# Patient Record
Sex: MALE | Race: WHITE | Employment: OTHER | URBAN - METROPOLITAN AREA
[De-identification: names, ages, dates, MRNs, and addresses within clinical notes are randomized per-mention and may not be internally consistent; named-entity substitution may affect disease eponyms.]

---

## 2024-04-28 ENCOUNTER — HOSPITAL ENCOUNTER (INPATIENT)
Facility: HOSPITAL | Age: 79
LOS: 3 days | Discharge: HOME | DRG: 682 | End: 2024-05-01
Attending: EMERGENCY MEDICINE | Admitting: INTERNAL MEDICINE
Payer: MEDICARE

## 2024-04-28 ENCOUNTER — APPOINTMENT (OUTPATIENT)
Facility: HOSPITAL | Age: 79
DRG: 682 | End: 2024-04-28
Payer: MEDICARE

## 2024-04-28 DIAGNOSIS — N17.9 ACUTE KIDNEY INJURY (HCC): Primary | ICD-10-CM

## 2024-04-28 DIAGNOSIS — G93.40 ENCEPHALOPATHY: ICD-10-CM

## 2024-04-28 DIAGNOSIS — E86.0 DEHYDRATION: ICD-10-CM

## 2024-04-28 LAB
ALBUMIN SERPL-MCNC: 3.3 G/DL (ref 3.5–5)
ALBUMIN/GLOB SERPL: 0.9 (ref 1.1–2.2)
ALP SERPL-CCNC: 69 U/L (ref 45–117)
ALT SERPL-CCNC: 16 U/L (ref 12–78)
ANION GAP SERPL CALC-SCNC: 7 MMOL/L (ref 5–15)
AST SERPL-CCNC: 11 U/L (ref 15–37)
BASOPHILS # BLD: 0.1 K/UL (ref 0–0.1)
BASOPHILS NFR BLD: 0 % (ref 0–1)
BILIRUB SERPL-MCNC: 0.9 MG/DL (ref 0.2–1)
BUN SERPL-MCNC: 41 MG/DL (ref 6–20)
BUN/CREAT SERPL: 19 (ref 12–20)
CALCIUM SERPL-MCNC: 10.2 MG/DL (ref 8.5–10.1)
CHLORIDE SERPL-SCNC: 96 MMOL/L (ref 97–108)
CO2 SERPL-SCNC: 27 MMOL/L (ref 21–32)
CREAT SERPL-MCNC: 2.15 MG/DL (ref 0.7–1.3)
DIFFERENTIAL METHOD BLD: ABNORMAL
EOSINOPHIL # BLD: 0.1 K/UL (ref 0–0.4)
EOSINOPHIL NFR BLD: 0 % (ref 0–7)
ERYTHROCYTE [DISTWIDTH] IN BLOOD BY AUTOMATED COUNT: 13 % (ref 11.5–14.5)
GLOBULIN SER CALC-MCNC: 3.6 G/DL (ref 2–4)
GLUCOSE BLD STRIP.AUTO-MCNC: 126 MG/DL (ref 65–117)
GLUCOSE SERPL-MCNC: 134 MG/DL (ref 65–100)
HCT VFR BLD AUTO: 43.2 % (ref 36.6–50.3)
HGB BLD-MCNC: 14.9 G/DL (ref 12.1–17)
IMM GRANULOCYTES # BLD AUTO: 0.2 K/UL (ref 0–0.04)
IMM GRANULOCYTES NFR BLD AUTO: 1 % (ref 0–0.5)
LACTATE BLD-SCNC: 1.28 MMOL/L (ref 0.4–2)
LIPASE SERPL-CCNC: 40 U/L (ref 13–75)
LYMPHOCYTES # BLD: 2.1 K/UL (ref 0.8–3.5)
LYMPHOCYTES NFR BLD: 10 % (ref 12–49)
MCH RBC QN AUTO: 30.7 PG (ref 26–34)
MCHC RBC AUTO-ENTMCNC: 34.5 G/DL (ref 30–36.5)
MCV RBC AUTO: 88.9 FL (ref 80–99)
MONOCYTES # BLD: 1.8 K/UL (ref 0–1)
MONOCYTES NFR BLD: 9 % (ref 5–13)
NEUTS SEG # BLD: 16.7 K/UL (ref 1.8–8)
NEUTS SEG NFR BLD: 80 % (ref 32–75)
NRBC # BLD: 0 K/UL (ref 0–0.01)
NRBC BLD-RTO: 0 PER 100 WBC
PLATELET # BLD AUTO: 303 K/UL (ref 150–400)
PMV BLD AUTO: 10.4 FL (ref 8.9–12.9)
POTASSIUM SERPL-SCNC: 3.9 MMOL/L (ref 3.5–5.1)
PROT SERPL-MCNC: 6.9 G/DL (ref 6.4–8.2)
RBC # BLD AUTO: 4.86 M/UL (ref 4.1–5.7)
SERVICE CMNT-IMP: ABNORMAL
SODIUM SERPL-SCNC: 130 MMOL/L (ref 136–145)
TROPONIN I SERPL HS-MCNC: 20 NG/L (ref 0–76)
WBC # BLD AUTO: 20.9 K/UL (ref 4.1–11.1)

## 2024-04-28 PROCEDURE — 84484 ASSAY OF TROPONIN QUANT: CPT

## 2024-04-28 PROCEDURE — 83605 ASSAY OF LACTIC ACID: CPT

## 2024-04-28 PROCEDURE — 2580000003 HC RX 258: Performed by: EMERGENCY MEDICINE

## 2024-04-28 PROCEDURE — 6370000000 HC RX 637 (ALT 250 FOR IP): Performed by: INTERNAL MEDICINE

## 2024-04-28 PROCEDURE — 82962 GLUCOSE BLOOD TEST: CPT

## 2024-04-28 PROCEDURE — 1100000000 HC RM PRIVATE

## 2024-04-28 PROCEDURE — 6360000004 HC RX CONTRAST MEDICATION: Performed by: EMERGENCY MEDICINE

## 2024-04-28 PROCEDURE — 99285 EMERGENCY DEPT VISIT HI MDM: CPT

## 2024-04-28 PROCEDURE — 70450 CT HEAD/BRAIN W/O DYE: CPT

## 2024-04-28 PROCEDURE — 36415 COLL VENOUS BLD VENIPUNCTURE: CPT

## 2024-04-28 PROCEDURE — 87040 BLOOD CULTURE FOR BACTERIA: CPT

## 2024-04-28 PROCEDURE — 2580000003 HC RX 258: Performed by: INTERNAL MEDICINE

## 2024-04-28 PROCEDURE — C9113 INJ PANTOPRAZOLE SODIUM, VIA: HCPCS | Performed by: INTERNAL MEDICINE

## 2024-04-28 PROCEDURE — 6360000002 HC RX W HCPCS: Performed by: EMERGENCY MEDICINE

## 2024-04-28 PROCEDURE — 74177 CT ABD & PELVIS W/CONTRAST: CPT

## 2024-04-28 PROCEDURE — 85025 COMPLETE CBC W/AUTO DIFF WBC: CPT

## 2024-04-28 PROCEDURE — 6360000002 HC RX W HCPCS: Performed by: INTERNAL MEDICINE

## 2024-04-28 PROCEDURE — A4216 STERILE WATER/SALINE, 10 ML: HCPCS | Performed by: INTERNAL MEDICINE

## 2024-04-28 PROCEDURE — 6370000000 HC RX 637 (ALT 250 FOR IP): Performed by: NURSE PRACTITIONER

## 2024-04-28 PROCEDURE — 96374 THER/PROPH/DIAG INJ IV PUSH: CPT

## 2024-04-28 PROCEDURE — 80053 COMPREHEN METABOLIC PANEL: CPT

## 2024-04-28 PROCEDURE — 93005 ELECTROCARDIOGRAM TRACING: CPT | Performed by: EMERGENCY MEDICINE

## 2024-04-28 PROCEDURE — 71045 X-RAY EXAM CHEST 1 VIEW: CPT

## 2024-04-28 PROCEDURE — 83690 ASSAY OF LIPASE: CPT

## 2024-04-28 RX ORDER — SODIUM CHLORIDE, SODIUM LACTATE, POTASSIUM CHLORIDE, AND CALCIUM CHLORIDE .6; .31; .03; .02 G/100ML; G/100ML; G/100ML; G/100ML
1000 INJECTION, SOLUTION INTRAVENOUS
Status: COMPLETED | OUTPATIENT
Start: 2024-04-28 | End: 2024-04-28

## 2024-04-28 RX ORDER — SODIUM CHLORIDE 9 MG/ML
INJECTION, SOLUTION INTRAVENOUS PRN
Status: DISCONTINUED | OUTPATIENT
Start: 2024-04-28 | End: 2024-05-01 | Stop reason: HOSPADM

## 2024-04-28 RX ORDER — AMLODIPINE BESYLATE 5 MG/1
5 TABLET ORAL DAILY
Status: DISCONTINUED | OUTPATIENT
Start: 2024-04-29 | End: 2024-05-01 | Stop reason: HOSPADM

## 2024-04-28 RX ORDER — SODIUM CHLORIDE 0.9 % (FLUSH) 0.9 %
5-40 SYRINGE (ML) INJECTION EVERY 12 HOURS SCHEDULED
Status: DISCONTINUED | OUTPATIENT
Start: 2024-04-28 | End: 2024-05-01 | Stop reason: HOSPADM

## 2024-04-28 RX ORDER — SODIUM CHLORIDE 9 MG/ML
INJECTION, SOLUTION INTRAVENOUS CONTINUOUS
Status: ACTIVE | OUTPATIENT
Start: 2024-04-28 | End: 2024-04-30

## 2024-04-28 RX ORDER — TRAZODONE HYDROCHLORIDE 50 MG/1
50 TABLET ORAL NIGHTLY PRN
Status: DISCONTINUED | OUTPATIENT
Start: 2024-04-28 | End: 2024-04-28

## 2024-04-28 RX ORDER — ACETAMINOPHEN 650 MG/1
650 SUPPOSITORY RECTAL EVERY 6 HOURS PRN
Status: DISCONTINUED | OUTPATIENT
Start: 2024-04-28 | End: 2024-05-01 | Stop reason: HOSPADM

## 2024-04-28 RX ORDER — ACETAMINOPHEN 325 MG/1
650 TABLET ORAL EVERY 6 HOURS PRN
Status: DISCONTINUED | OUTPATIENT
Start: 2024-04-28 | End: 2024-05-01 | Stop reason: HOSPADM

## 2024-04-28 RX ORDER — TRAZODONE HYDROCHLORIDE 50 MG/1
50 TABLET ORAL ONCE
Status: COMPLETED | OUTPATIENT
Start: 2024-04-28 | End: 2024-04-28

## 2024-04-28 RX ORDER — SODIUM CHLORIDE 0.9 % (FLUSH) 0.9 %
5-40 SYRINGE (ML) INJECTION PRN
Status: DISCONTINUED | OUTPATIENT
Start: 2024-04-28 | End: 2024-05-01 | Stop reason: HOSPADM

## 2024-04-28 RX ORDER — ONDANSETRON 4 MG/1
4 TABLET, ORALLY DISINTEGRATING ORAL EVERY 8 HOURS PRN
Status: DISCONTINUED | OUTPATIENT
Start: 2024-04-28 | End: 2024-05-01 | Stop reason: HOSPADM

## 2024-04-28 RX ORDER — ONDANSETRON 2 MG/ML
4 INJECTION INTRAMUSCULAR; INTRAVENOUS EVERY 6 HOURS PRN
Status: DISCONTINUED | OUTPATIENT
Start: 2024-04-28 | End: 2024-05-01 | Stop reason: HOSPADM

## 2024-04-28 RX ORDER — TAMSULOSIN HYDROCHLORIDE 0.4 MG/1
0.4 CAPSULE ORAL DAILY
Status: DISCONTINUED | OUTPATIENT
Start: 2024-04-28 | End: 2024-05-01 | Stop reason: HOSPADM

## 2024-04-28 RX ORDER — POLYETHYLENE GLYCOL 3350 17 G/17G
17 POWDER, FOR SOLUTION ORAL DAILY PRN
Status: DISCONTINUED | OUTPATIENT
Start: 2024-04-28 | End: 2024-05-01 | Stop reason: HOSPADM

## 2024-04-28 RX ADMIN — TAMSULOSIN HYDROCHLORIDE 0.4 MG: 0.4 CAPSULE ORAL at 18:45

## 2024-04-28 RX ADMIN — SODIUM CHLORIDE, PRESERVATIVE FREE 5 ML: 5 INJECTION INTRAVENOUS at 21:17

## 2024-04-28 RX ADMIN — PANTOPRAZOLE SODIUM 40 MG: 40 INJECTION, POWDER, LYOPHILIZED, FOR SOLUTION INTRAVENOUS at 18:45

## 2024-04-28 RX ADMIN — IOPAMIDOL 100 ML: 755 INJECTION, SOLUTION INTRAVENOUS at 17:24

## 2024-04-28 RX ADMIN — SODIUM CHLORIDE, POTASSIUM CHLORIDE, SODIUM LACTATE AND CALCIUM CHLORIDE 1000 ML: 600; 310; 30; 20 INJECTION, SOLUTION INTRAVENOUS at 16:24

## 2024-04-28 RX ADMIN — TRAZODONE HYDROCHLORIDE 50 MG: 50 TABLET ORAL at 23:31

## 2024-04-28 RX ADMIN — SODIUM CHLORIDE: 9 INJECTION, SOLUTION INTRAVENOUS at 18:53

## 2024-04-28 RX ADMIN — SODIUM CHLORIDE 1000 MG: 900 INJECTION INTRAVENOUS at 16:11

## 2024-04-28 NOTE — ED PROVIDER NOTES
[facility] 1 CLINICAL OBS  EMERGENCY DEPARTMENT ENCOUNTER       Pt Name: Jonatan Rausch Jr.  MRN: [id number]  Birthdate 1945  Date of evaluation: 4/28/2024  Provider: Kenny Granda MD   PCP: Jeremias Mukherjee MD  Note Started: 4:13 PM EDT 4/28/24     CHIEF COMPLAINT       Chief Complaint   Patient presents with    Altered Mental Status     Pt reports to ed complaining of feeling like food is stuck in his chest, wife reports that he only had two bananas in the last two days, wife reports that he is intermittently confused and not sleeping well, pt is alert/oriented in triage but is slow to respond to questions        HISTORY OF PRESENT ILLNESS: 1 or more elements      History From: patient, History limited by: none     Jonatan Rausch Jr. is a 79 y.o. male with a history of hypertension, CAD and BPH presents emerged department with a chief complaint of \"altered mental status.\"    Patient reports symptoms began approximately 3 days ago with a feeling as though food was \"getting caught\" in his epigastrium.  1 episode of vomiting.  Anorexia decreased appetite.  Occasional cough.  Patient denies headache or neck pain.  Denies chest pain.  Denies shortness of breath.  Denies diarrhea.  No urinary symptoms although does have history of BPH.    Seen in urgent care and started on prednisone, recently completed this.  Patient's wife reports he is \"not himself\" and \"staring off in space.\"    Please See MDM for Additional Details of the HPI/PMH  Nursing Notes were all reviewed and agreed with or any disagreements were addressed in the HPI.     REVIEW OF SYSTEMS        Positives and Pertinent negatives as per HPI.    PAST HISTORY     Past Medical History:  No past medical history on file.    Past Surgical History:  No past surgical history on file.    Family History:  No family history on file.    Social History:       Allergies:  No Known Allergies    CURRENT MEDICATIONS      Current Discharge Medication List        CONTINUE these

## 2024-04-28 NOTE — H&P
age-appropriate. Sternotomy wires are intact. Bilateral glenohumeral osteoarthritis.     Left basilar subsegmental atelectasis.      _______________________________________________________________________    TOTAL TIME:  76 Minutes    Critical Care Provided     Minutes non procedure based    Signed: Connor Sauer MD    Procedures: see electronic medical records for all procedures/Xrays and details which were not copied into this note but were reviewed prior to creation of Plan.

## 2024-04-28 NOTE — ED NOTES
Bedside and Verbal shift change report given to Nina (oncoming nurse) by Kianna (offgoing nurse). Report included the following information ED SBAR, Adult Overview, MAR, Recent Results, and Neuro Assessment.

## 2024-04-29 LAB
AMMONIA PLAS-SCNC: 26 UMOL/L
ANION GAP SERPL CALC-SCNC: 6 MMOL/L (ref 5–15)
APPEARANCE UR: CLEAR
BACTERIA URNS QL MICRO: NEGATIVE /HPF
BASOPHILS # BLD: 0.1 K/UL (ref 0–0.1)
BASOPHILS NFR BLD: 0 % (ref 0–1)
BILIRUB UR QL: NEGATIVE
BUN SERPL-MCNC: 32 MG/DL (ref 6–20)
BUN/CREAT SERPL: 21 (ref 12–20)
CALCIUM SERPL-MCNC: 8.8 MG/DL (ref 8.5–10.1)
CHLORIDE SERPL-SCNC: 101 MMOL/L (ref 97–108)
CO2 SERPL-SCNC: 25 MMOL/L (ref 21–32)
COLOR UR: ABNORMAL
CREAT SERPL-MCNC: 1.52 MG/DL (ref 0.7–1.3)
DIFFERENTIAL METHOD BLD: ABNORMAL
EKG ATRIAL RATE: 86 BPM
EKG DIAGNOSIS: NORMAL
EKG P AXIS: 10 DEGREES
EKG P-R INTERVAL: 164 MS
EKG Q-T INTERVAL: 388 MS
EKG QRS DURATION: 104 MS
EKG QTC CALCULATION (BAZETT): 464 MS
EKG R AXIS: -25 DEGREES
EKG T AXIS: 117 DEGREES
EKG VENTRICULAR RATE: 86 BPM
EOSINOPHIL # BLD: 0.2 K/UL (ref 0–0.4)
EOSINOPHIL NFR BLD: 1 % (ref 0–7)
EPITH CASTS URNS QL MICRO: ABNORMAL /LPF
ERYTHROCYTE [DISTWIDTH] IN BLOOD BY AUTOMATED COUNT: 12.9 % (ref 11.5–14.5)
FOLATE SERPL-MCNC: 5.6 NG/ML (ref 5–21)
GLUCOSE SERPL-MCNC: 111 MG/DL (ref 65–100)
GLUCOSE UR STRIP.AUTO-MCNC: NEGATIVE MG/DL
HCT VFR BLD AUTO: 41.7 % (ref 36.6–50.3)
HGB BLD-MCNC: 14 G/DL (ref 12.1–17)
HGB UR QL STRIP: NEGATIVE
HYALINE CASTS URNS QL MICRO: ABNORMAL /LPF (ref 0–2)
IMM GRANULOCYTES # BLD AUTO: 0.1 K/UL (ref 0–0.04)
IMM GRANULOCYTES NFR BLD AUTO: 1 % (ref 0–0.5)
KETONES UR QL STRIP.AUTO: 15 MG/DL
LEUKOCYTE ESTERASE UR QL STRIP.AUTO: NEGATIVE
LYMPHOCYTES # BLD: 1.7 K/UL (ref 0.8–3.5)
LYMPHOCYTES NFR BLD: 10 % (ref 12–49)
MCH RBC QN AUTO: 30.7 PG (ref 26–34)
MCHC RBC AUTO-ENTMCNC: 33.6 G/DL (ref 30–36.5)
MCV RBC AUTO: 91.4 FL (ref 80–99)
MONOCYTES # BLD: 1.5 K/UL (ref 0–1)
MONOCYTES NFR BLD: 8 % (ref 5–13)
NEUTS SEG # BLD: 13.9 K/UL (ref 1.8–8)
NEUTS SEG NFR BLD: 80 % (ref 32–75)
NITRITE UR QL STRIP.AUTO: NEGATIVE
NRBC # BLD: 0 K/UL (ref 0–0.01)
NRBC BLD-RTO: 0 PER 100 WBC
PH UR STRIP: 6.5 (ref 5–8)
PLATELET # BLD AUTO: 216 K/UL (ref 150–400)
PMV BLD AUTO: 10.7 FL (ref 8.9–12.9)
POTASSIUM SERPL-SCNC: 3.8 MMOL/L (ref 3.5–5.1)
PROT UR STRIP-MCNC: NEGATIVE MG/DL
RBC # BLD AUTO: 4.56 M/UL (ref 4.1–5.7)
RBC #/AREA URNS HPF: ABNORMAL /HPF (ref 0–5)
SODIUM SERPL-SCNC: 132 MMOL/L (ref 136–145)
SP GR UR REFRACTOMETRY: 1.02
TSH SERPL DL<=0.05 MIU/L-ACNC: 0.65 UIU/ML (ref 0.36–3.74)
URINE CULTURE IF INDICATED: ABNORMAL
UROBILINOGEN UR QL STRIP.AUTO: 1 EU/DL (ref 0.2–1)
VIT B12 SERPL-MCNC: 656 PG/ML (ref 193–986)
WBC # BLD AUTO: 17.5 K/UL (ref 4.1–11.1)
WBC URNS QL MICRO: ABNORMAL /HPF (ref 0–4)

## 2024-04-29 PROCEDURE — 2580000003 HC RX 258: Performed by: INTERNAL MEDICINE

## 2024-04-29 PROCEDURE — 82140 ASSAY OF AMMONIA: CPT

## 2024-04-29 PROCEDURE — C9113 INJ PANTOPRAZOLE SODIUM, VIA: HCPCS | Performed by: INTERNAL MEDICINE

## 2024-04-29 PROCEDURE — 82746 ASSAY OF FOLIC ACID SERUM: CPT

## 2024-04-29 PROCEDURE — 84443 ASSAY THYROID STIM HORMONE: CPT

## 2024-04-29 PROCEDURE — 6370000000 HC RX 637 (ALT 250 FOR IP)

## 2024-04-29 PROCEDURE — 6370000000 HC RX 637 (ALT 250 FOR IP): Performed by: INTERNAL MEDICINE

## 2024-04-29 PROCEDURE — 36415 COLL VENOUS BLD VENIPUNCTURE: CPT

## 2024-04-29 PROCEDURE — A4216 STERILE WATER/SALINE, 10 ML: HCPCS | Performed by: INTERNAL MEDICINE

## 2024-04-29 PROCEDURE — 95816 EEG AWAKE AND DROWSY: CPT

## 2024-04-29 PROCEDURE — 81001 URINALYSIS AUTO W/SCOPE: CPT

## 2024-04-29 PROCEDURE — 80048 BASIC METABOLIC PNL TOTAL CA: CPT

## 2024-04-29 PROCEDURE — 82607 VITAMIN B-12: CPT

## 2024-04-29 PROCEDURE — 85025 COMPLETE CBC W/AUTO DIFF WBC: CPT

## 2024-04-29 PROCEDURE — 1100000000 HC RM PRIVATE

## 2024-04-29 PROCEDURE — 6360000002 HC RX W HCPCS: Performed by: INTERNAL MEDICINE

## 2024-04-29 RX ORDER — CARVEDILOL 25 MG/1
25 TABLET ORAL 2 TIMES DAILY
COMMUNITY

## 2024-04-29 RX ORDER — TRAZODONE HYDROCHLORIDE 100 MG/1
100 TABLET ORAL NIGHTLY
Status: ON HOLD | COMMUNITY
End: 2024-05-01 | Stop reason: HOSPADM

## 2024-04-29 RX ORDER — LISINOPRIL 20 MG/1
20 TABLET ORAL DAILY
Status: ON HOLD | COMMUNITY
End: 2024-04-29 | Stop reason: ALTCHOICE

## 2024-04-29 RX ORDER — AMLODIPINE BESYLATE 5 MG/1
5 TABLET ORAL
COMMUNITY

## 2024-04-29 RX ORDER — VALSARTAN 160 MG/1
160 TABLET ORAL DAILY
Status: ON HOLD | COMMUNITY
End: 2024-04-29 | Stop reason: ALTCHOICE

## 2024-04-29 RX ORDER — ACETAMINOPHEN/DIPHENHYDRAMINE 500MG-25MG
1-2 TABLET ORAL NIGHTLY PRN
COMMUNITY

## 2024-04-29 RX ORDER — MELOXICAM 7.5 MG/1
7.5 TABLET ORAL 2 TIMES DAILY
COMMUNITY

## 2024-04-29 RX ORDER — HYDROXYZINE HYDROCHLORIDE 10 MG/1
10 TABLET, FILM COATED ORAL 3 TIMES DAILY PRN
Status: DISCONTINUED | OUTPATIENT
Start: 2024-04-29 | End: 2024-05-01 | Stop reason: HOSPADM

## 2024-04-29 RX ORDER — HYDROCHLOROTHIAZIDE 25 MG/1
25 TABLET ORAL DAILY
Status: ON HOLD | COMMUNITY
End: 2024-04-29 | Stop reason: ALTCHOICE

## 2024-04-29 RX ORDER — OXYBUTYNIN CHLORIDE 5 MG/1
5 TABLET, EXTENDED RELEASE ORAL EVERY EVENING
COMMUNITY

## 2024-04-29 RX ORDER — FINASTERIDE 5 MG/1
5 TABLET, FILM COATED ORAL DAILY
COMMUNITY

## 2024-04-29 RX ORDER — QUETIAPINE FUMARATE 25 MG/1
25 TABLET, FILM COATED ORAL NIGHTLY
Status: DISCONTINUED | OUTPATIENT
Start: 2024-04-29 | End: 2024-05-01 | Stop reason: HOSPADM

## 2024-04-29 RX ORDER — SIMVASTATIN 20 MG
20 TABLET ORAL NIGHTLY
COMMUNITY

## 2024-04-29 RX ORDER — ASPIRIN 81 MG/1
81 TABLET ORAL
COMMUNITY

## 2024-04-29 RX ORDER — TRAZODONE HYDROCHLORIDE 50 MG/1
50-100 TABLET ORAL
Status: ON HOLD | COMMUNITY
End: 2024-04-29 | Stop reason: ALTCHOICE

## 2024-04-29 RX ORDER — CLONIDINE HYDROCHLORIDE 0.1 MG/1
0.1 TABLET ORAL 2 TIMES DAILY
Status: ON HOLD | COMMUNITY
End: 2024-04-29 | Stop reason: ALTCHOICE

## 2024-04-29 RX ADMIN — SODIUM CHLORIDE, PRESERVATIVE FREE 10 ML: 5 INJECTION INTRAVENOUS at 08:45

## 2024-04-29 RX ADMIN — SODIUM CHLORIDE: 9 INJECTION, SOLUTION INTRAVENOUS at 05:28

## 2024-04-29 RX ADMIN — HYDROXYZINE HYDROCHLORIDE 10 MG: 10 TABLET ORAL at 16:54

## 2024-04-29 RX ADMIN — TAMSULOSIN HYDROCHLORIDE 0.4 MG: 0.4 CAPSULE ORAL at 08:44

## 2024-04-29 RX ADMIN — AMLODIPINE BESYLATE 5 MG: 5 TABLET ORAL at 08:44

## 2024-04-29 RX ADMIN — PANTOPRAZOLE SODIUM 40 MG: 40 INJECTION, POWDER, LYOPHILIZED, FOR SOLUTION INTRAVENOUS at 08:45

## 2024-04-29 RX ADMIN — HYDROXYZINE HYDROCHLORIDE 10 MG: 10 TABLET ORAL at 20:43

## 2024-04-29 RX ADMIN — ACETAMINOPHEN 650 MG: 325 TABLET ORAL at 20:43

## 2024-04-29 RX ADMIN — QUETIAPINE FUMARATE 25 MG: 25 TABLET ORAL at 20:43

## 2024-04-29 NOTE — ED NOTES
TRANSFER - OUT REPORT:    Verbal report given to Martine on Jonatan Rausch Jr.  being transferred to [facility] for routine progression of patient care       Report consisted of patient's Situation, Background, Assessment and   Recommendations(SBAR).     Information from the following report(s) Nurse Handoff Report, Index, ED Encounter Summary, ED SBAR, MAR, and Cardiac Rhythm NSR  was reviewed with the receiving nurse.    [facility] Fall Assessment:    Presents to emergency department  because of falls (Syncope, seizure, or loss of consciousness): No  Age > 70: Yes  Altered Mental Status, Intoxication with alcohol or substance confusion (Disorientation, impaired judgment, poor safety awaremess, or inability to follow instructions): Yes  Impaired Mobility: Ambulates or transfers with assistive devices or assistance; Unable to ambulate or transer.: No  Nursing Judgement: Yes          Lines:   Peripheral IV 04/28/24 Right Forearm (Active)       Peripheral IV 04/28/24 Left Antecubital (Active)        Opportunity for questions and clarification was provided.      Patient transported with:  Tech

## 2024-04-29 NOTE — PROCEDURES
contains mixed frequencies in the delta, theta, alpha range. The posterior dominant rhythm reaches 8 to 9 hz and is seen symmetrically in both hemispheres.  Amplitudes are low and voltage.  However they are symmetric.  The anterior posterior gradient is well-organized.  There is variability, continuity, reactivity present.    Throughout the recording, there were no clear areas of focal slowing nor spike or spike-and-wave discharges seen.     Hyperventilation was not performed. Photic stimulation produced no response in the posterior head regions.     During drowsiness, there is attenuation of the posterior dominant rhythm, roving horizontal eye movements and slower frequencies in the theta range.     During the recording, the patient did not achieve stage II sleep.    There are no epileptiform discharges, electrographic seizures or clinical events of concern.     Single channel EKG shows regular rate and rhythm.    Clinical Interpretation:  This EEG, performed during wakefulness and drowsiness/sleep, is normal.  There was no clear focal abnormalities or epileptiform activity. The absence of epileptiform activity neither excludes nor supports the diagnosis of epilepsy. If further suspicion persists, would recommend obtaining a continuous EEG study. Clinical correlation recommended.     Brooklynn Calhoun D.O.

## 2024-04-30 ENCOUNTER — APPOINTMENT (OUTPATIENT)
Facility: HOSPITAL | Age: 79
DRG: 682 | End: 2024-04-30
Payer: MEDICARE

## 2024-04-30 LAB
ANION GAP SERPL CALC-SCNC: 6 MMOL/L (ref 5–15)
BASOPHILS # BLD: 0 K/UL (ref 0–0.1)
BASOPHILS NFR BLD: 0 % (ref 0–1)
BUN SERPL-MCNC: 21 MG/DL (ref 6–20)
BUN/CREAT SERPL: 18 (ref 12–20)
CALCIUM SERPL-MCNC: 8.9 MG/DL (ref 8.5–10.1)
CHLORIDE SERPL-SCNC: 105 MMOL/L (ref 97–108)
CO2 SERPL-SCNC: 24 MMOL/L (ref 21–32)
CREAT SERPL-MCNC: 1.14 MG/DL (ref 0.7–1.3)
DIFFERENTIAL METHOD BLD: ABNORMAL
EOSINOPHIL # BLD: 0.2 K/UL (ref 0–0.4)
EOSINOPHIL NFR BLD: 2 % (ref 0–7)
ERYTHROCYTE [DISTWIDTH] IN BLOOD BY AUTOMATED COUNT: 12.8 % (ref 11.5–14.5)
GLUCOSE SERPL-MCNC: 96 MG/DL (ref 65–100)
HCT VFR BLD AUTO: 40.9 % (ref 36.6–50.3)
HGB BLD-MCNC: 13.7 G/DL (ref 12.1–17)
IMM GRANULOCYTES # BLD AUTO: 0.1 K/UL (ref 0–0.04)
IMM GRANULOCYTES NFR BLD AUTO: 1 % (ref 0–0.5)
LYMPHOCYTES # BLD: 1 K/UL (ref 0.8–3.5)
LYMPHOCYTES NFR BLD: 10 % (ref 12–49)
MAGNESIUM SERPL-MCNC: 1.6 MG/DL (ref 1.6–2.4)
MCH RBC QN AUTO: 30.3 PG (ref 26–34)
MCHC RBC AUTO-ENTMCNC: 33.5 G/DL (ref 30–36.5)
MCV RBC AUTO: 90.5 FL (ref 80–99)
MONOCYTES # BLD: 0.9 K/UL (ref 0–1)
MONOCYTES NFR BLD: 9 % (ref 5–13)
NEUTS SEG # BLD: 8.2 K/UL (ref 1.8–8)
NEUTS SEG NFR BLD: 78 % (ref 32–75)
NRBC # BLD: 0 K/UL (ref 0–0.01)
NRBC BLD-RTO: 0 PER 100 WBC
PLATELET # BLD AUTO: 214 K/UL (ref 150–400)
PMV BLD AUTO: 10.5 FL (ref 8.9–12.9)
POTASSIUM SERPL-SCNC: 3.5 MMOL/L (ref 3.5–5.1)
PROCALCITONIN SERPL-MCNC: 0.05 NG/ML
RBC # BLD AUTO: 4.52 M/UL (ref 4.1–5.7)
SODIUM SERPL-SCNC: 135 MMOL/L (ref 136–145)
WBC # BLD AUTO: 10.5 K/UL (ref 4.1–11.1)

## 2024-04-30 PROCEDURE — 36415 COLL VENOUS BLD VENIPUNCTURE: CPT

## 2024-04-30 PROCEDURE — A4216 STERILE WATER/SALINE, 10 ML: HCPCS | Performed by: INTERNAL MEDICINE

## 2024-04-30 PROCEDURE — 6360000002 HC RX W HCPCS: Performed by: INTERNAL MEDICINE

## 2024-04-30 PROCEDURE — 6370000000 HC RX 637 (ALT 250 FOR IP): Performed by: INTERNAL MEDICINE

## 2024-04-30 PROCEDURE — 6370000000 HC RX 637 (ALT 250 FOR IP)

## 2024-04-30 PROCEDURE — 70551 MRI BRAIN STEM W/O DYE: CPT

## 2024-04-30 PROCEDURE — 97535 SELF CARE MNGMENT TRAINING: CPT

## 2024-04-30 PROCEDURE — 74220 X-RAY XM ESOPHAGUS 1CNTRST: CPT

## 2024-04-30 PROCEDURE — 85025 COMPLETE CBC W/AUTO DIFF WBC: CPT

## 2024-04-30 PROCEDURE — 84145 PROCALCITONIN (PCT): CPT

## 2024-04-30 PROCEDURE — 80048 BASIC METABOLIC PNL TOTAL CA: CPT

## 2024-04-30 PROCEDURE — C9113 INJ PANTOPRAZOLE SODIUM, VIA: HCPCS | Performed by: INTERNAL MEDICINE

## 2024-04-30 PROCEDURE — 83735 ASSAY OF MAGNESIUM: CPT

## 2024-04-30 PROCEDURE — 97165 OT EVAL LOW COMPLEX 30 MIN: CPT

## 2024-04-30 PROCEDURE — 97161 PT EVAL LOW COMPLEX 20 MIN: CPT | Performed by: PHYSICAL THERAPIST

## 2024-04-30 PROCEDURE — 2580000003 HC RX 258: Performed by: INTERNAL MEDICINE

## 2024-04-30 PROCEDURE — 1100000000 HC RM PRIVATE

## 2024-04-30 RX ADMIN — AMLODIPINE BESYLATE 5 MG: 5 TABLET ORAL at 08:35

## 2024-04-30 RX ADMIN — SODIUM CHLORIDE, PRESERVATIVE FREE 5 ML: 5 INJECTION INTRAVENOUS at 20:32

## 2024-04-30 RX ADMIN — PANTOPRAZOLE SODIUM 40 MG: 40 INJECTION, POWDER, LYOPHILIZED, FOR SOLUTION INTRAVENOUS at 08:35

## 2024-04-30 RX ADMIN — ACETAMINOPHEN 650 MG: 325 TABLET ORAL at 20:32

## 2024-04-30 RX ADMIN — QUETIAPINE FUMARATE 25 MG: 25 TABLET ORAL at 20:32

## 2024-04-30 RX ADMIN — HYDROXYZINE HYDROCHLORIDE 10 MG: 10 TABLET ORAL at 20:32

## 2024-04-30 RX ADMIN — TAMSULOSIN HYDROCHLORIDE 0.4 MG: 0.4 CAPSULE ORAL at 08:35

## 2024-04-30 RX ADMIN — SODIUM CHLORIDE, PRESERVATIVE FREE 10 ML: 5 INJECTION INTRAVENOUS at 08:34

## 2024-04-30 NOTE — CONSULTS
Date of Consultation:  April 30, 2024    Referring Physician: MD True     Reason for Consultation:  confusion, staring spells last 3-4 days     Chief Complaint   Patient presents with    Altered Mental Status     Pt reports to ed complaining of feeling like food is stuck in his chest, wife reports that he only had two bananas in the last two days, wife reports that he is intermittently confused and not sleeping well, pt is alert/oriented in triage but is slow to respond to questions       History of Present Illness:   Jonatan Rausch Jr. is a 79 y.o. male with hypertension, hyperlipidemia, history of coronary artery disease status post CABG on Plavix who presents with PABLO due to dehydration, dysphagia, leukocytosis secondary to prednisone use who presents with acute metabolic encephalopathy with confusion and episodes of staring in space.      Patient initially presented with complaint of food getting stuck in his upper abdomen.  This has been going on for about a week.  He was noted to have a ringworm infection and was placed on prednisone.  Wife reports that he is having episodes of confusion and staring episodes where he stares into space for the last 3 to 4 days.  Denies any shaking episode.  She states that she is able to call to have the neck and break his stare.  No drooling. Patient's wife also mentions short term memory issues, forgetful about the date. Asking same questions again.  This has been going on for a year.  He is also gotten lost while driving, still continues to drive.  He is taking his medications on his own, however his wife is unsure if he is taking them appropriately.  He is able to dress himself, feed himself, bathe himself.  He can do laundry.  He does have difficulty recently with finances, paying bills repetitively.    Patient denies any history of seizures or family.      No past medical history on file.     No past surgical history on file.     No family history on file.     Social 
04/29/24  5:30 AM   Result Value Ref Range    Sodium 132 (L) 136 - 145 mmol/L    Potassium 3.8 3.5 - 5.1 mmol/L    Chloride 101 97 - 108 mmol/L    CO2 25 21 - 32 mmol/L    Anion Gap 6 5 - 15 mmol/L    Glucose 111 (H) 65 - 100 mg/dL    BUN 32 (H) 6 - 20 MG/DL    Creatinine 1.52 (H) 0.70 - 1.30 MG/DL    Bun/Cre Ratio 21 (H) 12 - 20      Est, Glom Filt Rate 46 (L) >60 ml/min/1.73m2    Calcium 8.8 8.5 - 10.1 MG/DL   CBC with Auto Differential    Collection Time: 04/29/24  5:30 AM   Result Value Ref Range    WBC 17.5 (H) 4.1 - 11.1 K/uL    RBC 4.56 4.10 - 5.70 M/uL    Hemoglobin 14.0 12.1 - 17.0 g/dL    Hematocrit 41.7 36.6 - 50.3 %    MCV 91.4 80.0 - 99.0 FL    MCH 30.7 26.0 - 34.0 PG    MCHC 33.6 30.0 - 36.5 g/dL    RDW 12.9 11.5 - 14.5 %    Platelets 216 150 - 400 K/uL    MPV 10.7 8.9 - 12.9 FL    Nucleated RBCs 0.0 0  WBC    nRBC 0.00 0.00 - 0.01 K/uL    Neutrophils % 80 (H) 32 - 75 %    Lymphocytes % 10 (L) 12 - 49 %    Monocytes % 8 5 - 13 %    Eosinophils % 1 0 - 7 %    Basophils % 0 0 - 1 %    Immature Granulocytes % 1 (H) 0.0 - 0.5 %    Neutrophils Absolute 13.9 (H) 1.8 - 8.0 K/UL    Lymphocytes Absolute 1.7 0.8 - 3.5 K/UL    Monocytes Absolute 1.5 (H) 0.0 - 1.0 K/UL    Eosinophils Absolute 0.2 0.0 - 0.4 K/UL    Basophils Absolute 0.1 0.0 - 0.1 K/UL    Immature Granulocytes Absolute 0.1 (H) 0.00 - 0.04 K/UL    Differential Type AUTOMATED     Vitamin B12    Collection Time: 04/29/24  5:30 AM   Result Value Ref Range    Vitamin B-12 656 193 - 986 pg/mL   Ammonia    Collection Time: 04/29/24  5:30 AM   Result Value Ref Range    Ammonia 26 <32 UMOL/L   Folate    Collection Time: 04/29/24  5:30 AM   Result Value Ref Range    Folate 5.6 5.0 - 21.0 ng/mL   TSH    Collection Time: 04/29/24  5:30 AM   Result Value Ref Range    TSH, 3RD GENERATION 0.65 0.36 - 3.74 uIU/mL       IMAGING RESULTS:  I have personally reviewed the imaging reports      Total time spent with patient: 15 minutes

## 2024-04-30 NOTE — CARE COORDINATION
Care Management Initial Assessment       RUR: 9%  Readmission? No  1st IM letter given? Yes - 4/28/24  1st  letter given: No      CM introduce self, explain role and confirmed demographics with pt.    Pt lives with his spouse in an on story home with 5 steps to enter.    No hx of home health, SNF or inpatient rehab.    Pt uses [facility] at [place].    At the time of d/c pt's spouse will transport.    CM will follow and assist with d/c planning.       04/30/24 6469   Service Assessment   Patient Orientation Alert and Oriented   Cognition Alert   History Provided By Patient   Primary Caregiver Self   Support Systems Spouse/Significant Other   Patient's Healthcare Decision Maker is: Legal Next of Kin  (Pt's spouse Avril Rausch)   Prior Functional Level Independent in ADLs/IADLs   Current Functional Level Independent in ADLs/IADLs   Can patient return to prior living arrangement Yes   Family able to assist with home care needs: Yes   Would you like for me to discuss the discharge plan with any other family members/significant others, and if so, who? Yes  (Pt's spouse-Avril Rausch)   Financial Resources Medicare;Other (Comment)   Community Resources None   Social/Functional History   Lives With Spouse   Type of Home House   Home Equipment Cane;Grab bars  (C Pap)   Active  Yes   Discharge Planning   Type of Residence House   Current Services Prior To Admission C-pap   Patient expects to be discharged to: House     Advance Care Planning     General Advance Care Planning (ACP) Conversation    Date of Conversation: 4/30/2024  Conducted with: Patient with Decision Making Capacity  Other persons present: None    Healthcare Decision Maker: No healthcare decision makers have been documented.  Click here to complete HealthCare Decision Makers including selection of the Healthcare Decision Maker Relationship (ie \"Primary\")       Content/Action Overview:  DECLINED ACP Conversation - will revisit

## 2024-05-01 ENCOUNTER — TELEPHONE (OUTPATIENT)
Facility: HOSPITAL | Age: 79
End: 2024-05-01

## 2024-05-01 VITALS
OXYGEN SATURATION: 96 % | TEMPERATURE: 98.4 F | SYSTOLIC BLOOD PRESSURE: 117 MMHG | HEART RATE: 87 BPM | RESPIRATION RATE: 16 BRPM | DIASTOLIC BLOOD PRESSURE: 93 MMHG

## 2024-05-01 LAB
ANION GAP SERPL CALC-SCNC: 5 MMOL/L (ref 5–15)
BASOPHILS # BLD: 0.1 K/UL (ref 0–0.1)
BASOPHILS NFR BLD: 1 % (ref 0–1)
BUN SERPL-MCNC: 16 MG/DL (ref 6–20)
BUN/CREAT SERPL: 13 (ref 12–20)
CALCIUM SERPL-MCNC: 8.3 MG/DL (ref 8.5–10.1)
CHLORIDE SERPL-SCNC: 107 MMOL/L (ref 97–108)
CO2 SERPL-SCNC: 23 MMOL/L (ref 21–32)
CREAT SERPL-MCNC: 1.19 MG/DL (ref 0.7–1.3)
DIFFERENTIAL METHOD BLD: ABNORMAL
EOSINOPHIL # BLD: 0.2 K/UL (ref 0–0.4)
EOSINOPHIL NFR BLD: 2 % (ref 0–7)
ERYTHROCYTE [DISTWIDTH] IN BLOOD BY AUTOMATED COUNT: 12.6 % (ref 11.5–14.5)
GLUCOSE SERPL-MCNC: 115 MG/DL (ref 65–100)
HCT VFR BLD AUTO: 44.2 % (ref 36.6–50.3)
HGB BLD-MCNC: 14.7 G/DL (ref 12.1–17)
IMM GRANULOCYTES # BLD AUTO: 0.1 K/UL (ref 0–0.04)
IMM GRANULOCYTES NFR BLD AUTO: 1 % (ref 0–0.5)
LYMPHOCYTES # BLD: 1 K/UL (ref 0.8–3.5)
LYMPHOCYTES NFR BLD: 11 % (ref 12–49)
MCH RBC QN AUTO: 30.4 PG (ref 26–34)
MCHC RBC AUTO-ENTMCNC: 33.3 G/DL (ref 30–36.5)
MCV RBC AUTO: 91.5 FL (ref 80–99)
MONOCYTES # BLD: 1 K/UL (ref 0–1)
MONOCYTES NFR BLD: 10 % (ref 5–13)
NEUTS SEG # BLD: 7.4 K/UL (ref 1.8–8)
NEUTS SEG NFR BLD: 75 % (ref 32–75)
NRBC # BLD: 0 K/UL (ref 0–0.01)
NRBC BLD-RTO: 0 PER 100 WBC
PLATELET # BLD AUTO: 226 K/UL (ref 150–400)
PMV BLD AUTO: 10.5 FL (ref 8.9–12.9)
POTASSIUM SERPL-SCNC: 3.7 MMOL/L (ref 3.5–5.1)
RBC # BLD AUTO: 4.83 M/UL (ref 4.1–5.7)
SODIUM SERPL-SCNC: 135 MMOL/L (ref 136–145)
WBC # BLD AUTO: 9.6 K/UL (ref 4.1–11.1)

## 2024-05-01 PROCEDURE — 97535 SELF CARE MNGMENT TRAINING: CPT

## 2024-05-01 PROCEDURE — 6370000000 HC RX 637 (ALT 250 FOR IP): Performed by: INTERNAL MEDICINE

## 2024-05-01 PROCEDURE — 99233 SBSQ HOSP IP/OBS HIGH 50: CPT

## 2024-05-01 PROCEDURE — 97116 GAIT TRAINING THERAPY: CPT

## 2024-05-01 PROCEDURE — 2580000003 HC RX 258: Performed by: INTERNAL MEDICINE

## 2024-05-01 PROCEDURE — A4216 STERILE WATER/SALINE, 10 ML: HCPCS | Performed by: INTERNAL MEDICINE

## 2024-05-01 PROCEDURE — 6360000002 HC RX W HCPCS: Performed by: INTERNAL MEDICINE

## 2024-05-01 PROCEDURE — 36415 COLL VENOUS BLD VENIPUNCTURE: CPT

## 2024-05-01 PROCEDURE — 80048 BASIC METABOLIC PNL TOTAL CA: CPT

## 2024-05-01 PROCEDURE — 85025 COMPLETE CBC W/AUTO DIFF WBC: CPT

## 2024-05-01 PROCEDURE — C9113 INJ PANTOPRAZOLE SODIUM, VIA: HCPCS | Performed by: INTERNAL MEDICINE

## 2024-05-01 RX ORDER — QUETIAPINE FUMARATE 25 MG/1
25 TABLET, FILM COATED ORAL NIGHTLY
Qty: 30 TABLET | Refills: 3 | Status: SHIPPED | OUTPATIENT
Start: 2024-05-01

## 2024-05-01 RX ORDER — TAMSULOSIN HYDROCHLORIDE 0.4 MG/1
0.4 CAPSULE ORAL DAILY
Qty: 30 CAPSULE | Refills: 3 | Status: SHIPPED | OUTPATIENT
Start: 2024-05-02

## 2024-05-01 RX ORDER — DONEPEZIL HYDROCHLORIDE 5 MG/1
5 TABLET, FILM COATED ORAL NIGHTLY
Qty: 30 TABLET | Refills: 3 | Status: SHIPPED | OUTPATIENT
Start: 2024-05-01

## 2024-05-01 RX ADMIN — SODIUM CHLORIDE, PRESERVATIVE FREE 10 ML: 5 INJECTION INTRAVENOUS at 11:21

## 2024-05-01 RX ADMIN — TAMSULOSIN HYDROCHLORIDE 0.4 MG: 0.4 CAPSULE ORAL at 11:18

## 2024-05-01 RX ADMIN — SODIUM CHLORIDE, PRESERVATIVE FREE 10 ML: 5 INJECTION INTRAVENOUS at 11:20

## 2024-05-01 RX ADMIN — PANTOPRAZOLE SODIUM 40 MG: 40 INJECTION, POWDER, LYOPHILIZED, FOR SOLUTION INTRAVENOUS at 11:19

## 2024-05-01 RX ADMIN — AMLODIPINE BESYLATE 5 MG: 5 TABLET ORAL at 11:19

## 2024-05-01 ASSESSMENT — PAIN SCALES - GENERAL: PAINLEVEL_OUTOF10: 0

## 2024-05-01 NOTE — CARE COORDINATION
Pt is clear from CM standpoint for d/c.    Pt's spouse will transport.    Transition of Care Plan:    RUR: 10%  Prior Level of Functioning: Independent   Disposition: Home with spouse  no services   If SNF or IPR: Date FOC offered:   Date FOC received:   Accepting facility:   Date authorization started with reference number:   Date authorization received and expires:   Follow up appointments: PCP  DME needed: No DME reported   Transportation at discharge: Pt's spouse  IM/IMM Medicare/ letter given: 5/1/24  Is patient a [facility] and connected with VA? No   If yes, was  transfer form completed and VA notified? No  Caregiver Contact: Pt's spouse   Discharge Caregiver contacted prior to discharge? Pt was contacted   Care Conference needed? No  Barriers to discharge: None         05/01/24 1341   Services At/After Discharge   Transition of Care Consult (CM Consult) N/A   Services At/After Discharge None    Resource Information Provided? No   Mode of Transport at Discharge Self   Confirm Follow Up Transport Self   Condition of Participation: Discharge Planning   The Plan for Transition of Care is related to the following treatment goals: Goal is to return home with spouse and follow up appointments   The Patient and/or Patient Representative was provided with a Choice of Provider? Patient   The Patient and/Or Patient Representative agree with the Discharge Plan? Yes   Freedom of Choice list was provided with basic dialogue that supports the patient's individualized plan of care/goals, treatment preferences, and shares the quality data associated with the providers?  Yes     Caro Carmona

## 2024-05-01 NOTE — DISCHARGE SUMMARY
week(s)  Post-hospitalization follow up visit    Brooklynn Calhoun DO  [address]  [address]  [facility] [id number] [phone]    Follow up in 2 week(s)  Formal neurocognitive testing in office  Patient advised not to drive for personal or professional purposes until seen by neurology and cleared (or not cleared) per their judgement    Marco Leung MD  [address]  [address]  [facility] [address] [phone]    Follow up in 2 week(s)  May see for further evaluation of dysphagia (difficulty swallowing)  Can consider EGD or esophageal dilation on outpatient basis          Total time in minutes spent coordinating this discharge (includes going over instructions, follow-up, prescriptions, and preparing report for sign off to her PCP) :  35 minutes

## 2024-05-01 NOTE — PLAN OF CARE
Problem: Discharge Planning  Goal: Discharge to home or other facility with appropriate resources  Outcome: Progressing     
Problem: Physical Therapy - Adult  Goal: By Discharge: Performs mobility at highest level of function for planned discharge setting.  See evaluation for individualized goals.  Description: FUNCTIONAL STATUS PRIOR TO ADMISSION: Patient was independent and active without use of DME.    HOME SUPPORT PRIOR TO ADMISSION: The patient lived with wife but did not require assist. (Per patient)    Physical Therapy Goals  Initiated 4/30/2024  1.  Patient will move from supine to sit and sit to supine  in bed with independence within 7 day(s).    2.  Patient will transfer from bed to chair and chair to bed with independence using the least restrictive device within 7 day(s).  3.  Patient will perform sit to stand with independence within 7 day(s).  4.  Patient will ambulate with supervision/set-up for 200 feet with the least restrictive device within 7 day(s).   5.  Patient will ascend/descend 3 stairs with 1 handrail(s) with minimal assistance/contact guard assist within 7 day(s).    Outcome: Adequate for Discharge     PHYSICAL THERAPY TREATMENT/DISCHARGE    Patient: Jonatan Rausch  (79 y.o. male)  Date: 5/1/2024  Diagnosis: PABLO (acute kidney injury) ([facility]) [N17.9] PABLO (acute kidney injury) ([facility])      Precautions: Fall Risk, General Precautions, Bed Alarm (telesitter)                    ASSESSMENT:  Patient has been followed by skilled PT services and has progressed towards goals. Patient ambulating in lemus with overall supervision to independence. Gait is slow and shuffling but appears close to baseline per patient report and no overt loss of balance. Patient unaware of reason for admission and is eager to discharge home from hospital. He is cooperative and follows all commands and directions appropriately. He demonstrates no Acute PT needs and no PT needs recommended at discharge. Will sign off.      PLAN:  Maximum therapeutic benefit has been met at current level of care and patient will be discharged from physical therapy 
(telesitter; NPO)                Chart, occupational therapy assessment, plan of care, and goals were reviewed.    ASSESSMENT  Patient continues to benefit from skilled OT services and is progressing towards goals. Good participate; S-functional mobility today, S use of buttong hook with good  handle; verbally trained use of zipper pull. Wife present and aware of need for button assist.             PLAN :  Patient continues to benefit from skilled intervention to address the above impairments.  Continue treatment per established plan of care to address goals.    Recommend with staff: up to chair for all meals    Recommend next OT session: [facility]    Recommendation for discharge: (in order for the patient to meet his/her long term goals): Therapy 2x a week in the home    Other factors to consider for discharge: impaired cognition and not safe to be alone    IF patient discharges home will need the following DME: transfer bench       SUBJECTIVE:   Patient stated “I will use this; it works good.” (button hook)    OBJECTIVE DATA SUMMARY:   Cognitive/Behavioral Status:  Orientation  Overall Orientation Status: Impaired  Orientation Level: Oriented to place;Oriented to time;Oriented to person;Disoriented to situation  Cognition  Overall Cognitive Status: Exceptions  Arousal/Alertness: Appropriate responses to stimuli  Following Commands: Follows one step commands with increased time;Follows one step commands with repetition;Follows multistep commands with repitition;Follows multistep commands with increased time  Attention Span: Difficulty dividing attention  Memory: Decreased short term memory;Decreased recall of biographical Information;Decreased recall of precautions;Decreased recall of recent events  Safety Judgement: Decreased awareness of need for safety;Decreased awareness of need for assistance  Problem Solving: Decreased awareness of errors (thinks he is still working which wife reports he is not)  Insights: 
Impaired  Orientation Level: Oriented to place;Oriented to person;Disoriented to time  Cognition  Overall Cognitive Status: Exceptions  Arousal/Alertness: Appropriate responses to stimuli  Following Commands: Follows one step commands consistently  Attention Span: Appears intact  Memory: Decreased short term memory  Safety Judgement: Decreased awareness of need for assistance;Decreased awareness of need for safety  Initiation: Does not require cues  Sequencing: Does not require cues    Skin: bruising noted UE's    Edema: none noted    Hearing:   Hearing  Hearing: Within functional limits    Vision/Perceptual:          Vision  Vision: Within Functional Limits       Strength:    Strength: Generally decreased, functional    Tone & Sensation:   Tone: Normal  Sensation: Intact    Coordination:  Coordination: Within functional limits    Range Of Motion:  AROM: Within functional limits  PROM: Within functional limits    Functional Mobility:  Bed Mobility:        Transfers:     Transfer Training  Transfer Training: Yes  Sit to Stand: Stand-by assistance  Stand to Sit: Stand-by assistance  Balance:               Balance  Sitting: Intact  Standing: Impaired  Standing - Static: Constant support;Good  Standing - Dynamic: Fair  Ambulation/Gait Training:                  Distance: 100'     Gait  Gait Training: Yes  Left Side Weight Bearing: Full  Right Side Weight Bearing: Full  Assistive Device:  (HHA)  Speed/Sujey: Pace decreased (< 100 feet/min)  Gait Abnormalities: Trunk sway increased;Decreased step clearance                                                                                                                                                                                                                                                          [facility] AM-PAC®      Basic Mobility Inpatient Short Form (6-Clicks) Version 2  How much HELP from another person do you currently need... (If the patient hasn't 
Decreased awareness of deficits  Initiation: Requires cues for some  Sequencing: Requires cues for some  Cognition Comment: recommend cog screen    Skin: intact    Edema: + B ankles    Hearing:   Hearing  Hearing: Within functional limits    Vision/Perceptual:          Vision  Vision: Within Functional Limits (reading glasses)  Perception  Overall Perceptual Status:  (Draw A Clock test i,paired; see paper chart)    Range of Motion:   AROM: Generally decreased, functional         Strength:  Strength: Generally decreased, functional      Coordination:  Coordination: Generally decreased, functional     Coordination: Within functional limits      Tone & Sensation:   Tone: Normal  Sensation: Impaired (L hand fingertips numb)          Functional Mobility and Transfers for ADLs:    Bed Mobility:     Bed Mobility Training  Bed Mobility Training: Yes  Rolling: Stand-by assistance;Supervision  Supine to Sit: Stand-by assistance;Supervision;Adaptive equipment;Additional time  Sit to Supine: Stand-by assistance;Supervision;Additional time;Adaptive equipment  Scooting: Supervision;Additional time;Adaptive equipment;Stand-by assistance    Transfers:      Transfer Training  Transfer Training: Yes  Overall Level of Assistance: Supervision;Contact-guard assistance  Sit to Stand: Supervision;Contact-guard assistance  Stand to Sit: Supervision;Contact-guard assistance                     Balance:   Standing: Impaired  Balance  Sitting: Intact  Standing: Impaired  Standing - Static: Constant support;Good  Standing - Dynamic: Constant support;Fair      ADL Assessment:          Feeding: Setup;Stand by assistance  Feeding Skilled Clinical Factors: inferred; needs set up due to numbness L fingertips; \"coordination is harder\" Currently NPO; expect after set up he will do well    Grooming: Setup;Stand by assistance       UE Bathing: Supervision            LE Bathing: Supervision;Contact guard assistance       UE Dressing: Minimal

## 2024-05-01 NOTE — PROGRESS NOTES
Hospitalist Progress Note    NAME:   Jonatan Rausch Jr.   : 1945   MRN: [id number]     Date/Time: 2024 6:58 PM  Patient PCP: Jeremias Mukherjee MD    Estimated discharge date: ?  Barriers: GI, neuro, clinical stability      Assessment / Plan:  Acute kidney injury likely prerenal due to dehydration  -Baseline creatinine is normal at 1 back in   Creatinine 2.15 on admit.  Continue IV fluids with normal saline  Monitor urine output  Hold home valsartan due to acute kidney injury  Cr improving to 1.52 today s/p IV fluids     Dysphagia  -Patient is reporting a sensation of food getting stuck in the upper abdomen.  He has not eaten well in the last 3 to 4 days.  -Cont clear liquid diet, PPI  -CT abdomen no acute bowel pathology, cholelithiasis without gallbladder inflammation  -GI consulted, recommended barium swallow, however radiology notified nursing this should be an outpatient procedure  -Nurse notified GI.   Next step EGD if no barium swallow possible?  Will resume CLD and keep NPO p MN for possible EGD     Leukocytosis  -Most likely due to his prednisone use.  He is not tachycardic.  UA (-)  Blood culture NGTD.  Check procalcitonin level.  He got a dose of Rocephin empirically in the emergency department and will hold off on further antibiotics.     Acute metabolic encephalopathy  Staring in space  -Wife reported that he is getting confused lately and had episodes of staring in space  Has been receiving course of prednisone for shoulder pain x 5 days  -Will check vitamin B12, vitamin D, TSH with reflex  Ammonia normal  -EEG (-)  - Could be related to prednisone but has not improved off prednisone  - Increasing confusion, particularly at night, suspect underlying cognitive decline +/- hospital delirium, aggravated by steroids, dehydration   Neurology consult appreciated     Hypertension  BPH  Dyslipidemia  History of coronary artery s/p CABG  GERD  -Continue PTA Norvasc.  Holding 
      Hospitalist Progress Note    NAME:   Jonatan Rausch Jr.   : 1945   MRN: [id number]     Date/Time: 2024 3:08 PM  Patient PCP: Jeremias Mukherjee MD    Estimated discharge date: +  Barriers: neuro clearance, MRI, clinical stability, tolerating diet      Assessment / Plan:  Dysphagia  -Patient is reporting a sensation of food getting stuck in the upper abdomen.  He has not eaten well in the last 3 to 4 days.  -Cont clear liquid diet, PPI  -CT abdomen no acute bowel pathology, cholelithiasis without gallbladder inflammation  -GI consult appreciated  - Barium swallow (-)  - GI with no plans for further inpatient endoscopic eval; could consider esophageal dilation on outpatient basis if dysphagia persists  - GI signed off   -Trial regular diet today, if tolerates suspect pt could DC home soon    Acute metabolic encephalopathy  Staring in space  Sundowning behaviors; concern for cognitive decline and dementia  -Wife reported that he is getting confused lately and had episodes of staring in space  Has been receiving course of prednisone for ringworm x 5 days PTA  Normal vitamin B12, vitamin D, TSH  Ammonia normal  -EEG (-)  - Could be related to prednisone but has not improved off prednisone  - Increasing confusion, particularly at night, suspect underlying cognitive decline +/- hospital delirium, aggravated by steroids, dehydration   Neurology consult appreciated  Plan for MRI brain, eventual outpt formal neuropsych testing  Tele sitter at bedside for safety     Leukocytosis--resolved, WBC 10 today  -Most likely due to his prednisone use.  He is not tachycardic.  UA (-)  Blood culture NGTD.  He got a dose of Rocephin empirically in the emergency department and will hold off on further antibiotics.    Acute kidney injury likely prerenal due to dehydration--resolved  -Baseline creatinine is normal at 1 back in   Creatinine 2.15 on admit.  Continue IV fluids with normal saline  Monitor urine 
  GI PROGRESS NOTE  Mitchel Dowell PA-C  [phone] NP in-hospital cell phone M-F until 4:30  After 5pm or on weekends, please call  for physician on call    NAME:Jonatan Rausch Jr. :1945 MRN:[id number]   ATTG: MD Tiffanie   PCP: Jeremias Mukherjee MD  Date/Time:  2024 9:24 AM   Reason for following: Dysphagia    Assessment:   Dysphagia x1 week  PABLO  Acute Metabolic encephalography  VSS  Cr 1.52  Ammonia 26  LFTs nl  WBC 20.9 POA., HGB nl  CT abd/pel  IMPRESSION:  Cholelithiasis without gallbladder inflammation.  No acute bowel pathology.  Mild constipation.  BS 24 - There is no evidence of esophageal stricture or significant esophageal  dysmotility.    Scope Hx  EGD  - G1 reflux esophagitis and small Hiatal hernia. Gastritis. Nl duodenum.   Plan:   Barium swallow without any evidence of esophageal stricture or dysmotility.   Okay for regular diet today.   No further inpatient endoscopic evaluation. If symptoms persist could consider empiric dilation on outpatient basis.   PPI BID given hx of reflux and not on PPI outpatient. Can continue QD oral outpatient.   Rest of care per primary team.   Nothing further to add from a GI standpoint.  GI signing-off.  Please call with any questions.  Thank you for this consult.     Plan discussed with Dr. Leung  I reviewed the medical record including lab work, imaging and other provider notes. I confirmed the interval history as described above.. I discussed this case in detail with DORCAS Bazan. I formulated an updated  assessment of this patient and guided our treatment plan.  I agree with the above progress note. I agree with the history, exam and assessment and plan as outlined in the note.  I would like to add the followinyo M w/ reported dysphagia that did not impede his ingestions here.  Barium swallow completed this AM at our behest demonstrated no esophageal stricture or dysmotility.   Plan: 1) Advance diet; 2) No EGD indicated in this pt; 3) 
EEG completed  
MRI PENDING    Completion of MRI Screening Sheet    Fax to [phone] when completed    Please call [phone]  When this is done    Thank You  
Nursing contacted Nocturnist/cross cover provider and notified patient requesting a sleep aid, states he takes 50-100mg trazodone at home prn and also tylenol PM 1-2 tabs. No other concerns reported. No acute distress reported. VSS. Ordered trazodone 50mg once, as appears earlier on admit had some documented AMS, will not do the higher dose and will not add tylenol pm tylenol/benadryl to trazodone overnight, would defer to dayshift for further sleep med adjustment to meds per their recs. Will defer further evaluation/management to the day shift primary attending care team. Patient denies any further complaints or concerns. Nursing to notify Hospitalist for further/continued concerns. Will remain available overnight for further concerns if nursing/patient needs.     Non-billable note.       
Occupational Therapy   Chart reviewed; cleared for tx; currently leaving floor for test; will retry later for OT eval. Shanae Gamble OTR/L  
Occupational Therapy   Chart reviewed; eval completed; recommend up to chair 3x/day even though not currently eating; Full note to follow; patient recommended to have HH with 24/7 S, if family able to provide this level of assist. If S 24/7 not available then recommend SNF. Shanae Gamble OTR/L  
Pharmacy Medication Reconciliation     The patient was interviewed regarding current PTA medication list, use and drug allergies. The patient was questioned regarding use of any other inhalers, topical products, over the counter medications, herbal medications, vitamin products or ophthalmic/nasal/otic medication use.     Allergy Update: Patient has no known allergies.    Recommendations/Findings:   The following amendments were made to the patient's active medication list on file at [facility]:   1) Additions: NA    2) Deletions: clonidine, hydrochlorothiazide/lisinopril    3) Changes: trazodone 100 mg QHS, Tylenol PM QHS      Pertinent Findings: NA    Clarified PTA med list with patient, insurance fill history. PTA medication list was corrected to the following:     Prior to Admission Medications   Prescriptions Last Dose Informant   Apoaequorin (PREVAGEN PO) 4/28/2024 Self   Sig: Take 1 tablet by mouth daily   amLODIPine (NORVASC) 5 MG tablet 4/28/2024 Other   Sig: Take 1 tablet by mouth nightly   aspirin 81 MG EC tablet 4/28/2024 Self   Sig: Take 1 tablet by mouth nightly   carvedilol (COREG) 25 MG tablet 4/28/2024 Other   Sig: Take 1 tablet by mouth 2 times daily   diphenhydrAMINE-APAP, sleep, (TYLENOL PM EXTRA STRENGTH)  MG tablet 4/28/2024 Self   Sig: Take 1-2 tablets by mouth nightly as needed for Sleep   finasteride (PROSCAR) 5 MG tablet 4/28/2024 Other   Sig: Take 1 tablet by mouth daily   meloxicam (MOBIC) 7.5 MG tablet 4/28/2024 Other   Sig: Take 1 tablet by mouth in the morning and at bedtime   oxyBUTYnin (DITROPAN-XL) 5 MG extended release tablet 4/28/2024 Other   Sig: Take 1 tablet by mouth every evening   simvastatin (ZOCOR) 20 MG tablet 4/28/2024 Other   Sig: Take 1 tablet by mouth nightly   traZODone (DESYREL) 100 MG tablet Past Month Other   Sig: Take 1 tablet by mouth nightly      Facility-Administered Medications: None      Thank you,  Efren Staton, [facility]   
Physical Therapy  PT consult received; chart reviewed.  Upon arrival to room, patient ambulating to stretcher to go off floor for test.  Will attempt evaluation again later this morning.  
Pt reports after lunch which was soft diet he still felt like things were getting stuck in his esophagus (pointing to his upper chest area).  GI informed and said he can schedule something outpatient for a scope and that sometimes reflux can make you have that symptom.  
 0 - 2 /lpf Final    Procalcitonin 04/30/2024 0.05  ng/mL Final    Comment:    Suspected Sepsis:  <0.50 ng/mL     Low likelihood of sepsis.  0.50-2.00 ng/mL    Increased likelihood of sepsis. Antibiotics encouraged.  >2.00 ng/mL  High risk of sepsis/shock. Antibiotics strongly encouraged.     Suspected Lower Resp Tract Infections:  <0.24 ng/mL    Low likelihood of bacterial infection.  >0.24 ng/mL    Increased likelihood of bacterial infection. Antibiotics encouraged.     With successful antibiotic therapy, PCT levels should decrease rapidly. (Half-life of 24 to 36 hours)     Procalcitonin values from samples collected within the first 6 hours of systemic infection may still be low. Retesting may be indicated.  Values from day 1 and day 4 can be entered into the Change in Procalcitonin Calculator (www.[employer] [facility]antagepct-calculator.[employer]) to determine the patient's Mortality Risk Prognosis.     In healthy neonates, plasma Procalcitonin (PCT) concentrations increase gradually after birth, reaching peak values at about 24 hours of age then decrease to normal values below 0                           .5 ng/mL by 48-72 hours of age.      WBC 04/30/2024 10.5  4.1 - 11.1 K/uL Final    RBC 04/30/2024 4.52  4.10 - 5.70 M/uL Final    Hemoglobin 04/30/2024 13.7  12.1 - 17.0 g/dL Final    Hematocrit 04/30/2024 40.9  36.6 - 50.3 % Final    MCV 04/30/2024 90.5  80.0 - 99.0 FL Final    MCH 04/30/2024 30.3  26.0 - 34.0 PG Final    MCHC 04/30/2024 33.5  30.0 - 36.5 g/dL Final    RDW 04/30/2024 12.8  11.5 - 14.5 % Final    Platelets 04/30/2024 214  150 - 400 K/uL Final    MPV 04/30/2024 10.5  8.9 - 12.9 FL Final    Nucleated RBCs 04/30/2024 0.0  0  WBC Final    nRBC 04/30/2024 0.00  0.00 - 0.01 K/uL Final    Neutrophils % 04/30/2024 78 (H)  32 - 75 % Final    Lymphocytes % 04/30/2024 10 (L)  12 - 49 % Final    Monocytes % 04/30/2024 9  5 - 13 % Final    Eosinophils % 04/30/2024 2  0 - 7 % Final    Basophils % 04/30/2024 0  0 - 1 % Final

## 2024-05-01 NOTE — TELEPHONE ENCOUNTER
Hello,    Can this patient be scheduled for hospital follow-up in 4 to 8 weeks with any other neurology providers?    Thanks,  Yoel

## 2024-05-02 ENCOUNTER — TELEPHONE (OUTPATIENT)
Age: 79
End: 2024-05-02

## 2024-05-02 NOTE — TELEPHONE ENCOUNTER
Patient wife, Avril, stated patient saw Yoel Dipak in hospital on 5/1 and she recommended a neuropsych test for alzheimer's dementia. Please give them a call back at . Thank you.

## 2024-05-04 LAB
BACTERIA SPEC CULT: NORMAL
BACTERIA SPEC CULT: NORMAL
SERVICE CMNT-IMP: NORMAL
SERVICE CMNT-IMP: NORMAL

## 2024-06-04 ENCOUNTER — OFFICE VISIT (OUTPATIENT)
Age: 79
End: 2024-06-04
Payer: MEDICARE

## 2024-06-04 VITALS
WEIGHT: 194 LBS | OXYGEN SATURATION: 98 % | HEART RATE: 78 BPM | RESPIRATION RATE: 16 BRPM | DIASTOLIC BLOOD PRESSURE: 72 MMHG | SYSTOLIC BLOOD PRESSURE: 128 MMHG | HEIGHT: 66 IN | BODY MASS INDEX: 31.18 KG/M2

## 2024-06-04 DIAGNOSIS — Z91.89 STROKE RISK: ICD-10-CM

## 2024-06-04 DIAGNOSIS — R41.3 MEMORY LOSS: Primary | ICD-10-CM

## 2024-06-04 DIAGNOSIS — Z09 HOSPITAL DISCHARGE FOLLOW-UP: ICD-10-CM

## 2024-06-04 PROCEDURE — 3078F DIAST BP <80 MM HG: CPT

## 2024-06-04 PROCEDURE — G8427 DOCREV CUR MEDS BY ELIG CLIN: HCPCS

## 2024-06-04 PROCEDURE — 99215 OFFICE O/P EST HI 40 MIN: CPT

## 2024-06-04 PROCEDURE — G8417 CALC BMI ABV UP PARAM F/U: HCPCS

## 2024-06-04 PROCEDURE — 3074F SYST BP LT 130 MM HG: CPT

## 2024-06-04 PROCEDURE — 1036F TOBACCO NON-USER: CPT

## 2024-06-04 PROCEDURE — 1123F ACP DISCUSS/DSCN MKR DOCD: CPT

## 2024-06-04 RX ORDER — NITROGLYCERIN 0.4 MG/1
0.4 TABLET SUBLINGUAL EVERY 5 MIN PRN
COMMUNITY

## 2024-06-04 RX ORDER — DONEPEZIL HYDROCHLORIDE 5 MG/1
10 TABLET, FILM COATED ORAL NIGHTLY
Qty: 180 TABLET | Refills: 3 | Status: SHIPPED | OUTPATIENT
Start: 2024-06-04

## 2024-06-04 ASSESSMENT — PATIENT HEALTH QUESTIONNAIRE - PHQ9
SUM OF ALL RESPONSES TO PHQ QUESTIONS 1-9: 0
1. LITTLE INTEREST OR PLEASURE IN DOING THINGS: NOT AT ALL
SUM OF ALL RESPONSES TO PHQ QUESTIONS 1-9: 0
SUM OF ALL RESPONSES TO PHQ9 QUESTIONS 1 & 2: 0
2. FEELING DOWN, DEPRESSED OR HOPELESS: NOT AT ALL
SUM OF ALL RESPONSES TO PHQ QUESTIONS 1-9: 0
SUM OF ALL RESPONSES TO PHQ QUESTIONS 1-9: 0

## 2024-06-04 NOTE — PATIENT INSTRUCTIONS
As per our discussion,    Overall, you seem stable without any worsening in your symptoms.    I encourage you to continue to take your medication as prescribed and continue to follow-up with your primary care provider and your other specialists for other comorbid conditions as appropriate.    As we discussed treatment options in detail including risks/benefits and expectations for memory loss.  While medication is not likely to made a drastic difference, it may aid modestly in improving concentration and attention.  I will send you additional refills for the donepezil.  In 90 days, increase to 2 tablets at bedtime.    - Medication titration and addition of adjunctive agents may be necessary to achieve maximum benefit.      - Discussed that he would benefit from designation of a POA to assist with executive decision making.  Finances and medication administration should be monitored to ensure accuracy and prevent errors.     I encouraged you to engage in approximately 2.5 hours of physician approved physical activity on a weekly basis.    Maintain a healthy diet. they will also be informed of the Mediterranean diet, which has been associated with reduced risk for neurodegenerative conditions as well as heart disease.    Maintain a cognitively stimulated lifestyle, also incorporating social interaction.    As far as driving, will refer you to [facility] for driving simulation.  Someone will call you for scheduling.    As for the memory, will try to move up your neuropsych testing sooner than December 2024 to see whether you can drive.  Please DO NOT drive until instructed by neuropsych evaluation.    It is always a pleasure to see you and meet your wife Avril    Will see you back in 6 months or sooner if needed    Please do not hesitate to reach out for any questions or concerns

## 2024-06-04 NOTE — PROGRESS NOTES
1. Have you been to the ER, urgent care clinic since your last visit?  Hospitalized since your last visit?  Seen on 4/28/24    2. Have you seen or consulted any other health care providers outside of the [facility] since your last visit?  Include any pap smears or colon screening.   No.        Chief Complaint   Patient presents with    Memory Loss     Hospital follow up- short term memory issues- bill paying issues and when driving gets confused       
spelling errors.  Other human spelling and other errors may be present.  Corrections may be executed at a later time.  Please feel free to contact us for any clarifications as needed.         KELLY Brooks NP

## 2024-06-05 PROBLEM — Z09 HOSPITAL DISCHARGE FOLLOW-UP: Status: ACTIVE | Noted: 2024-06-05

## 2024-06-05 PROBLEM — Z91.89 STROKE RISK: Status: ACTIVE | Noted: 2024-06-05

## 2024-06-05 PROBLEM — R41.3 MEMORY LOSS: Status: ACTIVE | Noted: 2024-06-05

## 2024-06-05 ASSESSMENT — ENCOUNTER SYMPTOMS
EYES NEGATIVE: 1
ALLERGIC/IMMUNOLOGIC NEGATIVE: 1
GASTROINTESTINAL NEGATIVE: 1
RESPIRATORY NEGATIVE: 1

## 2024-06-05 NOTE — ASSESSMENT & PLAN NOTE
Patient denied any staring episodes and confusion and would like to know if he can go back to work.  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    His current neurological examination reveals mild cognitive impairment.    He has been taking donepezil 5 mg at bedtime and denied any side effects.  We will not make any changes on donepezil at this time and patient is to increase donepezil to 10 mg after being on it for 90 days.  Additional refills were sent to his pharmacy.    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    For his safety and the safety of others, we will refer him to [facility] for driving evaluation to test his reaction time.   He also had an appointment scheduled with Dr. Theodore for neuropsych evaluation in December 2024.  However, given the urgency of patient wanting to drive, I have personally reached out to Dr. Theodore's nurse to see if they can give him a sooner appointment and place him in the cancellation list.    Those 2 tests will help us determine whether patient is deemed safe to drive.  He verbalized understanding and agreed with plan of care.    Will not repeat any imaging given they were recently completed.  His brain MRI on 4/30/2024 showed no acute intracranial abnormality. Generalized parenchymal volume loss with disproportionate atrophy of the bilateral hippocampi and mesial temporal lobes, suggestive of Alzheimer's dementia. Moderate chronic microvascular ischemic disease.    Routine EEG on 4/29/2024, was normal    TSH, folate, ammonia, vitamin B12 were all unremarkable.    Patient denied any anxiety/depression however, he is on Zoloft 50 mg daily.  He is to continue Zoloft as prescribed.    As we discussed treatment options in detail including

## 2024-06-05 NOTE — ASSESSMENT & PLAN NOTE
Patient denied any strokelike symptoms.    History of close factors include hypertension, dyslipidemia    Patient is to continue on aspirin 81 mg and simvastatin 20 mg daily.    Patient is to continue to work to all of his comorbid conditions as managed by PCP and other specialists as appropriate.    Education was provided today in regards to risk factors for stroke and lifestyle modifications to help minimize the risk of future stroke.    This included medication compliance, regular follow up with primary care physician,  and healthy lifestyle habits (nutrition/exercise).

## 2024-06-06 ENCOUNTER — CLINICAL DOCUMENTATION (OUTPATIENT)
Age: 79
End: 2024-06-06

## 2024-06-10 ENCOUNTER — TELEPHONE (OUTPATIENT)
Age: 79
End: 2024-06-10

## 2024-06-10 DIAGNOSIS — R41.3 MEMORY LOSS: Primary | ICD-10-CM

## 2024-06-10 RX ORDER — DONEPEZIL HYDROCHLORIDE 10 MG/1
10 TABLET, FILM COATED ORAL NIGHTLY
Qty: 30 TABLET | Refills: 5 | Status: SHIPPED | OUTPATIENT
Start: 2024-06-10

## 2024-06-10 NOTE — TELEPHONE ENCOUNTER
Message  Received: Today  Cecilia Ayala APRN - Maryse Bernabe, LPN  Caller: Unspecified (Today, 11:22 AM)  I sent in a rx for 10mg, but please advise pt to follow the instructions that Yoel had given him (staying at the lower does for 90 days, 5mg, he can break the 10mg in half).  EN        Called pt - no answer and voice mail box is full, could not leave a message.

## 2024-06-10 NOTE — TELEPHONE ENCOUNTER
Received fax from [employer] stating Donepezil 5 mg- the insurance will only pay for 1 tablet at a time.  Can we change this to 10 mg tab nightly? Please advise, thanks.  Last appt 6/4/24 with Yoel.

## 2024-06-11 NOTE — TELEPHONE ENCOUNTER
Tried to call for 2nd time. No answer and voice mail box is full.       Called Avril wife on PHI-advised I tried to reach out to pt but no luck. Relayed message below to her regarding medication change due to insurance. She advised Yoel discussed with them at the last office visit to go up in mg on the donepezil in July. I advised if that is what was discussed then that is ok. Do as she advised at the last office visit.  Verified the new script was sent to the pharmacy yesterday. Avril verbalized understanding and thanked me for the call.

## 2024-06-14 ENCOUNTER — TELEPHONE (OUTPATIENT)
Age: 79
End: 2024-06-14

## 2024-06-14 NOTE — TELEPHONE ENCOUNTER
Patient spouse, Avril states Freednorman FeldmanOtis told them to call the office and speak with Ana to get a sooner appt with Dr. Theodore. Please call them back at . Thank you.

## 2024-06-14 NOTE — TELEPHONE ENCOUNTER
Chris/[facility] states that pt is scheduled for 6/17 at [facility] for OT. They would like to add for pt to have the driving stimulation. Please fax order to [phone] office [phone]

## 2024-06-17 NOTE — TELEPHONE ENCOUNTER
Returned call and left message letting them know the appointment is on the wait list and we will call if we get any opens.  Please call back with any questions.

## 2024-06-19 ENCOUNTER — OFFICE VISIT (OUTPATIENT)
Age: 79
End: 2024-06-19
Payer: MEDICARE

## 2024-06-19 DIAGNOSIS — G30.1 MILD LATE ONSET ALZHEIMER'S DEMENTIA WITH MOOD DISTURBANCE (HCC): Primary | ICD-10-CM

## 2024-06-19 DIAGNOSIS — F02.A3 MILD LATE ONSET ALZHEIMER'S DEMENTIA WITH MOOD DISTURBANCE (HCC): Primary | ICD-10-CM

## 2024-06-19 PROCEDURE — 1036F TOBACCO NON-USER: CPT | Performed by: CLINICAL NEUROPSYCHOLOGIST

## 2024-06-19 PROCEDURE — 90791 PSYCH DIAGNOSTIC EVALUATION: CPT | Performed by: CLINICAL NEUROPSYCHOLOGIST

## 2024-06-19 PROCEDURE — 1123F ACP DISCUSS/DSCN MKR DOCD: CPT | Performed by: CLINICAL NEUROPSYCHOLOGIST

## 2024-06-19 PROCEDURE — 90785 PSYTX COMPLEX INTERACTIVE: CPT | Performed by: CLINICAL NEUROPSYCHOLOGIST

## 2024-06-19 NOTE — PROGRESS NOTES
will be helpful to refine differential diagnosis and treatment planning.  The patient is currently scheduled for evaluation on 6/24/2024 and report will be attached to that encounter.    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    PLAN:  Patient will participate in comprehensive evaluation in order to obtain a quantitative assessment of their cognitive and emotional functioning  Differential diagnosis and treatment planning will be based upon results from clinical interview and objective testing  Patient will be provided with review of results, impressions, and recommendations during feedback session  Patient will be encouraged to follow-up with referring provider for ongoing management    TIME DOCUMENTATION:  Clinical Interview: 4582 - 3683 = 25 minutes    Visit was complicated by Patient expressed desire to have others present during visit. Provider spent 10 minutes providing interactive complexity services due to need to obtain or communicate information to those involved in patient's care. The session included the use of the following adaptations in order to overcome the difficulties. Others were included in order to obtain collateral history for individual with diminished insight and episodic memory loss .      BILLING:  90791x1  90785x1

## 2024-06-24 ENCOUNTER — PROCEDURE VISIT (OUTPATIENT)
Age: 79
End: 2024-06-24
Payer: MEDICARE

## 2024-06-24 DIAGNOSIS — F02.80 MIXED ALZHEIMER'S AND VASCULAR DEMENTIA (HCC): Primary | ICD-10-CM

## 2024-06-24 DIAGNOSIS — F01.50 MIXED ALZHEIMER'S AND VASCULAR DEMENTIA (HCC): Primary | ICD-10-CM

## 2024-06-24 DIAGNOSIS — G30.9 MIXED ALZHEIMER'S AND VASCULAR DEMENTIA (HCC): Primary | ICD-10-CM

## 2024-06-24 PROCEDURE — 96133 NRPSYC TST EVAL PHYS/QHP EA: CPT | Performed by: CLINICAL NEUROPSYCHOLOGIST

## 2024-06-24 PROCEDURE — 96139 PSYCL/NRPSYC TST TECH EA: CPT | Performed by: CLINICAL NEUROPSYCHOLOGIST

## 2024-06-24 PROCEDURE — 96132 NRPSYC TST EVAL PHYS/QHP 1ST: CPT | Performed by: CLINICAL NEUROPSYCHOLOGIST

## 2024-06-24 PROCEDURE — 96138 PSYCL/NRPSYC TECH 1ST: CPT | Performed by: CLINICAL NEUROPSYCHOLOGIST

## 2024-07-03 ENCOUNTER — CLINICAL DOCUMENTATION (OUTPATIENT)
Age: 79
End: 2024-07-03

## 2024-07-03 NOTE — PROGRESS NOTES
Received OT Driving Evaluation from [facility]. Yoel Quesada NP reviewed and signed. Faxed to [phone] and received fax confirmation. Placed in fast scan.

## 2024-07-05 PROBLEM — Z09 HOSPITAL DISCHARGE FOLLOW-UP: Status: RESOLVED | Noted: 2024-06-05 | Resolved: 2024-07-05

## 2024-07-23 ENCOUNTER — CLINICAL DOCUMENTATION (OUTPATIENT)
Age: 79
End: 2024-07-23

## 2024-07-23 PROBLEM — F02.80 MIXED ALZHEIMER'S AND VASCULAR DEMENTIA (HCC): Status: ACTIVE | Noted: 2024-07-23

## 2024-07-23 PROBLEM — G30.9 MIXED ALZHEIMER'S AND VASCULAR DEMENTIA (HCC): Status: ACTIVE | Noted: 2024-07-23

## 2024-07-23 PROBLEM — F01.50 MIXED ALZHEIMER'S AND VASCULAR DEMENTIA (HCC): Status: ACTIVE | Noted: 2024-07-23

## 2024-07-23 NOTE — PROGRESS NOTES
Received [facility] OT notes on driving evaluation. Yoel reviewed and signed. Placed in fast scan.

## 2024-07-23 NOTE — PROGRESS NOTES
Results:  For standardized tests, performance was compared to a demographically matched sample of neurocognitively healthy adults using the following classification system to indicate deviation from mean (or average) test performance:    Normally Distributed Not-Normally Distributed   Descriptor Percentile Descriptor Percentile   \"Exceptionally High\" >97th \"Within Normal Limits\" >24th   \"Above Average\" 91st - 97th \"Low Average\" 9th - 24th   \"High Average\" 75th - 90th \"Below Average\" 2nd - 8th   \"Average\" 25th - 74th \"Exceptionally Low\" <2nd   \"Low Average\" 9th - 24th    \"Below Average\" 2nd - 8th    \"Exceptionally Low\" <2nd      Performance and symptom validity are analyzed in a number of ways, including administration of neuropsychological measures that have been empirically shown to identify suboptimal performance or purposeful exaggeration. These tests and their scores have been redacted from this report in order to ensure test security, but are available to formally trained neuropsychologists upon request. In addition, when possible, the overall pattern of performance is analyzed for consistency between measures, consistency with the expected severity of impairment, and the presenting symptoms are compared against base rates of symptoms in other patients with similar problems. The patient's performances were within acceptable limits, indicating the results of the present evaluation are believed to be valid and reliable.    Premorbid Functioning:   Low average  Attention:   Brief auditory attention: Average  Auditory working memory: Below average to average  Processing Speed:  Average  Executive Functioning:   Mental set switching: Discontinued due to time limits.  During the allotted time (5 minutes), the patient successfully completed 10 out of 24 transitions.  He made 3 sequencing errors  Problem Solving: Low average  Inhibition: Discontinued during practice due to excessive errors  Inhibition/Switching:

## 2024-07-24 ENCOUNTER — OFFICE VISIT (OUTPATIENT)
Age: 79
End: 2024-07-24
Payer: MEDICARE

## 2024-07-24 DIAGNOSIS — F02.80 MIXED ALZHEIMER'S AND VASCULAR DEMENTIA (HCC): Primary | ICD-10-CM

## 2024-07-24 DIAGNOSIS — F01.50 MIXED ALZHEIMER'S AND VASCULAR DEMENTIA (HCC): Primary | ICD-10-CM

## 2024-07-24 DIAGNOSIS — G30.9 MIXED ALZHEIMER'S AND VASCULAR DEMENTIA (HCC): Primary | ICD-10-CM

## 2024-07-24 PROCEDURE — 96132 NRPSYC TST EVAL PHYS/QHP 1ST: CPT | Performed by: CLINICAL NEUROPSYCHOLOGIST

## 2024-07-24 NOTE — PROGRESS NOTES
Prior to seeing the patient for today's visit, I reviewed pertinent records, including the previously completed report, the records in Epic, and any updated visits from other providers since the patient's last visit.    I provided feedback services related to the previously completed report. Attendees included: Patient and Spouse. Education was provided regarding my diagnostic impressions, and we discussed treatment plan/options. Attendees were provided with the opportunity to ask questions, which were answered to the best of my ability.    We discussed, in detail, the following:  Reviewed findings from evaluation including test results, diagnosis, and suspected contributing factors  Discussed recommendations outlined in report  Answered questions to the best of my ability    The patient needs to:   Follow up with referring provider for ongoing management, Use practical strategies to compensate for cognitive weaknesses (See handout), Emphasize modifiable risk and protective factors for cognitive functioning (e.g., exercise, diet, cognitive stimulation; see handout), and Access community resources we discussed for additional support (See handout)    The patient had the following reactions to recommendations: Patient's wife reported he had the driving simulator done and he completed the evaluation on 2 occasions.  On both tests, it was recommended that he not drive.  I would agree with this recommendation given the simulator findings and our evaluation.  Patient and his wife reported understanding results and recommendations.    ASSESSMENT:  Mixed Alzheimer's and vascular dementia    TIME SPENT PROVIDING SERVICES:   31 minutes     BILLING:  96132 x 1 Units    *Code 38007 Neuropsychological testing evaluation services include: Integration of patient data, interpretation of standardized test results and clinical data, clinical decision-making, treatment planning and report, and interactive feedback to the patient,

## 2025-01-02 ASSESSMENT — ENCOUNTER SYMPTOMS
ALLERGIC/IMMUNOLOGIC NEGATIVE: 1
GASTROINTESTINAL NEGATIVE: 1
EYES NEGATIVE: 1
RESPIRATORY NEGATIVE: 1

## 2025-01-03 ENCOUNTER — OFFICE VISIT (OUTPATIENT)
Age: 80
End: 2025-01-03
Payer: MEDICARE

## 2025-01-03 VITALS
SYSTOLIC BLOOD PRESSURE: 140 MMHG | HEART RATE: 100 BPM | DIASTOLIC BLOOD PRESSURE: 82 MMHG | WEIGHT: 190 LBS | RESPIRATION RATE: 16 BRPM | BODY MASS INDEX: 30.53 KG/M2 | OXYGEN SATURATION: 99 % | HEIGHT: 66 IN

## 2025-01-03 DIAGNOSIS — F01.50 MIXED ALZHEIMER'S AND VASCULAR DEMENTIA (HCC): ICD-10-CM

## 2025-01-03 DIAGNOSIS — G30.9 MIXED ALZHEIMER'S AND VASCULAR DEMENTIA (HCC): ICD-10-CM

## 2025-01-03 DIAGNOSIS — R29.6 MULTIPLE FALLS: Primary | ICD-10-CM

## 2025-01-03 DIAGNOSIS — Z91.89 STROKE RISK: ICD-10-CM

## 2025-01-03 DIAGNOSIS — F02.80 MIXED ALZHEIMER'S AND VASCULAR DEMENTIA (HCC): ICD-10-CM

## 2025-01-03 DIAGNOSIS — R25.1 TREMOR: ICD-10-CM

## 2025-01-03 DIAGNOSIS — E55.9 VITAMIN D DEFICIENCY: ICD-10-CM

## 2025-01-03 PROCEDURE — 99215 OFFICE O/P EST HI 40 MIN: CPT

## 2025-01-03 RX ORDER — MEMANTINE HYDROCHLORIDE 5 MG/1
TABLET ORAL
Qty: 60 TABLET | Refills: 5 | Status: SHIPPED | OUTPATIENT
Start: 2025-01-03

## 2025-01-03 RX ORDER — SENNOSIDES 8.6 MG
650 CAPSULE ORAL EVERY 8 HOURS PRN
COMMUNITY
Start: 2024-11-29

## 2025-01-03 RX ORDER — DONEPEZIL HYDROCHLORIDE 10 MG/1
10 TABLET, FILM COATED ORAL NIGHTLY
Qty: 30 TABLET | Refills: 5 | Status: SHIPPED | OUTPATIENT
Start: 2025-01-03

## 2025-01-03 ASSESSMENT — PATIENT HEALTH QUESTIONNAIRE - PHQ9
1. LITTLE INTEREST OR PLEASURE IN DOING THINGS: NOT AT ALL
2. FEELING DOWN, DEPRESSED OR HOPELESS: NOT AT ALL
SUM OF ALL RESPONSES TO PHQ QUESTIONS 1-9: 0
SUM OF ALL RESPONSES TO PHQ QUESTIONS 1-9: 0
SUM OF ALL RESPONSES TO PHQ9 QUESTIONS 1 & 2: 0
SUM OF ALL RESPONSES TO PHQ QUESTIONS 1-9: 0
SUM OF ALL RESPONSES TO PHQ QUESTIONS 1-9: 0

## 2025-01-03 NOTE — PROGRESS NOTES
1. Have you been to the ER, urgent care clinic since your last visit?  Hospitalized since your last visit?  Seen on 12/4/24    2. Have you seen or consulted any other health care providers outside of the [facility] since your last visit?  Include any pap smears or colon screening.   No.      Chief Complaint   Patient presents with    Dementia     Follow up after memory testing- several falls since here last, shuffling more        
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     For his safety and the safety of others, we will refer him to [facility] for driving evaluation to test his reaction time.   He also had an appointment scheduled with Dr. Theodore for neuropsych evaluation in December 2024.  However, given the urgency of patient wanting to drive, I have personally reached out to Dr. Theodore's nurse to see if they can give him a sooner appointment and place him in the cancellation list.     Those 2 tests will help us determine whether patient is deemed safe to drive.  He verbalized understanding and agreed with plan of care.     Will not repeat any imaging given they were recently completed.  His brain MRI on 4/30/2024 showed no acute intracranial abnormality. Generalized parenchymal volume loss with disproportionate atrophy of the bilateral hippocampi and mesial temporal lobes, suggestive of Alzheimer's dementia. Moderate chronic microvascular ischemic disease.     Routine EEG on 4/29/2024, was normal     TSH, folate, ammonia, vitamin B12 were all unremarkable.     Patient denied any anxiety/depression however, he is on Zoloft 50 mg daily.  He is to continue Zoloft as prescribed.     As we discussed treatment options in detail including risks/benefits and expectations.  While medication is not likely to made a drastic difference, it may aid modestly in improving concentration and attention.    - Medication titration and addition of adjunctive agents may be necessary to achieve maximum benefit.    - Discussed that he would benefit from designation of a POA to assist with executive decision making.  Finances and medication administration should be monitored to ensure accuracy and prevent errors.      Patient was encouraged to engage in approximately 2.5 hours of physician approved physical activity

## 2025-01-03 NOTE — PATIENT INSTRUCTIONS
As per discussion,    For your memory, I will not make any changes on donepezil at this time.  Will continue donepezil 10 mg nightly.    Will add Namenda 5 mg in which she can take 1 tablet daily for 2 weeks and increase to 2 tablets thereafter.  Prescription for memantine was sent to her pharmacy.    As we discussed treatment options in detail including risks/benefits and expectations.  While medication is not likely to made a drastic difference, it may aid modestly in improving concentration and attention.    - Medication titration and addition of adjunctive agents may be necessary to achieve maximum benefit.    - Explained that dementia is a progressive disease process.      I encouraged you to engage in approximately 2.5 hours of physician approved physical activity on a weekly basis.    To maintain a healthy diet. Also was informed of the Mediterranean diet, which has been associated with reduced risk for neurodegenerative conditions as well as heart disease.    To maintain a cognitively stimulated lifestyle, also incorporating social interaction.    As for the multiple falls, tremor on the right upper extremity, will obtain a DaTscan to rule out any Parkinson's.  However, there are other factors that may also cause her symptoms which include medication side effects.  Will reach out to you when we get the results and we will discuss plan of care.    In the meantime, advised that you take your time when you are walking or when you are switching position to prevent falls.    Continue to follow-up with your primary care provider and your other specialists as appropriate.    It was a pleasure to see you and your wife today    Will see back in 6 months or sooner if needed    Please do not hesitate to reach out for any questions or concerns for

## 2025-01-05 PROBLEM — R29.6 MULTIPLE FALLS: Status: ACTIVE | Noted: 2025-01-05

## 2025-01-05 PROBLEM — R25.1 TREMOR: Status: ACTIVE | Noted: 2025-01-05

## 2025-01-05 PROBLEM — E55.9 VITAMIN D DEFICIENCY: Status: ACTIVE | Noted: 2025-01-05

## 2025-01-05 RX ORDER — ERGOCALCIFEROL 1.25 MG/1
50000 CAPSULE, LIQUID FILLED ORAL WEEKLY
Qty: 12 CAPSULE | Refills: 3 | Status: SHIPPED | OUTPATIENT
Start: 2025-01-05

## 2025-01-06 ENCOUNTER — CLINICAL DOCUMENTATION (OUTPATIENT)
Age: 80
End: 2025-01-06

## 2025-01-06 NOTE — PROGRESS NOTES
Faxed VIRGILIO form, last office note, demographics and insurance cards to [facility] at [phone] and received fax confirmation.     Placed copy in fast scan.

## 2025-01-07 ENCOUNTER — TELEPHONE (OUTPATIENT)
Age: 80
End: 2025-01-07

## 2025-01-07 NOTE — TELEPHONE ENCOUNTER
----- Message from KELLY Brooks - NP sent at 1/5/2025  1:02 PM EST -----  Maryse,    I just noticed patient has a low vitamin D level while he was at [facility] on 11/28/2024.  I went in and send him a prescription for vitamin D in which she can take 1 capsule once a week.  Will check level in 3 to 6 months.    Thank you,  Dipak        Called wife , Avril on PHI, advised of message above. Verified vitamin d was sent to pharmacy on 1/5/25. Read out Rx to her. She verbalized understanding and would get the script for the pt.

## 2025-03-20 ENCOUNTER — TELEPHONE (OUTPATIENT)
Age: 80
End: 2025-03-20

## 2025-03-20 NOTE — TELEPHONE ENCOUNTER
Patient wife, Avril calling to ask about results. They had a test done at [facility] on Monday that determines if he had parkinsons or not. She states she sees the results on mychart but she can't understand them.    Please call her back at .   Thank you.

## 2025-03-20 NOTE — TELEPHONE ENCOUNTER
Called [facility] and spoke to Sheree, to fax over th VIRGILIO scan results. She advised she would get that faxed right over.        Called Avril on PHI, advised we could not see the results so I called [facility] to fax over the results. They are faxing over today. Once we receive and Yoel reviews. We will reach out to her in regards to the results. Avril verbalized understanding.

## 2025-03-26 ENCOUNTER — TELEPHONE (OUTPATIENT)
Age: 80
End: 2025-03-26

## 2025-03-26 DIAGNOSIS — G20.B1 PARKINSON'S DISEASE WITH DYSKINESIA WITHOUT FLUCTUATING MANIFESTATIONS (HCC): Primary | ICD-10-CM

## 2025-03-26 RX ORDER — CARBIDOPA AND LEVODOPA 25; 100 MG/1; MG/1
1 TABLET, EXTENDED RELEASE ORAL 2 TIMES DAILY
Qty: 60 TABLET | Refills: 5 | Status: SHIPPED | OUTPATIENT
Start: 2025-03-26

## 2025-03-26 NOTE — TELEPHONE ENCOUNTER
I have reached out to patient via phone and was able to talk to his wife Mrs. Mackenzie.  2 forms of ID were used.  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      Given the DaTscan result, we will start patient on carbidopa levodopa  mg twice daily.  Will adjust medication based on tolerability or symptoms.  Prescription was sent to his pharmacy.    The disease process and management was explained to wife.  She verbalized understanding and agreed with plan of care.

## 2025-03-31 ENCOUNTER — CLINICAL DOCUMENTATION (OUTPATIENT)
Age: 80
End: 2025-03-31

## 2025-03-31 NOTE — PROGRESS NOTES
Received VIRGILIO scan from [facility]. Yoel reviewed and signed. She addressed this in another encounter.   Placed in fast scan.

## 2025-04-04 ENCOUNTER — TELEPHONE (OUTPATIENT)
Age: 80
End: 2025-04-04

## 2025-04-04 NOTE — TELEPHONE ENCOUNTER
Patients wife Avril states that on Wednesday pt recvd 2 knee and 2 wrist braces through [employer] and they would like to know the reason why? Please call her at [phone]

## 2025-04-04 NOTE — TELEPHONE ENCOUNTER
Returned call to wife Avril on PHI, advised I am looking in chart at last 2 office visit-I do not see where a Rx was sent in for the braces. I looked at the VIRGILIO SCAN results- Yoel did not mention any braces in that either. Asked if there was a provider listed on the box he received-she said no-his name was on the box. She called [employer] and they did not give her a provider name either. Asked if pt has seen Ortho recently, no he has not. Asked if PCP could have done this? She said he has not seen PCP in quite a long time. I advised I will send to Yoel and see if she remembers sending in this Rx, as I do not see anything in the chart advising she did. I will call back next week after Yoel returns to office. Avril verbalized understanding.

## 2025-04-07 ENCOUNTER — APPOINTMENT (OUTPATIENT)
Facility: HOSPITAL | Age: 80
DRG: 871 | End: 2025-04-07
Payer: MEDICARE

## 2025-04-07 ENCOUNTER — HOSPITAL ENCOUNTER (INPATIENT)
Facility: HOSPITAL | Age: 80
LOS: 13 days | DRG: 871 | End: 2025-04-20
Attending: STUDENT IN AN ORGANIZED HEALTH CARE EDUCATION/TRAINING PROGRAM | Admitting: INTERNAL MEDICINE
Payer: MEDICARE

## 2025-04-07 DIAGNOSIS — J90 PLEURAL EFFUSION: ICD-10-CM

## 2025-04-07 DIAGNOSIS — J18.9 COMMUNITY ACQUIRED PNEUMONIA, UNSPECIFIED LATERALITY: ICD-10-CM

## 2025-04-07 DIAGNOSIS — I50.9 CONGESTIVE HEART FAILURE, UNSPECIFIED HF CHRONICITY, UNSPECIFIED HEART FAILURE TYPE (HCC): ICD-10-CM

## 2025-04-07 DIAGNOSIS — J96.01 ACUTE RESPIRATORY FAILURE WITH HYPOXIA (HCC): Primary | ICD-10-CM

## 2025-04-07 PROBLEM — A41.9 SEPSIS (HCC): Status: ACTIVE | Noted: 2025-04-07

## 2025-04-07 LAB
ALBUMIN SERPL-MCNC: 3.1 G/DL (ref 3.5–5)
ALBUMIN/GLOB SERPL: 0.9 (ref 1.1–2.2)
ALP SERPL-CCNC: 82 U/L (ref 45–117)
ALT SERPL-CCNC: 8 U/L (ref 12–78)
ANION GAP SERPL CALC-SCNC: 8 MMOL/L (ref 2–12)
AST SERPL-CCNC: 14 U/L (ref 15–37)
BASOPHILS # BLD: 0.03 K/UL (ref 0–0.1)
BASOPHILS NFR BLD: 0.2 % (ref 0–1)
BILIRUB SERPL-MCNC: 0.7 MG/DL (ref 0.2–1)
BUN SERPL-MCNC: 24 MG/DL (ref 6–20)
BUN/CREAT SERPL: 21 (ref 12–20)
CALCIUM SERPL-MCNC: 9.2 MG/DL (ref 8.5–10.1)
CHLORIDE SERPL-SCNC: 99 MMOL/L (ref 97–108)
CO2 SERPL-SCNC: 23 MMOL/L (ref 21–32)
CREAT SERPL-MCNC: 1.17 MG/DL (ref 0.7–1.3)
DIFFERENTIAL METHOD BLD: ABNORMAL
EOSINOPHIL # BLD: 0.01 K/UL (ref 0–0.4)
EOSINOPHIL NFR BLD: 0.1 % (ref 0–7)
ERYTHROCYTE [DISTWIDTH] IN BLOOD BY AUTOMATED COUNT: 16 % (ref 11.5–14.5)
FLUAV RNA SPEC QL NAA+PROBE: NOT DETECTED
FLUBV RNA SPEC QL NAA+PROBE: NOT DETECTED
GLOBULIN SER CALC-MCNC: 3.4 G/DL (ref 2–4)
GLUCOSE SERPL-MCNC: 130 MG/DL (ref 65–100)
HCT VFR BLD AUTO: 34.4 % (ref 36.6–50.3)
HGB BLD-MCNC: 10.9 G/DL (ref 12.1–17)
IMM GRANULOCYTES # BLD AUTO: 0.07 K/UL (ref 0–0.04)
IMM GRANULOCYTES NFR BLD AUTO: 0.5 % (ref 0–0.5)
LYMPHOCYTES # BLD: 0.54 K/UL (ref 0.8–3.5)
LYMPHOCYTES NFR BLD: 3.7 % (ref 12–49)
MAGNESIUM SERPL-MCNC: 1.9 MG/DL (ref 1.6–2.4)
MCH RBC QN AUTO: 26.6 PG (ref 26–34)
MCHC RBC AUTO-ENTMCNC: 31.7 G/DL (ref 30–36.5)
MCV RBC AUTO: 83.9 FL (ref 80–99)
MONOCYTES # BLD: 0.71 K/UL (ref 0–1)
MONOCYTES NFR BLD: 4.9 % (ref 5–13)
NEUTS SEG # BLD: 13.14 K/UL (ref 1.8–8)
NEUTS SEG NFR BLD: 90.6 % (ref 32–75)
NRBC # BLD: 0 K/UL (ref 0–0.01)
NRBC BLD-RTO: 0 PER 100 WBC
PLATELET # BLD AUTO: 362 K/UL (ref 150–400)
PMV BLD AUTO: 10.8 FL (ref 8.9–12.9)
POTASSIUM SERPL-SCNC: 4.3 MMOL/L (ref 3.5–5.1)
PROCALCITONIN SERPL-MCNC: <0.05 NG/ML
PROT SERPL-MCNC: 6.5 G/DL (ref 6.4–8.2)
RBC # BLD AUTO: 4.1 M/UL (ref 4.1–5.7)
RBC MORPH BLD: ABNORMAL
SARS-COV-2 RNA RESP QL NAA+PROBE: NOT DETECTED
SODIUM SERPL-SCNC: 130 MMOL/L (ref 136–145)
SOURCE: NORMAL
TROPONIN I SERPL HS-MCNC: 30 NG/L (ref 0–76)
WBC # BLD AUTO: 14.5 K/UL (ref 4.1–11.1)

## 2025-04-07 PROCEDURE — 99285 EMERGENCY DEPT VISIT HI MDM: CPT

## 2025-04-07 PROCEDURE — 82330 ASSAY OF CALCIUM: CPT

## 2025-04-07 PROCEDURE — 82947 ASSAY GLUCOSE BLOOD QUANT: CPT

## 2025-04-07 PROCEDURE — 6360000002 HC RX W HCPCS: Performed by: STUDENT IN AN ORGANIZED HEALTH CARE EDUCATION/TRAINING PROGRAM

## 2025-04-07 PROCEDURE — 87040 BLOOD CULTURE FOR BACTERIA: CPT

## 2025-04-07 PROCEDURE — 84145 PROCALCITONIN (PCT): CPT

## 2025-04-07 PROCEDURE — 6360000004 HC RX CONTRAST MEDICATION: Performed by: STUDENT IN AN ORGANIZED HEALTH CARE EDUCATION/TRAINING PROGRAM

## 2025-04-07 PROCEDURE — 1100000003 HC PRIVATE W/ TELEMETRY

## 2025-04-07 PROCEDURE — 83735 ASSAY OF MAGNESIUM: CPT

## 2025-04-07 PROCEDURE — 87636 SARSCOV2 & INF A&B AMP PRB: CPT

## 2025-04-07 PROCEDURE — 80053 COMPREHEN METABOLIC PANEL: CPT

## 2025-04-07 PROCEDURE — 84132 ASSAY OF SERUM POTASSIUM: CPT

## 2025-04-07 PROCEDURE — 84484 ASSAY OF TROPONIN QUANT: CPT

## 2025-04-07 PROCEDURE — 2500000003 HC RX 250 WO HCPCS: Performed by: INTERNAL MEDICINE

## 2025-04-07 PROCEDURE — 6370000000 HC RX 637 (ALT 250 FOR IP): Performed by: INTERNAL MEDICINE

## 2025-04-07 PROCEDURE — 96375 TX/PRO/DX INJ NEW DRUG ADDON: CPT

## 2025-04-07 PROCEDURE — 93005 ELECTROCARDIOGRAM TRACING: CPT | Performed by: STUDENT IN AN ORGANIZED HEALTH CARE EDUCATION/TRAINING PROGRAM

## 2025-04-07 PROCEDURE — 82803 BLOOD GASES ANY COMBINATION: CPT

## 2025-04-07 PROCEDURE — 71275 CT ANGIOGRAPHY CHEST: CPT

## 2025-04-07 PROCEDURE — 2500000003 HC RX 250 WO HCPCS: Performed by: STUDENT IN AN ORGANIZED HEALTH CARE EDUCATION/TRAINING PROGRAM

## 2025-04-07 PROCEDURE — 84295 ASSAY OF SERUM SODIUM: CPT

## 2025-04-07 PROCEDURE — 96374 THER/PROPH/DIAG INJ IV PUSH: CPT

## 2025-04-07 PROCEDURE — 2580000003 HC RX 258: Performed by: STUDENT IN AN ORGANIZED HEALTH CARE EDUCATION/TRAINING PROGRAM

## 2025-04-07 PROCEDURE — 85025 COMPLETE CBC W/AUTO DIFF WBC: CPT

## 2025-04-07 PROCEDURE — 36415 COLL VENOUS BLD VENIPUNCTURE: CPT

## 2025-04-07 RX ORDER — SODIUM CHLORIDE 0.9 % (FLUSH) 0.9 %
5-40 SYRINGE (ML) INJECTION PRN
Status: DISCONTINUED | OUTPATIENT
Start: 2025-04-07 | End: 2025-04-20 | Stop reason: HOSPADM

## 2025-04-07 RX ORDER — SOLIFENACIN SUCCINATE 10 MG/1
10 TABLET, FILM COATED ORAL DAILY
COMMUNITY

## 2025-04-07 RX ORDER — ONDANSETRON 4 MG/1
4 TABLET, ORALLY DISINTEGRATING ORAL EVERY 8 HOURS PRN
Status: DISCONTINUED | OUTPATIENT
Start: 2025-04-07 | End: 2025-04-20 | Stop reason: HOSPADM

## 2025-04-07 RX ORDER — FINASTERIDE 5 MG/1
5 TABLET, FILM COATED ORAL DAILY
Status: DISCONTINUED | OUTPATIENT
Start: 2025-04-08 | End: 2025-04-20 | Stop reason: HOSPADM

## 2025-04-07 RX ORDER — CARBIDOPA AND LEVODOPA 25; 100 MG/1; MG/1
1 TABLET, EXTENDED RELEASE ORAL 2 TIMES DAILY
Status: DISCONTINUED | OUTPATIENT
Start: 2025-04-08 | End: 2025-04-20 | Stop reason: HOSPADM

## 2025-04-07 RX ORDER — ACETAMINOPHEN 650 MG/1
650 SUPPOSITORY RECTAL EVERY 6 HOURS PRN
Status: DISCONTINUED | OUTPATIENT
Start: 2025-04-07 | End: 2025-04-20 | Stop reason: HOSPADM

## 2025-04-07 RX ORDER — SODIUM CHLORIDE 9 MG/ML
INJECTION, SOLUTION INTRAVENOUS PRN
Status: DISCONTINUED | OUTPATIENT
Start: 2025-04-07 | End: 2025-04-20 | Stop reason: HOSPADM

## 2025-04-07 RX ORDER — MEMANTINE HYDROCHLORIDE 10 MG/1
5 TABLET ORAL 2 TIMES DAILY
Status: DISCONTINUED | OUTPATIENT
Start: 2025-04-08 | End: 2025-04-20 | Stop reason: HOSPADM

## 2025-04-07 RX ORDER — ONDANSETRON 2 MG/ML
4 INJECTION INTRAMUSCULAR; INTRAVENOUS EVERY 6 HOURS PRN
Status: DISCONTINUED | OUTPATIENT
Start: 2025-04-07 | End: 2025-04-20 | Stop reason: HOSPADM

## 2025-04-07 RX ORDER — TROSPIUM CHLORIDE ER 60 MG/1
60 CAPSULE ORAL DAILY
COMMUNITY

## 2025-04-07 RX ORDER — TAMSULOSIN HYDROCHLORIDE 0.4 MG/1
0.4 CAPSULE ORAL DAILY
Status: DISCONTINUED | OUTPATIENT
Start: 2025-04-08 | End: 2025-04-20 | Stop reason: HOSPADM

## 2025-04-07 RX ORDER — OXYBUTYNIN CHLORIDE 5 MG/1
5 TABLET, EXTENDED RELEASE ORAL EVERY EVENING
Status: DISCONTINUED | OUTPATIENT
Start: 2025-04-08 | End: 2025-04-10

## 2025-04-07 RX ORDER — POLYETHYLENE GLYCOL 3350 17 G/17G
17 POWDER, FOR SOLUTION ORAL DAILY
Status: DISCONTINUED | OUTPATIENT
Start: 2025-04-09 | End: 2025-04-20 | Stop reason: HOSPADM

## 2025-04-07 RX ORDER — ACETAMINOPHEN/DIPHENHYDRAMINE 500MG-25MG
2 TABLET ORAL NIGHTLY PRN
COMMUNITY

## 2025-04-07 RX ORDER — DONEPEZIL HYDROCHLORIDE 5 MG/1
10 TABLET, FILM COATED ORAL NIGHTLY
Status: DISCONTINUED | OUTPATIENT
Start: 2025-04-07 | End: 2025-04-20 | Stop reason: HOSPADM

## 2025-04-07 RX ORDER — ATORVASTATIN CALCIUM 20 MG/1
20 TABLET, FILM COATED ORAL NIGHTLY
Status: DISCONTINUED | OUTPATIENT
Start: 2025-04-08 | End: 2025-04-20 | Stop reason: HOSPADM

## 2025-04-07 RX ORDER — CLOBETASOL PROPIONATE 0.5 MG/G
OINTMENT TOPICAL 2 TIMES DAILY PRN
COMMUNITY

## 2025-04-07 RX ORDER — ACETAMINOPHEN 325 MG/1
650 TABLET ORAL EVERY 6 HOURS PRN
Status: DISCONTINUED | OUTPATIENT
Start: 2025-04-07 | End: 2025-04-20 | Stop reason: HOSPADM

## 2025-04-07 RX ORDER — IOPAMIDOL 755 MG/ML
100 INJECTION, SOLUTION INTRAVASCULAR
Status: COMPLETED | OUTPATIENT
Start: 2025-04-07 | End: 2025-04-07

## 2025-04-07 RX ORDER — BISACODYL 10 MG
10 SUPPOSITORY, RECTAL RECTAL DAILY PRN
Status: DISCONTINUED | OUTPATIENT
Start: 2025-04-07 | End: 2025-04-20 | Stop reason: HOSPADM

## 2025-04-07 RX ORDER — ASPIRIN 81 MG/1
81 TABLET ORAL
Status: DISCONTINUED | OUTPATIENT
Start: 2025-04-07 | End: 2025-04-19

## 2025-04-07 RX ORDER — ENOXAPARIN SODIUM 100 MG/ML
40 INJECTION SUBCUTANEOUS DAILY
Status: DISCONTINUED | OUTPATIENT
Start: 2025-04-09 | End: 2025-04-15

## 2025-04-07 RX ORDER — SODIUM CHLORIDE 0.9 % (FLUSH) 0.9 %
5-40 SYRINGE (ML) INJECTION EVERY 12 HOURS SCHEDULED
Status: DISCONTINUED | OUTPATIENT
Start: 2025-04-07 | End: 2025-04-20 | Stop reason: HOSPADM

## 2025-04-07 RX ORDER — HALOPERIDOL 5 MG/ML
2 INJECTION INTRAMUSCULAR EVERY 6 HOURS PRN
Status: DISCONTINUED | OUTPATIENT
Start: 2025-04-07 | End: 2025-04-20 | Stop reason: HOSPADM

## 2025-04-07 RX ORDER — 0.9 % SODIUM CHLORIDE 0.9 %
500 INTRAVENOUS SOLUTION INTRAVENOUS ONCE
Status: COMPLETED | OUTPATIENT
Start: 2025-04-07 | End: 2025-04-07

## 2025-04-07 RX ORDER — AMLODIPINE BESYLATE 5 MG/1
5 TABLET ORAL
Status: DISCONTINUED | OUTPATIENT
Start: 2025-04-07 | End: 2025-04-19

## 2025-04-07 RX ORDER — TROSPIUM CHLORIDE 20 MG/1
20 TABLET, FILM COATED ORAL NIGHTLY
Status: DISCONTINUED | OUTPATIENT
Start: 2025-04-08 | End: 2025-04-20 | Stop reason: HOSPADM

## 2025-04-07 RX ADMIN — WATER 1000 MG: 1 INJECTION INTRAMUSCULAR; INTRAVENOUS; SUBCUTANEOUS at 15:21

## 2025-04-07 RX ADMIN — MELATONIN 6 MG: at 23:02

## 2025-04-07 RX ADMIN — IOPAMIDOL 100 ML: 755 INJECTION, SOLUTION INTRAVENOUS at 14:35

## 2025-04-07 RX ADMIN — SODIUM CHLORIDE, PRESERVATIVE FREE 10 ML: 5 INJECTION INTRAVENOUS at 23:04

## 2025-04-07 RX ADMIN — DONEPEZIL HYDROCHLORIDE 10 MG: 5 TABLET, FILM COATED ORAL at 23:02

## 2025-04-07 RX ADMIN — SODIUM CHLORIDE 500 ML: 0.9 INJECTION, SOLUTION INTRAVENOUS at 16:03

## 2025-04-07 RX ADMIN — AMLODIPINE BESYLATE 5 MG: 5 TABLET ORAL at 23:02

## 2025-04-07 RX ADMIN — ASPIRIN 81 MG: 81 TABLET, COATED ORAL at 23:02

## 2025-04-07 RX ADMIN — AZITHROMYCIN MONOHYDRATE 500 MG: 500 INJECTION, POWDER, LYOPHILIZED, FOR SOLUTION INTRAVENOUS at 15:49

## 2025-04-07 ASSESSMENT — PAIN SCALES - GENERAL
PAINLEVEL_OUTOF10: 0

## 2025-04-07 NOTE — H&P
Hospitalist Admission Note    NAME:   Jonatan Rausch Jr.   : 1945   MRN: [id number]     Date/Time: 2025 4:37 PM    Patient PCP: Kenny Barksdale MD    ______________________________________________________________________  Given the patient's current clinical presentation, I have a high level of concern for decompensation if discharged from the emergency department.  Complex decision making was performed, which includes reviewing the patient's available past medical records, laboratory results, and x-ray films.       My assessment of this patient's clinical condition and my plan of care is as follows.    Assessment / Plan:    Recurrent dysphagia x weeks POA  Known parkinson's disease  Prior dysphagia admit 2024  CT abdomen no acute bowel pathology, cholelithiasis without gallbladder inflammation   Seen by Dr Leung   Barium swallow IMPRESSION:  No evidence of esophageal stricture or significant esophageal dysmotility.  No plans for further inpatient endoscopic eval  ? esophageal dilation on outpatient basis if dysphagia persists  Wife reports it improved  Now over the past few weeks, trouble swallowing   Sensation of mainly food getting stuck in chest    Patient reports some pain in swallowing, wife was not aware   No nausea vomiting or diarrhea   No abdominal pain  Past week essentially not eating much  Overnight began to develop increasing shortness of breath and labored breathing  Wife brought him to ED  Etiology is dysphagia unclear  Clear liquids for now, n.p.o. after midnight  Reconsult gastrointestinal specialist, ?  Needs EGD  IV PPI    Acute respiratory distress POA  ?  Multifocal groundglass opacity, ?  Pneumonia versus aspiration injury.  SIRS POA WBC 14.5, , RR 33  Developed increasing respiratory distress and congestion overnight  Rapid PCR for COVID-19 and influenza A/B negative  Procalcitonin < 0.05  CTA chest IMPRESSION:  1.  No evidence of pulmonary embolism.  2.  Prominent

## 2025-04-07 NOTE — TELEPHONE ENCOUNTER
Message  Received: Today  Yoel Quesada, KELLY - Maryse Bernabe, LPN  Caller: Unspecified (3 days ago,  8:36 AM)  Maryse,    No, I did not order any wrist or knee braces.    Thank you,  Dipak        Called Avril on PHI, advised Yoel did not order any wrist or knee braces. Advised to check with PCP maybe or maybe it could be a mix up with another person the same name.  Avril verbalized understanding.

## 2025-04-07 NOTE — ED NOTES
TRANSFER - OUT REPORT:    Verbal report given to [place] on Jonatan Rausch Jr.  being transferred to [facility] for routine progression of patient care       Report consisted of patient's Situation, Background, Assessment and   Recommendations(SBAR).     Information from the following report(s) Nurse Handoff Report, Index, ED Encounter Summary, ED SBAR, Surgery Report, Intake/Output, MAR, Recent Results, Med Rec Status, Cardiac Rhythm sinus tach, Neuro Assessment, and Event Log was reviewed with the receiving nurse.    [place] Fall Assessment:    Presents to emergency department  because of falls (Syncope, seizure, or loss of consciousness): Yes  Age > 70: Yes  Altered Mental Status, Intoxication with alcohol or substance confusion (Disorientation, impaired judgment, poor safety awaremess, or inability to follow instructions): Yes  Impaired Mobility: Ambulates or transfers with assistive devices or assistance; Unable to ambulate or transer.: Yes  Nursing Judgement: Yes          Lines:   Peripheral IV 04/07/25 Left Antecubital (Active)        Opportunity for questions and clarification was provided.      Patient transported with:  Transport w/ tele

## 2025-04-07 NOTE — PROGRESS NOTES
Pharmacy Medication History Review    Medication history obtained by Li Mccarthy while patient was in room ER17/17 and was completed based on information available during current patient encounter. Medication history was completed before home meds were reconciled by provider.      PTA medication list was corrected to the following:   Prior to Admission Medications   Prescriptions Last Dose Informant   amLODIPine (NORVASC) 5 MG tablet 4/6/2025 Evening Other, Spouse/Significant Other   Sig: Take 1 tablet by mouth nightly   aspirin 81 MG EC tablet 4/6/2025 Evening Self, Spouse/Significant Other   Sig: Take 1 tablet by mouth nightly   carbidopa-levodopa (SINEMET CR)  MG per extended release tablet 4/7/2025 Morning Spouse/Significant Other   Sig: Take 1 tablet by mouth 2 times daily   clobetasol (TEMOVATE) 0.05 % ointment Unknown Spouse/Significant Other   Sig: Apply topically 2 times daily as needed   diphenhydrAMINE-APAP, sleep, (TYLENOL PM EXTRA STRENGTH)  MG tablet 4/6/2025 Bedtime Spouse/Significant Other   Sig: Take 2 tablets by mouth nightly as needed for Sleep   donepezil (ARICEPT) 10 MG tablet 4/6/2025 Bedtime Spouse/Significant Other   Sig: Take 1 tablet by mouth nightly   finasteride (PROSCAR) 5 MG tablet 4/7/2025 Morning Other, Spouse/Significant Other   Sig: Take 1 tablet by mouth daily   meloxicam (MOBIC) 7.5 MG tablet 4/7/2025 Morning Other, Spouse/Significant Other   Sig: Take 1 tablet by mouth in the morning and at bedtime   memantine (NAMENDA) 5 MG tablet 4/7/2025 Morning Spouse/Significant Other   Sig: Take 1 tablet daily.  Increase to 1 tablet twice a day after 2 weeks.   Patient taking differently: Take 1 tablet by mouth 2 times daily   oxyBUTYnin (DITROPAN-XL) 5 MG extended release tablet Unknown Other, Spouse/Significant Other   Sig: Take 1 tablet by mouth every evening   sertraline (ZOLOFT) 50 MG tablet 4/7/2025 Morning Spouse/Significant Other   Sig: Take 1 tablet by mouth daily

## 2025-04-07 NOTE — ED PROVIDER NOTES
MRM 3 [facility] TELE  EMERGENCY DEPARTMENT ENCOUNTER       Pt Name: Jonatan Rausch Jr.  MRN: [id number]  Birthdate 1945  Date of evaluation: 4/7/2025  Provider: Jesse Ford MD   PCP: Kenny Barksdale MD  Note Started: 1:20 PM EDT 4/7/25     CHIEF COMPLAINT       Chief Complaint   Patient presents with    Shortness of Breath     Pt ambulatory with cane to triage, reports SOB ongoing x many years, worsening last night. Pt's wife report that his work of breathing significantly increased last night and she was concerned he would pass out. Patient hx of dementia, cardiac stents, parkinsons.         HISTORY OF PRESENT ILLNESS: 1 or more elements      History From: patient, History limited by: Dementia     Jonatan Rausch Jr. is a 80 y.o. male presenting with shortness of breath.  Presents with his wife who is an additional history taker as patient has a history of dementia.  Shortness of breath started last night while he was in the shower and is very dyspneic on exertion.  Progressed throughout this morning and has tachypnea.  Patient denies any complaints currently.  Denies any shortness of breath, chest pain, abdominal pain, nausea or vomiting.  Wife also notes that he has had some dysphagia for the past 2 weeks.  Has had this happen about 1 year ago.  Has had decreased intake because it hurts when he swallows.       Please See MDM for Additional Details of the HPI/PMH  Nursing Notes were all reviewed and agreed with or any disagreements were addressed in the HPI.     REVIEW OF SYSTEMS        Positives and Pertinent negatives as per HPI.    PAST HISTORY     Past Medical History:  Past Medical History:   Diagnosis Date    CAD (coronary artery disease)     Hypertension     Sleep apnea        Past Surgical History:  Past Surgical History:   Procedure Laterality Date    ILIO-FEMORAL BYPASS GRAFT      over 12 yrs ago - not sure of date    TOTAL KNEE ARTHROPLASTY Bilateral        Family History:  No family history on  MG tablet Take 1 tablet by mouth in the morning and at bedtime      finasteride (PROSCAR) 5 MG tablet Take 1 tablet by mouth daily      clobetasol (TEMOVATE) 0.05 % ointment Apply topically 2 times daily as needed      oxyBUTYnin (DITROPAN-XL) 5 MG extended release tablet Take 1 tablet by mouth every evening             SCREENINGS               No data recorded            PHYSICAL EXAM      ED Triage Vitals [04/07/25 1245]   Encounter Vitals Group      BP (!) 144/71      Systolic BP Percentile       Diastolic BP Percentile       Pulse (!) 113      Respirations (!) 33      Temp 98.2 °F (36.8 °C)      Temp Source Oral      SpO2 94 %      Weight - Scale 87.3 kg (192 lb 7.4 oz)      Height 1.676 m (5' 6\")      Head Circumference       Peak Flow       Pain Score       Pain Loc       Pain Education       Exclude from Growth Chart         Physical Exam  Vitals and nursing note reviewed.   Constitutional:       Appearance: Normal appearance.   HENT:      Head: Normocephalic.   Eyes:      Extraocular Movements: Extraocular movements intact.      Pupils: Pupils are equal, round, and reactive to light.   Cardiovascular:      Rate and Rhythm: Regular rhythm. Tachycardia present.   Pulmonary:      Effort: Pulmonary effort is normal. Tachypnea present.   Chest:      Chest wall: No mass or tenderness.   Abdominal:      General: Abdomen is flat.      Palpations: Abdomen is soft.      Tenderness: There is no abdominal tenderness.   Musculoskeletal:         General: Normal range of motion.      Cervical back: Normal range of motion.   Skin:     General: Skin is warm and dry.   Neurological:      General: No focal deficit present.      Mental Status: He is alert.   Psychiatric:         Mood and Affect: Mood normal.         Behavior: Behavior normal.          DIAGNOSTIC RESULTS   LABS:     Recent Results (from the past 24 hours)   EKG 12 Lead    Collection Time: 04/07/25 12:50 PM   Result Value Ref Range    Ventricular Rate 123 BPM

## 2025-04-08 LAB
ANION GAP BLD CALC-SCNC: 12 (ref 10–20)
BASE DEFICIT BLD-SCNC: 4.2 MMOL/L
BASOPHILS # BLD: 0.04 K/UL (ref 0–0.1)
BASOPHILS NFR BLD: 0.3 % (ref 0–1)
CA-I BLD-MCNC: 1.22 MMOL/L (ref 1.15–1.33)
CHLORIDE BLD-SCNC: 98 MMOL/L (ref 100–111)
CO2 BLD-SCNC: 19 MMOL/L (ref 22–29)
CREAT UR-MCNC: 1 MG/DL (ref 0.6–1.3)
DIFFERENTIAL METHOD BLD: ABNORMAL
EOSINOPHIL # BLD: 0.04 K/UL (ref 0–0.4)
EOSINOPHIL NFR BLD: 0.3 % (ref 0–7)
ERYTHROCYTE [DISTWIDTH] IN BLOOD BY AUTOMATED COUNT: 16.2 % (ref 11.5–14.5)
HCO3 BLDA-SCNC: 20 MMOL/L
HCT VFR BLD AUTO: 30.6 % (ref 36.6–50.3)
HGB BLD-MCNC: 9.7 G/DL (ref 12.1–17)
IMM GRANULOCYTES # BLD AUTO: 0.08 K/UL (ref 0–0.04)
IMM GRANULOCYTES NFR BLD AUTO: 0.6 % (ref 0–0.5)
LACTATE BLD-SCNC: 1.11 MMOL/L (ref 0.4–2)
LYMPHOCYTES # BLD: 0.74 K/UL (ref 0.8–3.5)
LYMPHOCYTES NFR BLD: 6 % (ref 12–49)
MCH RBC QN AUTO: 26.8 PG (ref 26–34)
MCHC RBC AUTO-ENTMCNC: 31.7 G/DL (ref 30–36.5)
MCV RBC AUTO: 84.5 FL (ref 80–99)
MONOCYTES # BLD: 0.97 K/UL (ref 0–1)
MONOCYTES NFR BLD: 7.9 % (ref 5–13)
NEUTS SEG # BLD: 10.45 K/UL (ref 1.8–8)
NEUTS SEG NFR BLD: 84.9 % (ref 32–75)
NRBC # BLD: 0 K/UL (ref 0–0.01)
NRBC BLD-RTO: 0 PER 100 WBC
PCO2 BLDV: 32.4 MMHG (ref 41–51)
PH BLDV: 7.4 (ref 7.32–7.42)
PLATELET # BLD AUTO: 320 K/UL (ref 150–400)
PMV BLD AUTO: 10.7 FL (ref 8.9–12.9)
PO2 BLDV: 49 MMHG (ref 25–40)
POTASSIUM BLD-SCNC: 4 MMOL/L (ref 3.5–5.5)
RBC # BLD AUTO: 3.62 M/UL (ref 4.1–5.7)
SAO2 % BLD: 84 % (ref 94–98)
SODIUM BLD-SCNC: 129 MMOL/L (ref 136–145)
SPECIMEN SITE: ABNORMAL
WBC # BLD AUTO: 12.3 K/UL (ref 4.1–11.1)

## 2025-04-08 PROCEDURE — 2500000003 HC RX 250 WO HCPCS: Performed by: INTERNAL MEDICINE

## 2025-04-08 PROCEDURE — 97165 OT EVAL LOW COMPLEX 30 MIN: CPT

## 2025-04-08 PROCEDURE — 97530 THERAPEUTIC ACTIVITIES: CPT

## 2025-04-08 PROCEDURE — 85025 COMPLETE CBC W/AUTO DIFF WBC: CPT

## 2025-04-08 PROCEDURE — 6370000000 HC RX 637 (ALT 250 FOR IP): Performed by: INTERNAL MEDICINE

## 2025-04-08 PROCEDURE — 36415 COLL VENOUS BLD VENIPUNCTURE: CPT

## 2025-04-08 PROCEDURE — 6360000002 HC RX W HCPCS: Performed by: INTERNAL MEDICINE

## 2025-04-08 PROCEDURE — 97116 GAIT TRAINING THERAPY: CPT

## 2025-04-08 PROCEDURE — 2580000003 HC RX 258: Performed by: INTERNAL MEDICINE

## 2025-04-08 PROCEDURE — 97535 SELF CARE MNGMENT TRAINING: CPT

## 2025-04-08 PROCEDURE — 97161 PT EVAL LOW COMPLEX 20 MIN: CPT

## 2025-04-08 PROCEDURE — 1100000003 HC PRIVATE W/ TELEMETRY

## 2025-04-08 RX ORDER — SODIUM CHLORIDE 9 MG/ML
INJECTION, SOLUTION INTRAVENOUS CONTINUOUS
Status: DISCONTINUED | OUTPATIENT
Start: 2025-04-08 | End: 2025-04-11

## 2025-04-08 RX ADMIN — SODIUM CHLORIDE: 0.9 INJECTION, SOLUTION INTRAVENOUS at 18:50

## 2025-04-08 RX ADMIN — TROSPIUM CHLORIDE 20 MG: 20 TABLET, FILM COATED ORAL at 21:31

## 2025-04-08 RX ADMIN — OXYBUTYNIN CHLORIDE 5 MG: 5 TABLET, EXTENDED RELEASE ORAL at 17:32

## 2025-04-08 RX ADMIN — FINASTERIDE 5 MG: 5 TABLET, FILM COATED ORAL at 08:58

## 2025-04-08 RX ADMIN — DONEPEZIL HYDROCHLORIDE 10 MG: 5 TABLET, FILM COATED ORAL at 21:30

## 2025-04-08 RX ADMIN — TAMSULOSIN HYDROCHLORIDE 0.4 MG: 0.4 CAPSULE ORAL at 08:58

## 2025-04-08 RX ADMIN — AZITHROMYCIN MONOHYDRATE 500 MG: 500 INJECTION, POWDER, LYOPHILIZED, FOR SOLUTION INTRAVENOUS at 17:55

## 2025-04-08 RX ADMIN — ATORVASTATIN CALCIUM 20 MG: 20 TABLET, FILM COATED ORAL at 21:30

## 2025-04-08 RX ADMIN — PANTOPRAZOLE SODIUM 40 MG: 40 INJECTION, POWDER, LYOPHILIZED, FOR SOLUTION INTRAVENOUS at 08:57

## 2025-04-08 RX ADMIN — MEMANTINE 5 MG: 10 TABLET ORAL at 08:58

## 2025-04-08 RX ADMIN — WATER 1000 MG: 1 INJECTION INTRAMUSCULAR; INTRAVENOUS; SUBCUTANEOUS at 17:47

## 2025-04-08 RX ADMIN — ASPIRIN 81 MG: 81 TABLET, COATED ORAL at 21:30

## 2025-04-08 RX ADMIN — SODIUM CHLORIDE, PRESERVATIVE FREE 10 ML: 5 INJECTION INTRAVENOUS at 21:31

## 2025-04-08 RX ADMIN — AMLODIPINE BESYLATE 5 MG: 5 TABLET ORAL at 21:31

## 2025-04-08 RX ADMIN — MELATONIN 6 MG: at 21:30

## 2025-04-08 RX ADMIN — MEMANTINE 5 MG: 10 TABLET ORAL at 21:31

## 2025-04-08 RX ADMIN — CARBIDOPA AND LEVODOPA 1 TABLET: 25; 100 TABLET, EXTENDED RELEASE ORAL at 08:58

## 2025-04-08 RX ADMIN — SODIUM CHLORIDE, PRESERVATIVE FREE 10 ML: 5 INJECTION INTRAVENOUS at 08:58

## 2025-04-08 RX ADMIN — SERTRALINE 50 MG: 50 TABLET, FILM COATED ORAL at 08:58

## 2025-04-08 RX ADMIN — SODIUM CHLORIDE: 0.9 INJECTION, SOLUTION INTRAVENOUS at 12:51

## 2025-04-08 RX ADMIN — CARBIDOPA AND LEVODOPA 1 TABLET: 25; 100 TABLET, EXTENDED RELEASE ORAL at 21:31

## 2025-04-08 ASSESSMENT — PAIN SCALES - GENERAL: PAINLEVEL_OUTOF10: 0

## 2025-04-08 NOTE — PROGRESS NOTES
Hospitalist Progress Note               Daily Progress Note: 4/8/2025      Hospital Day: 2     Chief complaint:   Chief Complaint   Patient presents with    Shortness of Breath     Pt ambulatory with cane to triage, reports SOB ongoing x many years, worsening last night. Pt's wife report that his work of breathing significantly increased last night and she was concerned he would pass out. Patient hx of dementia, cardiac stents, parkinsons.         Subjective:   Hospital course to date:  Pleasant 80-year-old gentleman with history of Parkinson's disease complicated by dysphagia, dementia, essential hypertension, coronary artery disease status post CABG admitted with reports of increasing difficulty swallowing with possible aspiration.    --------  Patient is seen today for follow-up.  Resting comfortably.  Pleasantly confused.  No fevers overnight.        Medications reviewed  Current Facility-Administered Medications   Medication Dose Route Frequency    sodium chloride flush 0.9 % injection 5-40 mL  5-40 mL IntraVENous 2 times per day    sodium chloride flush 0.9 % injection 5-40 mL  5-40 mL IntraVENous PRN    0.9 % sodium chloride infusion   IntraVENous PRN    [START ON 4/9/2025] enoxaparin (LOVENOX) injection 40 mg  40 mg SubCUTAneous Daily    ondansetron (ZOFRAN-ODT) disintegrating tablet 4 mg  4 mg Oral Q8H PRN    Or    ondansetron (ZOFRAN) injection 4 mg  4 mg IntraVENous Q6H PRN    [START ON 4/9/2025] polyethylene glycol (GLYCOLAX) packet 17 g  17 g Oral Daily    bisacodyl (DULCOLAX) suppository 10 mg  10 mg Rectal Daily PRN    acetaminophen (TYLENOL) tablet 650 mg  650 mg Oral Q6H PRN    Or    acetaminophen (TYLENOL) suppository 650 mg  650 mg Rectal Q6H PRN    amLODIPine (NORVASC) tablet 5 mg  5 mg Oral QHS    aspirin EC tablet 81 mg  81 mg Oral QHS    carbidopa-levodopa (SINEMET CR)  MG per extended release tablet 1 tablet  1 tablet Oral BID    donepezil (ARICEPT) tablet 10 mg  10 mg Oral Nightly  Days:  Recent Labs     04/07/25  1300 04/08/25  0456   WBC 14.5* 12.3*   HGB 10.9* 9.7*   HCT 34.4* 30.6*    320     Recent Labs     04/07/25  1300 04/07/25  1517   *  --    K 4.3  --    CL 99  --    CO2 23  --    GLUCOSE 130*  --    BUN 24*  --    CREATININE 1.17 1.0   CALCIUM 9.2  --    MG 1.9  --    BILITOT 0.7  --    AST 14*  --    ALT 8*  --      Recent Labs     04/07/25  1517   [id number] 20       24 Hour Results:  Recent Results (from the past 24 hours)   EKG 12 Lead    Collection Time: 04/07/25 12:50 PM   Result Value Ref Range    Ventricular Rate 123 BPM    Atrial Rate 123 BPM    P-R Interval 188 ms    QRS Duration 108 ms    Q-T Interval 310 ms    QTc Calculation (Bazett) 443 ms    P Axis 26 degrees    R Axis -11 degrees    T Axis 153 degrees    Diagnosis       Sinus tachycardia with occasional premature ventricular complexes  Possible Left atrial enlargement  Minimal voltage criteria for LVH, may be normal variant ( [place] product )  ST & T wave abnormality, consider inferolateral ischemia  Abnormal ECG  When compared with ECG of 28-APR-2024 14:59,  premature ventricular complexes are now present  T wave inversion now evident in Inferior leads     CBC with Auto Differential    Collection Time: 04/07/25  1:00 PM   Result Value Ref Range    WBC 14.5 (H) 4.1 - 11.1 K/uL    RBC 4.10 4.10 - 5.70 M/uL    Hemoglobin 10.9 (L) 12.1 - 17.0 g/dL    Hematocrit 34.4 (L) 36.6 - 50.3 %    MCV 83.9 80.0 - 99.0 FL    MCH 26.6 26.0 - 34.0 PG    MCHC 31.7 30.0 - 36.5 g/dL    RDW 16.0 (H) 11.5 - 14.5 %    Platelets 362 150 - 400 K/uL    MPV 10.8 8.9 - 12.9 FL    Nucleated RBCs 0.0 0  WBC    nRBC 0.00 0.00 - 0.01 K/uL    Neutrophils % 90.6 (H) 32.0 - 75.0 %    Lymphocytes % 3.7 (L) 12.0 - 49.0 %    Monocytes % 4.9 (L) 5.0 - 13.0 %    Eosinophils % 0.1 0.0 - 7.0 %    Basophils % 0.2 0.0 - 1.0 %    Immature Granulocytes % 0.5 0.0 - 0.5 %    Neutrophils Absolute 13.14 (H) 1.80 - 8.00 K/UL    Lymphocytes Absolute

## 2025-04-08 NOTE — PROGRESS NOTES
End of Shift Note    Bedside shift change report given to BAR Milligan (oncoming nurse) by Chriss Biswas RN (offgoing nurse).  Report included the following information SBAR, ED Summary, and Cardiac Rhythm NST    Shift worked:  Day     Shift summary and any significant changes:     1852: received patient from Ed Alert and oriented X 3, RA, Vitals are stable.  Bath given. Skin assessment done with Night nurse. Head to toe assessment done but not able to finish admission database because of confusion.  Urine analysis pending to send sample to lab, notified Night nurse.  Pt resting comfortably in room. plan of care on  going.     Concerns for physician to address:       Zone phone for oncoming shift:          Activity:  Level of Assistance: Minimal assist, patient does 75% or more  Number times ambulated in hallways past shift: 0  Number of times OOB to chair past shift: 0    Cardiac:   Cardiac Monitoring: Yes      Cardiac Rhythm: Sinus tachy    Access:  Current line(s): PIV     Genitourinary:   Urinary Status: Voiding, Bathroom privileges (Urinal)    Respiratory:   O2 Device: None (Room air)  Chronic home O2 use?: NO  Incentive spirometer at bedside: NO    GI:     Current diet:  No diet orders on file  Passing flatus: YES    Pain Management:   Patient states pain is manageable on current regimen: YES    Skin:  Krishna Scale Score: 18  Interventions: Wound Offloading (Prevention Methods): Turning, Repositioning, Pillows    Patient Safety:  Fall Risk: Nursing Judgement-Fall Risk High(Add Comments): Yes  Fall Risk Interventions  Nursing Judgement-Fall Risk High(Add Comments): Yes  Toilet Every 2 Hours-In Advance of Need: Yes  Hourly Visual Checks: Awake, In bed  Fall Visual Posted: Fall sign posted  Room Door Open: Deferred to promote rest  Alarm On: Bed  Patient Moved Closer to Nursing Station: No    Active Consults:   None    Length of Stay:  Expected LOS: 5  Actual LOS: 0    Chriss Biswas RN

## 2025-04-08 NOTE — PLAN OF CARE
Problem: Physical Therapy - Adult  Goal: By Discharge: Performs mobility at highest level of function for planned discharge setting.  See evaluation for individualized goals.  Description: FUNCTIONAL STATUS PRIOR TO ADMISSION: Pt ambulates independently with use of SBQC. Endorses h/o falls. Stopped driving Nov 2024. Pt lives with wife who provides support as needed. PMH includes dementia and Parkinson's disease.     Physical Therapy Goals  Initiated 4/8/2025  1.  Patient will move from supine to sit and sit to supine in bed with modified independence within 7 day(s).    2.  Patient will perform sit to stand with modified independence within 7 day(s).  3.  Patient will transfer from bed to chair and chair to bed with supervision/set-up using the least restrictive device within 7 day(s).  4.  Patient will ambulate with supervision/set-up for 100 feet with the least restrictive device within 7 day(s).   5.  Patient will ascend/descend 3 stairs with QC and minimal assistance within 7 day(s).   Outcome: Progressing   PHYSICAL THERAPY EVALUATION    Patient: Jonatan Rausch JrPolly (80 y.o. male)  Date: 4/8/2025  Primary Diagnosis: Sepsis (HCC) [A41.9]  Community acquired pneumonia, unspecified laterality [J18.9]       Precautions: Restrictions/Precautions  Restrictions/Precautions: Fall Risk            ASSESSMENT :   DEFICITS/IMPAIRMENTS:   The patient is limited by decreased functional mobility, strength, activity tolerance, safety awareness, cognition, balance following admit with recurrent dysphagia, SOB, pleural effusion, possible PNA, sepsis.     Pt received with 2L O2 in place, dyspneic at rest with shallow breathing noted. Able to wean O2 to 1L with SpO2 96% at rest, however pt demo desat to SpO2 79-80% with activity on RA requiring cues for PLB and 2L O2 to recover. Noted improved O2 sats with pt in upright position.     Pt tolerated standing activity related to brief change with SBA/ CGA for balance and intermittent  fall

## 2025-04-08 NOTE — PROGRESS NOTES
Physician Progress Note      PATIENT:               DWIGHT HOLLAND  CSN #:                  [id number]  :                       1945  ADMIT DATE:       2025 12:35 PM  DISCH DATE:  RESPONDING  PROVIDER #:        Josh Zamudio MD          QUERY TEXT:    Acute respiratory failure is documented in the medical record IM PN on 25.   Please provide additional clinical indicators supportive of your   documentation. Or please document if the diagnosis of acute respiratory   failure has been ruled out after study.    The clinical indicators include:  - admitted with multifocal pneumonia with concern for aspiration and recurrent   dysphagia  -  H&P notes acute respiratory distress and notes \"ED tachypneic but did   not require oxygen\"  -  IM PN with acute hypoxic respiratory failure  - sats >90 throughout admission per VS summary  - sats 94 on RA on arrival to ED w/ RR 33 per VS summary  Options provided:  -- Acute Respiratory Failure as evidenced by, Please document evidence.  -- Acute Respiratory Failure ruled out after study  -- Other - I will add my own diagnosis  -- Disagree - Not applicable / Not valid  -- Disagree - Clinically unable to determine / Unknown  -- Refer to Clinical Documentation Reviewer    PROVIDER RESPONSE TEXT:    Acute Respiratory Failure has been ruled out after study.    Query created by: Milly Garay on 2025 1:33 PM      Electronically signed by:  Josh Zamudio MD 2025 1:44 PM

## 2025-04-08 NOTE — CONSULTS
Good insight.  Not depressed.  Not anxious or agitated.  Neurologic:      EOMs intact. No facial asymmetry. No aphasia or slurred speech. A/O X 3.     LAB DATA REVIEWED:    Recent Results (from the past 24 hours)   Blood Culture 1    Collection Time: 04/07/25  3:12 PM    Specimen: Blood   Result Value Ref Range    Special Requests NO SPECIAL REQUESTS  LEFT  FOREARM        Culture NO GROWTH AFTER 16 HOURS     Blood Culture 2    Collection Time: 04/07/25  3:12 PM    Specimen: Blood   Result Value Ref Range    Special Requests NO SPECIAL REQUESTS  LEFT  Antecubital        Culture NO GROWTH AFTER 16 HOURS     Procalcitonin    Collection Time: 04/07/25  3:12 PM   Result Value Ref Range    Procalcitonin <0.05 ng/mL   Troponin    Collection Time: 04/07/25  3:12 PM   Result Value Ref Range    Troponin, High Sensitivity 30 0 - 76 ng/L   POCT Blood Gas & Electrolytes    Collection Time: 04/07/25  3:17 PM   Result Value Ref Range    PH, VENOUS (POC) 7.40 7.32 - 7.42      PCO2, Daria, POC 32.4 (L) 41 - 51 MMHG    PO2, VENOUS (POC) 49 (H) 25 - 40 mmHg    HCO3, Arterial 20 mmol/L    Base Deficit (POC) 4.2 mmol/L    POC O2 SAT 84 (L) 94 - 98 %    POC Sodium 129 (L) 136 - 145 MMOL/L    POC Potassium 4.0 3.5 - 5.5 MMOL/L    POC Chloride 98 (L) 100 - 111 MMOL/L    POC TCO2 19 (L) 22 - 29 MMOL/L    Anion Gap, POC 12 10 - 20      POC Creatinine 1.0 0.6 - 1.3 MG/DL    eGFR, POC 75 >60 ml/min/1.73m2    POC Ionized Calcium 1.22 1.15 - 1.33 mmol/L    POC Lactic Acid 1.11 0.40 - 2.00 mmol/L    Source VENOUS BLOOD     CBC with Auto Differential    Collection Time: 04/08/25  4:56 AM   Result Value Ref Range    WBC 12.3 (H) 4.1 - 11.1 K/uL    RBC 3.62 (L) 4.10 - 5.70 M/uL    Hemoglobin 9.7 (L) 12.1 - 17.0 g/dL    Hematocrit 30.6 (L) 36.6 - 50.3 %    MCV 84.5 80.0 - 99.0 FL    MCH 26.8 26.0 - 34.0 PG    MCHC 31.7 30.0 - 36.5 g/dL    RDW 16.2 (H) 11.5 - 14.5 %    Platelets 320 150 - 400 K/uL    MPV 10.7 8.9 - 12.9 FL    Nucleated RBCs 0.0 0 PER  100 WBC    nRBC 0.00 0.00 - 0.01 K/uL    Neutrophils % 84.9 (H) 32.0 - 75.0 %    Lymphocytes % 6.0 (L) 12.0 - 49.0 %    Monocytes % 7.9 5.0 - 13.0 %    Eosinophils % 0.3 0.0 - 7.0 %    Basophils % 0.3 0.0 - 1.0 %    Immature Granulocytes % 0.6 (H) 0.0 - 0.5 %    Neutrophils Absolute 10.45 (H) 1.80 - 8.00 K/UL    Lymphocytes Absolute 0.74 (L) 0.80 - 3.50 K/UL    Monocytes Absolute 0.97 0.00 - 1.00 K/UL    Eosinophils Absolute 0.04 0.00 - 0.40 K/UL    Basophils Absolute 0.04 0.00 - 0.10 K/UL    Immature Granulocytes Absolute 0.08 (H) 0.00 - 0.04 K/UL    Differential Type AUTOMATED         IMAGING RESULTS:  I have personally reviewed the imaging reports      Total time spent with patient: 30 minutes ________________________________________________________________________  Care Plan discussed with:  Patient Y   Family     RN               Consultant:       ________________________________________________________________________    ___________________________________________________  Consulting Provider: Diane Guardado PA-C      4/8/2025  2:39 PM

## 2025-04-08 NOTE — PLAN OF CARE
rails  Entrance Stairs - Number of Steps: 3  Bathroom Shower/Tub: Tub/Shower unit  Bathroom Equipment: Grab bars in shower  Home Equipment: Cane - Quad, Walker - Rolling  Has the patient had two or more falls in the past year or any fall with injury in the past year?: Yes  Prior Level of Assist for ADLs: Independent  Prior Level of Assist for Ambulation: Independent community ambulator, with or without device  Prior Level of Assist for Transfers: Independent  Active : No  Occupation: Retired      Hand Dominance: unknown     EXAMINATION OF PERFORMANCE DEFICITS:    Cognitive/Behavioral Status:  Orientation  Orientation Level: Oriented X4  Cognition  Overall Cognitive Status: Exceptions  Arousal/Alertness: Appears intact  Following Commands: Appears intact  Attention Span: Appears intact  Memory: Impaired  Safety Judgement: Impaired  Problem Solving: Impaired  Insights: Impaired  Initiation: Appears intact  Sequencing: Requires cues for some  Cognition Comment: h/o dementia, flat affect    Vision/Perceptual:    Vision - Basic Assessment  Patient Visual Report: No visual complaint reported.                     Range of Motion:   AROM: Generally decreased, functional         Strength:  Strength: Generally decreased, functional      Coordination:  Coordination: Generally decreased, functional     Coordination: Within functional limits      Tone & Sensation:   Tone: Normal  Sensation: Intact          Functional Mobility and Transfers for ADLs:    Bed Mobility:     Bed Mobility Training  Bed Mobility Training: Yes  Supine to Sit: Stand by assistance  Scooting: Stand by assistance    Transfers:      Transfer Training  Transfer Training: Yes  Sit to Stand: Stand by assistance  Stand to Sit: Stand by assistance                     Balance:      Balance  Standing: Impaired  Standing - Static: Good  Standing - Dynamic: Fair      ADL Assessment:          Feeding: Setup;Based on clinical judgement       Grooming: Contact  guard assistance  Grooming Skilled Clinical Factors: standing at sink    UE Bathing: Stand by assistance;Based on clinical judgement            LE Bathing: Contact guard assistance  Seated at EOB       UE Dressing: Minimal assistance;Based on clinical judgement       LE Dressing: Moderate assistance;Stand by assistance  LE Dressing Skilled Clinical Factors: SBA, seated EOB for sock doffing/donning; mod A for donning hospital brief and velcro side application portion    Toileting: Contact guard assistance         ADL Intervention and task modifications:    Pt educated on safe transfer techniques, with specific emphasis on proper hand placement to push up from seated surface rather than attempt to pull self up, fully positioning self in-front of desired seated location, feeling chair on back of legs and reaching back with 1-2 UE to slowly lower self to seated position.                                                                                                                                                                                                                                          Barthel Index:    Barthel Index Scale  Feeding: Independent, Able to apply any necessary device. Feeds in reasonable time  Bathing: Cannot perform activity  Grooming: Washes face, crook hair, brushes teeth, shaves (manages plug if electric razor)  Dressing: Independent, Ties shoes, fasteners, applies braces  Bowel Control: Occasional accidents or needs help with device  Bladder Control: Occasional accidents or needs help with device  Toilet Transfers: Needs help for balance, handling clothes or toilet paper  Chair/Bed Trannsfers: Minimum assistance or supervision required  Ambulation: With help for 50 yards  Stairs: Needs help or supervision  Total Barthel Index Score: 65       The Barthel ADL Index: Guidelines  1. The index should be used as a record of what a patient does, not as a record of what a patient could do.  2.

## 2025-04-08 NOTE — PROGRESS NOTES
End of Shift Note    Bedside shift change report given to Rachel DINERO (oncoming nurse) by Srini Mcdaniels RN (offgoing nurse).  Report included the following information SBAR, Intake/Output, MAR, Recent Results, Cardiac Rhythm Sinus Tachy, and Quality Measures    Shift worked:  [id number]     Shift summary and any significant changes:     Seen patient on bed, with dyspnea on exertion,Due medications given Hourly rounding done, Patient needs attended. No concern as of this time      Concerns for physician to address:  none     Zone phone for oncoming shift:   [phone]       Activity:  Level of Assistance: Minimal assist, patient does 75% or more  Number times ambulated in hallways past shift: 0  Number of times OOB to chair past shift: 2    Cardiac:   Cardiac Monitoring: Yes      Cardiac Rhythm: Sinus tachy    Access:  Current line(s): PIV     Genitourinary:   Urinary Status: Voiding, Bathroom privileges    Respiratory:   O2 Device: None (Room air)  Chronic home O2 use?: NO  Incentive spirometer at bedside: NO    GI:     Current diet:  Diet NPO Exceptions are: Sips of Water with Meds  Passing flatus: YES    Pain Management:   Patient states pain is manageable on current regimen: YES    Skin:  Krishna Scale Score: 18  Interventions: Wound Offloading (Prevention Methods): Turning, Repositioning, Pillows    Patient Safety:  Fall Risk: Nursing Judgement-Fall Risk High(Add Comments): Yes  Fall Risk Interventions  Nursing Judgement-Fall Risk High(Add Comments): Yes  Toilet Every 2 Hours-In Advance of Need: Yes  Hourly Visual Checks: In bed, Quiet  Fall Visual Posted: Fall sign posted, Socks  Room Door Open: Deferred to promote rest  Alarm On: Bed  Patient Moved Closer to Nursing Station: No    Active Consults:   IP CONSULT TO GI    Length of Stay:  Expected LOS: 5  Actual LOS: 1    Srini Mcdaniels, RN

## 2025-04-08 NOTE — PLAN OF CARE
Problem: Discharge Planning  Goal: Discharge to home or other facility with appropriate resources  4/8/2025 1133 by Rachel Villalba RN  Outcome: Progressing  4/8/2025 0101 by Srini Mcdaniels RN  Outcome: Progressing     Problem: Skin/Tissue Integrity  Goal: Skin integrity remains intact  Description: 1.  Monitor for areas of redness and/or skin breakdown  2.  Assess vascular access sites hourly  3.  Every 4-6 hours minimum:  Change oxygen saturation probe site  4.  Every 4-6 hours:  If on nasal continuous positive airway pressure, respiratory therapy assess nares and determine need for appliance change or resting period  4/8/2025 1133 by Rachel Villalba RN  Outcome: Progressing  4/8/2025 0101 by Srini Mcdaniels RN  Outcome: Progressing     Problem: Safety - Adult  Goal: Free from fall injury  4/8/2025 1133 by Rachel Villalba RN  Outcome: Progressing  4/8/2025 0101 by Srini Mcdaniels RN  Outcome: Progressing     Problem: Neurosensory - Adult  Goal: Achieves stable or improved neurological status  4/8/2025 1133 by Rachel Villalba RN  Outcome: Progressing  4/8/2025 0101 by Srini Mcdaniels RN  Outcome: Progressing  Goal: Achieves maximal functionality and self care  4/8/2025 1133 by Rachel Villalba RN  Outcome: Progressing  4/8/2025 0101 by Srini Mcdaniels RN  Outcome: Progressing     Problem: Respiratory - Adult  Goal: Achieves optimal ventilation and oxygenation  4/8/2025 1133 by Rachel Villalba RN  Outcome: Progressing  4/8/2025 0101 by Srini Mcdaniels RN  Outcome: Progressing     Problem: Cardiovascular - Adult  Goal: Maintains optimal cardiac output and hemodynamic stability  4/8/2025 1133 by Rachel Villalba RN  Outcome: Progressing  4/8/2025 0101 by Srini Mcdaniels RN  Outcome: Progressing  Goal: Absence of cardiac dysrhythmias or at baseline  4/8/2025 1133 by Rachel Villalba RN  Outcome: Progressing  4/8/2025 0101 by Srini Mcdaniels RN  Outcome: Progressing     Problem: Skin/Tissue Integrity -

## 2025-04-08 NOTE — PLAN OF CARE
Problem: Discharge Planning  Goal: Discharge to home or other facility with appropriate resources  Outcome: Progressing     Problem: Skin/Tissue Integrity  Goal: Skin integrity remains intact  Description: 1.  Monitor for areas of redness and/or skin breakdown  2.  Assess vascular access sites hourly  3.  Every 4-6 hours minimum:  Change oxygen saturation probe site  4.  Every 4-6 hours:  If on nasal continuous positive airway pressure, respiratory therapy assess nares and determine need for appliance change or resting period  Outcome: Progressing     Problem: Safety - Adult  Goal: Free from fall injury  Outcome: Progressing     Problem: Neurosensory - Adult  Goal: Achieves stable or improved neurological status  Outcome: Progressing  Goal: Achieves maximal functionality and self care  Outcome: Progressing     Problem: Respiratory - Adult  Goal: Achieves optimal ventilation and oxygenation  Outcome: Progressing     Problem: Cardiovascular - Adult  Goal: Maintains optimal cardiac output and hemodynamic stability  Outcome: Progressing  Goal: Absence of cardiac dysrhythmias or at baseline  Outcome: Progressing     Problem: Skin/Tissue Integrity - Adult  Goal: Skin integrity remains intact  Description: 1.  Monitor for areas of redness and/or skin breakdown  2.  Assess vascular access sites hourly  3.  Every 4-6 hours minimum:  Change oxygen saturation probe site  4.  Every 4-6 hours:  If on nasal continuous positive airway pressure, respiratory therapy assess nares and determine need for appliance change or resting period  Outcome: Progressing  Goal: Incisions, wounds, or drain sites healing without S/S of infection  Outcome: Progressing  Goal: Oral mucous membranes remain intact  Outcome: Progressing     Problem: Musculoskeletal - Adult  Goal: Return mobility to safest level of function  Outcome: Progressing  Goal: Maintain proper alignment of affected body part  Outcome: Progressing  Goal: Return ADL status to a

## 2025-04-09 ENCOUNTER — APPOINTMENT (OUTPATIENT)
Facility: HOSPITAL | Age: 80
DRG: 871 | End: 2025-04-09
Payer: MEDICARE

## 2025-04-09 LAB
ANION GAP SERPL CALC-SCNC: 9 MMOL/L (ref 2–12)
ARTERIAL PATENCY WRIST A: YES
BASE DEFICIT BLDA-SCNC: 3.2 MMOL/L
BASOPHILS # BLD: 0.04 K/UL (ref 0–0.1)
BASOPHILS NFR BLD: 0.3 % (ref 0–1)
BDY SITE: ABNORMAL
BUN SERPL-MCNC: 28 MG/DL (ref 6–20)
BUN/CREAT SERPL: 25 (ref 12–20)
CALCIUM SERPL-MCNC: 8.8 MG/DL (ref 8.5–10.1)
CHLORIDE SERPL-SCNC: 104 MMOL/L (ref 97–108)
CO2 SERPL-SCNC: 19 MMOL/L (ref 21–32)
CREAT SERPL-MCNC: 1.11 MG/DL (ref 0.7–1.3)
DIFFERENTIAL METHOD BLD: ABNORMAL
EKG ATRIAL RATE: 123 BPM
EKG DIAGNOSIS: NORMAL
EKG P AXIS: 26 DEGREES
EKG P-R INTERVAL: 188 MS
EKG Q-T INTERVAL: 310 MS
EKG QRS DURATION: 108 MS
EKG QTC CALCULATION (BAZETT): 443 MS
EKG R AXIS: -11 DEGREES
EKG T AXIS: 153 DEGREES
EKG VENTRICULAR RATE: 123 BPM
EOSINOPHIL # BLD: 0.04 K/UL (ref 0–0.4)
EOSINOPHIL NFR BLD: 0.3 % (ref 0–7)
ERYTHROCYTE [DISTWIDTH] IN BLOOD BY AUTOMATED COUNT: 16.5 % (ref 11.5–14.5)
GAS FLOW.O2 O2 DELIVERY SYS: 1 L/MIN
GLUCOSE BLD STRIP.AUTO-MCNC: 200 MG/DL (ref 65–117)
GLUCOSE SERPL-MCNC: 117 MG/DL (ref 65–100)
HCO3 BLDA-SCNC: 20 MMOL/L (ref 22–26)
HCT VFR BLD AUTO: 32 % (ref 36.6–50.3)
HGB BLD-MCNC: 10 G/DL (ref 12.1–17)
IMM GRANULOCYTES # BLD AUTO: 0.09 K/UL (ref 0–0.04)
IMM GRANULOCYTES NFR BLD AUTO: 0.6 % (ref 0–0.5)
LYMPHOCYTES # BLD: 0.86 K/UL (ref 0.8–3.5)
LYMPHOCYTES NFR BLD: 6.1 % (ref 12–49)
MCH RBC QN AUTO: 26.5 PG (ref 26–34)
MCHC RBC AUTO-ENTMCNC: 31.3 G/DL (ref 30–36.5)
MCV RBC AUTO: 84.9 FL (ref 80–99)
MONOCYTES # BLD: 1.07 K/UL (ref 0–1)
MONOCYTES NFR BLD: 7.6 % (ref 5–13)
NEUTS SEG # BLD: 11.94 K/UL (ref 1.8–8)
NEUTS SEG NFR BLD: 85.1 % (ref 32–75)
NRBC # BLD: 0 K/UL (ref 0–0.01)
NRBC BLD-RTO: 0 PER 100 WBC
NT PRO BNP: ABNORMAL PG/ML
PCO2 BLDA: 30 MMHG (ref 35–45)
PH BLDA: 7.44 (ref 7.35–7.45)
PLATELET # BLD AUTO: 347 K/UL (ref 150–400)
PMV BLD AUTO: 10.6 FL (ref 8.9–12.9)
PO2 BLDA: 101 MMHG (ref 80–100)
POTASSIUM SERPL-SCNC: 3.8 MMOL/L (ref 3.5–5.1)
RBC # BLD AUTO: 3.77 M/UL (ref 4.1–5.7)
SAO2 % BLD: 98 % (ref 92–97)
SAO2% DEVICE SAO2% SENSOR NAME: ABNORMAL
SERVICE CMNT-IMP: ABNORMAL
SODIUM SERPL-SCNC: 132 MMOL/L (ref 136–145)
SPECIMEN SITE: ABNORMAL
WBC # BLD AUTO: 14 K/UL (ref 4.1–11.1)

## 2025-04-09 PROCEDURE — 2500000003 HC RX 250 WO HCPCS: Performed by: INTERNAL MEDICINE

## 2025-04-09 PROCEDURE — 97530 THERAPEUTIC ACTIVITIES: CPT | Performed by: PHYSICAL THERAPIST

## 2025-04-09 PROCEDURE — 36600 WITHDRAWAL OF ARTERIAL BLOOD: CPT

## 2025-04-09 PROCEDURE — 2580000003 HC RX 258: Performed by: INTERNAL MEDICINE

## 2025-04-09 PROCEDURE — 2700000000 HC OXYGEN THERAPY PER DAY

## 2025-04-09 PROCEDURE — 97535 SELF CARE MNGMENT TRAINING: CPT

## 2025-04-09 PROCEDURE — 6360000002 HC RX W HCPCS: Performed by: INTERNAL MEDICINE

## 2025-04-09 PROCEDURE — 80048 BASIC METABOLIC PNL TOTAL CA: CPT

## 2025-04-09 PROCEDURE — 1100000003 HC PRIVATE W/ TELEMETRY

## 2025-04-09 PROCEDURE — 71045 X-RAY EXAM CHEST 1 VIEW: CPT

## 2025-04-09 PROCEDURE — 94640 AIRWAY INHALATION TREATMENT: CPT

## 2025-04-09 PROCEDURE — 85025 COMPLETE CBC W/AUTO DIFF WBC: CPT

## 2025-04-09 PROCEDURE — 6370000000 HC RX 637 (ALT 250 FOR IP): Performed by: INTERNAL MEDICINE

## 2025-04-09 PROCEDURE — 82803 BLOOD GASES ANY COMBINATION: CPT

## 2025-04-09 PROCEDURE — 83880 ASSAY OF NATRIURETIC PEPTIDE: CPT

## 2025-04-09 PROCEDURE — 94760 N-INVAS EAR/PLS OXIMETRY 1: CPT

## 2025-04-09 PROCEDURE — 82962 GLUCOSE BLOOD TEST: CPT

## 2025-04-09 PROCEDURE — 97116 GAIT TRAINING THERAPY: CPT | Performed by: PHYSICAL THERAPIST

## 2025-04-09 PROCEDURE — 36415 COLL VENOUS BLD VENIPUNCTURE: CPT

## 2025-04-09 RX ORDER — BUDESONIDE 0.5 MG/2ML
0.5 INHALANT ORAL
Status: DISCONTINUED | OUTPATIENT
Start: 2025-04-09 | End: 2025-04-19

## 2025-04-09 RX ORDER — IPRATROPIUM BROMIDE AND ALBUTEROL SULFATE 2.5; .5 MG/3ML; MG/3ML
1 SOLUTION RESPIRATORY (INHALATION) EVERY 4 HOURS PRN
Status: DISCONTINUED | OUTPATIENT
Start: 2025-04-09 | End: 2025-04-13

## 2025-04-09 RX ORDER — IPRATROPIUM BROMIDE AND ALBUTEROL SULFATE 2.5; .5 MG/3ML; MG/3ML
1 SOLUTION RESPIRATORY (INHALATION)
Status: DISCONTINUED | OUTPATIENT
Start: 2025-04-09 | End: 2025-04-09

## 2025-04-09 RX ADMIN — PANTOPRAZOLE SODIUM 40 MG: 40 INJECTION, POWDER, LYOPHILIZED, FOR SOLUTION INTRAVENOUS at 08:44

## 2025-04-09 RX ADMIN — DONEPEZIL HYDROCHLORIDE 10 MG: 5 TABLET, FILM COATED ORAL at 21:08

## 2025-04-09 RX ADMIN — ENOXAPARIN SODIUM 40 MG: 100 INJECTION SUBCUTANEOUS at 08:44

## 2025-04-09 RX ADMIN — BUDESONIDE 500 MCG: 0.5 INHALANT RESPIRATORY (INHALATION) at 12:00

## 2025-04-09 RX ADMIN — HALOPERIDOL LACTATE 2 MG: 5 INJECTION, SOLUTION INTRAMUSCULAR at 03:40

## 2025-04-09 RX ADMIN — SODIUM CHLORIDE, PRESERVATIVE FREE 10 ML: 5 INJECTION INTRAVENOUS at 21:09

## 2025-04-09 RX ADMIN — CARBIDOPA AND LEVODOPA 1 TABLET: 25; 100 TABLET, EXTENDED RELEASE ORAL at 08:44

## 2025-04-09 RX ADMIN — MEMANTINE 5 MG: 10 TABLET ORAL at 08:44

## 2025-04-09 RX ADMIN — ASPIRIN 81 MG: 81 TABLET, COATED ORAL at 21:08

## 2025-04-09 RX ADMIN — FINASTERIDE 5 MG: 5 TABLET, FILM COATED ORAL at 08:44

## 2025-04-09 RX ADMIN — OXYBUTYNIN CHLORIDE 5 MG: 5 TABLET, EXTENDED RELEASE ORAL at 18:13

## 2025-04-09 RX ADMIN — BUDESONIDE 500 MCG: 0.5 INHALANT RESPIRATORY (INHALATION) at 20:44

## 2025-04-09 RX ADMIN — HALOPERIDOL LACTATE 2 MG: 5 INJECTION, SOLUTION INTRAMUSCULAR at 21:08

## 2025-04-09 RX ADMIN — TROSPIUM CHLORIDE 20 MG: 20 TABLET, FILM COATED ORAL at 21:08

## 2025-04-09 RX ADMIN — POLYETHYLENE GLYCOL 3350 17 G: 17 POWDER, FOR SOLUTION ORAL at 08:43

## 2025-04-09 RX ADMIN — WATER 1000 MG: 1 INJECTION INTRAMUSCULAR; INTRAVENOUS; SUBCUTANEOUS at 18:13

## 2025-04-09 RX ADMIN — SERTRALINE 50 MG: 50 TABLET, FILM COATED ORAL at 08:44

## 2025-04-09 RX ADMIN — TAMSULOSIN HYDROCHLORIDE 0.4 MG: 0.4 CAPSULE ORAL at 08:43

## 2025-04-09 RX ADMIN — CARBIDOPA AND LEVODOPA 1 TABLET: 25; 100 TABLET, EXTENDED RELEASE ORAL at 21:08

## 2025-04-09 RX ADMIN — SODIUM CHLORIDE: 0.9 INJECTION, SOLUTION INTRAVENOUS at 08:38

## 2025-04-09 RX ADMIN — MEMANTINE 5 MG: 10 TABLET ORAL at 21:08

## 2025-04-09 RX ADMIN — SODIUM CHLORIDE, PRESERVATIVE FREE 10 ML: 5 INJECTION INTRAVENOUS at 08:44

## 2025-04-09 RX ADMIN — IPRATROPIUM BROMIDE AND ALBUTEROL SULFATE 1 DOSE: 2.5; .5 SOLUTION RESPIRATORY (INHALATION) at 12:00

## 2025-04-09 RX ADMIN — MELATONIN 6 MG: at 21:07

## 2025-04-09 RX ADMIN — AZITHROMYCIN MONOHYDRATE 500 MG: 500 INJECTION, POWDER, LYOPHILIZED, FOR SOLUTION INTRAVENOUS at 18:20

## 2025-04-09 RX ADMIN — IPRATROPIUM BROMIDE AND ALBUTEROL SULFATE 1 DOSE: 2.5; .5 SOLUTION RESPIRATORY (INHALATION) at 20:44

## 2025-04-09 RX ADMIN — ATORVASTATIN CALCIUM 20 MG: 20 TABLET, FILM COATED ORAL at 21:08

## 2025-04-09 NOTE — CARE COORDINATION
Care Management Initial Assessment       RUR: 14% Low Risk  Readmission? No  1st IM letter given? Yes  1st  letter given: No    Initial Assessment: Chart reviewed. CM met with pt and pts spouse to introduce self and role. Verified contact information and demographics. Pt resides with spouse in a one level home with 8 BEHZAD. Pt PCP is Dr. Barksdale with last visit being one week ago. Preferred pharmacy is [facility] in [place], [place]. Pt has no hx needing HH/IPR/SNF services. Pt states he is independent with ADL's and is an active . ?Pts spouse will transport for discharge.?He?states there are no concerns for her to return home. Pt does not have home o2 this has been confirmed by his wife and PCP office. CM will continue to follow.    Plan A: Home with follow up appts.  Plan B: Home with HH  Full assessment below:           04/09/25 2722   Service Assessment   Patient Orientation Alert and Oriented;Person;Place;Situation;Self   Cognition Alert   History Provided By Patient   Primary Caregiver Self   Support Systems Spouse/Significant Other   Patient's Healthcare Decision Maker is: Legal Next of Kin   PCP Verified by CM Yes   Last Visit to PCP Within last 6 months   Prior Functional Level Independent in ADLs/IADLs   Current Functional Level Independent in ADLs/IADLs   Can patient return to prior living arrangement Yes   Ability to make needs known: Good   Family able to assist with home care needs: Yes   Would you like for me to discuss the discharge plan with any other family members/significant others, and if so, who? Yes  (Avril Rausch (Spouse)  [phone] (Mobile))   Financial Resources Medicare   Social/Functional History   Lives With Spouse   Type of Home House   Home Layout One level   Entrance Stairs - Number of Steps 2   Home Equipment None   Active  Yes   Discharge Planning   Type of Residence House   Current Services Prior To Admission None   Patient expects to be discharged to: House

## 2025-04-09 NOTE — PROGRESS NOTES
Hospitalist Progress Note               Daily Progress Note: 4/9/2025      Hospital Day: 3     Chief complaint:   Chief Complaint   Patient presents with    Shortness of Breath     Pt ambulatory with cane to triage, reports SOB ongoing x many years, worsening last night. Pt's wife report that his work of breathing significantly increased last night and she was concerned he would pass out. Patient hx of dementia, cardiac stents, parkinsons.         Subjective:   Hospital course to date:  Pleasant 80-year-old gentleman with history of Parkinson's disease complicated by dysphagia, dementia, essential hypertension, coronary artery disease status post CABG admitted with reports of increasing difficulty swallowing with possible aspiration.    --------  Patient is seen today for follow-up.  Resting comfortably.  Short of breath with minimal exertion.  Remains on 2 L nasal O2.  Arterial blood gas appropriate with pH of 7.44 pCO2 of 30 and pO2 of 101.      Medications reviewed  Current Facility-Administered Medications   Medication Dose Route Frequency    ipratropium 0.5 mg-albuterol 2.5 mg (DUONEB) nebulizer solution 1 Dose  1 Dose Inhalation Q4H WA RT    budesonide (PULMICORT) nebulizer suspension 500 mcg  0.5 mg Nebulization BID RT    0.9 % sodium chloride infusion   IntraVENous Continuous    sodium chloride flush 0.9 % injection 5-40 mL  5-40 mL IntraVENous 2 times per day    sodium chloride flush 0.9 % injection 5-40 mL  5-40 mL IntraVENous PRN    0.9 % sodium chloride infusion   IntraVENous PRN    enoxaparin (LOVENOX) injection 40 mg  40 mg SubCUTAneous Daily    ondansetron (ZOFRAN-ODT) disintegrating tablet 4 mg  4 mg Oral Q8H PRN    Or    ondansetron (ZOFRAN) injection 4 mg  4 mg IntraVENous Q6H PRN    polyethylene glycol (GLYCOLAX) packet 17 g  17 g Oral Daily    bisacodyl (DULCOLAX) suppository 10 mg  10 mg Rectal Daily PRN    acetaminophen (TYLENOL) tablet 650 mg  650 mg Oral Q6H PRN    Or    acetaminophen

## 2025-04-09 NOTE — PROGRESS NOTES
End of Shift Note    Bedside shift change report given to Dara BONILLA (oncoming nurse) by Rachel Villalba, BAR (offgoing nurse).  Report included the following information SBAR, MAR, Cardiac Rhythm Sinus Tachy , and Quality Measures    Shift worked:  7a-7p     Shift summary and any significant changes:     Pt up in chair most of shfit. Pt had no significant changes during shift. GI signed off so EGD was not completed today. Pt diet order changed to Regular GI bland. Meds given per MAR. Pt on 2L of O2 pt gets dyspneic when ambulating. Pt care is still ongoing.      Concerns for physician to address:       Zone phone for oncoming shift:          Activity:  Level of Assistance: Minimal assist, patient does 75% or more  Number times ambulated in hallways past shift: 0  Number of times OOB to chair past shift: 1    Cardiac:   Cardiac Monitoring: Yes      Cardiac Rhythm: Sinus tachy    Access:  Current line(s): PIV     Genitourinary:   Urinary Status: Voiding, Bathroom privileges    Respiratory:   O2 Device: Nasal cannula  Chronic home O2 use?: NO  Incentive spirometer at bedside: NO    GI:     Current diet:  ADULT DIET; Regular; GI [place] (GERD/Peptic Ulcer)  Passing flatus: YES    Pain Management:   Patient states pain is manageable on current regimen: N/A    Skin:  Krishna Scale Score: 18  Interventions: Wound Offloading (Prevention Methods): Repositioning, Turning    Patient Safety:  Fall Risk: Nursing Judgement-Fall Risk High(Add Comments): Yes  Fall Risk Interventions  Nursing Judgement-Fall Risk High(Add Comments): Yes  Toilet Every 2 Hours-In Advance of Need: Yes  Hourly Visual Checks: In bed  Fall Visual Posted: Fall sign posted  Room Door Open: Deferred to promote rest  Alarm On: Bed  Patient Moved Closer to Nursing Station: No    Active Consults:   IP CONSULT TO GI    Length of Stay:  Expected LOS: 4  Actual LOS: 1    Rachel Villalba, BAR

## 2025-04-09 NOTE — PLAN OF CARE
Problem: Physical Therapy - Adult  Goal: By Discharge: Performs mobility at highest level of function for planned discharge setting.  See evaluation for individualized goals.  Description: FUNCTIONAL STATUS PRIOR TO ADMISSION: Pt ambulates independently with use of SBQC. Endorses h/o falls. Stopped driving Nov 2024. Pt lives with wife who provides support as needed. PMH includes dementia and Parkinson's disease.     Physical Therapy Goals  Initiated 4/8/2025  1.  Patient will move from supine to sit and sit to supine in bed with modified independence within 7 day(s).    2.  Patient will perform sit to stand with modified independence within 7 day(s).  3.  Patient will transfer from bed to chair and chair to bed with supervision/set-up using the least restrictive device within 7 day(s).  4.  Patient will ambulate with supervision/set-up for 100 feet with the least restrictive device within 7 day(s).   5.  Patient will ascend/descend 3 stairs with QC and minimal assistance within 7 day(s).   Outcome: Progressing   PHYSICAL THERAPY TREATMENT    Patient: Jonatan Rausch JrPolly (80 y.o. male)  Date: 4/9/2025  Diagnosis: Sepsis ([facility]) [A41.9]  Community acquired pneumonia, unspecified laterality [J18.9] Sepsis ([facility])      Precautions: Restrictions/Precautions  Restrictions/Precautions: Fall Risk            ASSESSMENT:  Patient continues to benefit from skilled PT services and is progressing towards goals. Patient overall limited by impaired balance, gait instability, generalized weakness, and decreased activity tolerance.  Currently patient supine in bed but had scooted so far down that his feet where over the bottom bed rail.  Unable to come to sit on his own and needing up to modA secondary to poor positioning in the bed.  Otherwise is CGA for transfers and amb to and from the bathroom with quad cane and Ron.  Anticipate patient would do better with RW but he is unwilling.  SpO2 90% on 2 L O2 with activity with HR has high

## 2025-04-09 NOTE — PLAN OF CARE
Problem: Occupational Therapy - Adult  Goal: By Discharge: Performs self-care activities at highest level of function for planned discharge setting.  See evaluation for individualized goals.  Description: FUNCTIONAL STATUS PRIOR TO ADMISSION:  pt reports independence w/ ADLs and mod I for mobility w/ quad cane.    , Prior Level of Assist for ADLs: Independent,  ,  ,  ,  ,  ,  ,  , Prior Level of Assist for Transfers: Independent, Active : No     HOME SUPPORT: pt lives w/ supportive spouse, who can assist, PRN.     Occupational Therapy Goals:  Initiated 4/8/2025  1.  Patient will perform grooming while in standing at sink w/ Quad cane with Modified [place] within 7 day(s).  2.  Patient will perform upper body dressing with [place] within 7 day(s).  3.  Patient will perform lower body dressing with [place] within 7 day(s).  4.  Patient will perform toilet transfers with Modified [place]  within 7 day(s).  5.  Patient will perform all aspects of toileting with [place] within 7 day(s).  6.  Patient will participate in upper extremity therapeutic exercise/activities with Supervision for 7 minutes within 7 day(s).    7.  Patient will utilize energy conservation techniques during functional activities with verbal cues within 7 day(s).     Outcome: Progressing    OCCUPATIONAL THERAPY TREATMENT  Patient: Jonatan Rausch Jr. (80 y.o. male)  Date: 4/9/2025  Primary Diagnosis: Sepsis ([facility]) [A41.9]  Community acquired pneumonia, unspecified laterality [J18.9]       Precautions: Fall Risk                Chart, occupational therapy assessment, plan of care, and goals were reviewed.    ASSESSMENT  Patient continues to benefit from skilled OT services and is slowly progressing towards goals. Patient received in chair and agreeable to therapy. Patient with CGA to stand ambulate to ink with quad cane - patient noted to be unsteady but declined use of RW. In standing at sink, patient with CGA for

## 2025-04-09 NOTE — PROGRESS NOTES
End of Shift Note    Bedside shift change report given to Yi DINERO (oncoming nurse) by Dara Almonte LPN (offgoing nurse).  Report included the following information SBAR, Kardex, MAR, Alarm Parameters , and Quality Measures    Shift worked:  7p-7a     Shift summary and any significant changes:     Patient tolerated all medications on this shift. Patient began to sundown around 2000. He has been impulsive most of th shift. He has taken out one of his IV's and he took off his tele monitor/stickers. He also has attempted to get out of bed several times. Patient is directable and can follow commands but has had confusion during this shift. Attempted use row belt for patient on this shift but it was unsuccessful. 2l O2 has remained on the patient this shift because of dyspnea. No further complaints or concerns.      Concerns for physician to address:  none     Zone phone for oncoming shift:   [phone]       Activity:  Level of Assistance: Standby assist, set-up cues, supervision of patient - no hands on  Number times ambulated in hallways past shift: 0  Number of times OOB to chair past shift: 0    Cardiac:   Cardiac Monitoring: Yes      Cardiac Rhythm: Sinus tachy    Access:  Current line(s): PIV     Genitourinary:   Urinary Status: Voiding, Bathroom privileges (urinal)    Respiratory:   O2 Device: Nasal cannula  Chronic home O2 use?: NO  Incentive spirometer at bedside: YES    GI:     Current diet:  ADULT DIET; Regular; GI [place] (GERD/Peptic Ulcer)  Passing flatus: YES    Pain Management:   Patient states pain is manageable on current regimen: YES    Skin:  Krishna Scale Score: 17  Interventions: Wound Offloading (Prevention Methods): Pillows, Repositioning    Patient Safety:  Fall Risk: Nursing Judgement-Fall Risk High(Add Comments): Yes  Fall Risk Interventions  Nursing Judgement-Fall Risk High(Add Comments): Yes  Toilet Every 2 Hours-In Advance of Need: Yes  Hourly Visual Checks: Awake, In bed, Confused  Fall Visual  Posted: Fall sign posted  Room Door Open: Deferred to promote rest  Alarm On: Bed  Patient Moved Closer to Nursing Station: No    Active Consults:   IP CONSULT TO GI    Length of Stay:  Expected LOS: 4  Actual LOS: 2    Dara Almonte LPN

## 2025-04-10 LAB
ANION GAP SERPL CALC-SCNC: 12 MMOL/L (ref 2–12)
BUN SERPL-MCNC: 34 MG/DL (ref 6–20)
BUN/CREAT SERPL: 25 (ref 12–20)
CALCIUM SERPL-MCNC: 9 MG/DL (ref 8.5–10.1)
CHLORIDE SERPL-SCNC: 101 MMOL/L (ref 97–108)
CO2 SERPL-SCNC: 18 MMOL/L (ref 21–32)
CREAT SERPL-MCNC: 1.34 MG/DL (ref 0.7–1.3)
GLUCOSE SERPL-MCNC: 170 MG/DL (ref 65–100)
POTASSIUM SERPL-SCNC: 3.8 MMOL/L (ref 3.5–5.1)
SODIUM SERPL-SCNC: 131 MMOL/L (ref 136–145)

## 2025-04-10 PROCEDURE — 2580000003 HC RX 258: Performed by: INTERNAL MEDICINE

## 2025-04-10 PROCEDURE — 1100000003 HC PRIVATE W/ TELEMETRY

## 2025-04-10 PROCEDURE — 6360000002 HC RX W HCPCS: Performed by: INTERNAL MEDICINE

## 2025-04-10 PROCEDURE — 2700000000 HC OXYGEN THERAPY PER DAY

## 2025-04-10 PROCEDURE — 94640 AIRWAY INHALATION TREATMENT: CPT

## 2025-04-10 PROCEDURE — 2500000003 HC RX 250 WO HCPCS: Performed by: INTERNAL MEDICINE

## 2025-04-10 PROCEDURE — 97530 THERAPEUTIC ACTIVITIES: CPT

## 2025-04-10 PROCEDURE — 97535 SELF CARE MNGMENT TRAINING: CPT

## 2025-04-10 PROCEDURE — 97530 THERAPEUTIC ACTIVITIES: CPT | Performed by: PHYSICAL THERAPIST

## 2025-04-10 PROCEDURE — 6370000000 HC RX 637 (ALT 250 FOR IP): Performed by: INTERNAL MEDICINE

## 2025-04-10 PROCEDURE — 97116 GAIT TRAINING THERAPY: CPT | Performed by: PHYSICAL THERAPIST

## 2025-04-10 PROCEDURE — 36415 COLL VENOUS BLD VENIPUNCTURE: CPT

## 2025-04-10 PROCEDURE — 80048 BASIC METABOLIC PNL TOTAL CA: CPT

## 2025-04-10 RX ORDER — FUROSEMIDE 10 MG/ML
40 INJECTION INTRAMUSCULAR; INTRAVENOUS 2 TIMES DAILY
Status: DISCONTINUED | OUTPATIENT
Start: 2025-04-10 | End: 2025-04-13

## 2025-04-10 RX ORDER — QUETIAPINE FUMARATE 25 MG/1
25 TABLET, FILM COATED ORAL EVERY EVENING
Status: DISCONTINUED | OUTPATIENT
Start: 2025-04-10 | End: 2025-04-20 | Stop reason: HOSPADM

## 2025-04-10 RX ADMIN — IPRATROPIUM BROMIDE AND ALBUTEROL SULFATE 1 DOSE: 2.5; .5 SOLUTION RESPIRATORY (INHALATION) at 20:41

## 2025-04-10 RX ADMIN — AZITHROMYCIN MONOHYDRATE 500 MG: 500 INJECTION, POWDER, LYOPHILIZED, FOR SOLUTION INTRAVENOUS at 16:48

## 2025-04-10 RX ADMIN — POLYETHYLENE GLYCOL 3350 17 G: 17 POWDER, FOR SOLUTION ORAL at 08:48

## 2025-04-10 RX ADMIN — BUDESONIDE 500 MCG: 0.5 INHALANT RESPIRATORY (INHALATION) at 20:41

## 2025-04-10 RX ADMIN — FINASTERIDE 5 MG: 5 TABLET, FILM COATED ORAL at 08:48

## 2025-04-10 RX ADMIN — CARBIDOPA AND LEVODOPA 1 TABLET: 25; 100 TABLET, EXTENDED RELEASE ORAL at 21:48

## 2025-04-10 RX ADMIN — BUDESONIDE 500 MCG: 0.5 INHALANT RESPIRATORY (INHALATION) at 09:20

## 2025-04-10 RX ADMIN — PANTOPRAZOLE SODIUM 40 MG: 40 INJECTION, POWDER, LYOPHILIZED, FOR SOLUTION INTRAVENOUS at 08:49

## 2025-04-10 RX ADMIN — ATORVASTATIN CALCIUM 20 MG: 20 TABLET, FILM COATED ORAL at 21:49

## 2025-04-10 RX ADMIN — FUROSEMIDE 40 MG: 10 INJECTION, SOLUTION INTRAMUSCULAR; INTRAVENOUS at 16:55

## 2025-04-10 RX ADMIN — TAMSULOSIN HYDROCHLORIDE 0.4 MG: 0.4 CAPSULE ORAL at 08:48

## 2025-04-10 RX ADMIN — SERTRALINE 50 MG: 50 TABLET, FILM COATED ORAL at 08:49

## 2025-04-10 RX ADMIN — CARBIDOPA AND LEVODOPA 1 TABLET: 25; 100 TABLET, EXTENDED RELEASE ORAL at 08:48

## 2025-04-10 RX ADMIN — FUROSEMIDE 40 MG: 10 INJECTION, SOLUTION INTRAMUSCULAR; INTRAVENOUS at 09:11

## 2025-04-10 RX ADMIN — QUETIAPINE FUMARATE 25 MG: 25 TABLET ORAL at 16:58

## 2025-04-10 RX ADMIN — SODIUM CHLORIDE, PRESERVATIVE FREE 10 ML: 5 INJECTION INTRAVENOUS at 08:49

## 2025-04-10 RX ADMIN — MEMANTINE 5 MG: 10 TABLET ORAL at 08:48

## 2025-04-10 RX ADMIN — ASPIRIN 81 MG: 81 TABLET, COATED ORAL at 21:48

## 2025-04-10 RX ADMIN — MEMANTINE 5 MG: 10 TABLET ORAL at 21:49

## 2025-04-10 RX ADMIN — DONEPEZIL HYDROCHLORIDE 10 MG: 5 TABLET, FILM COATED ORAL at 21:48

## 2025-04-10 RX ADMIN — ENOXAPARIN SODIUM 40 MG: 100 INJECTION SUBCUTANEOUS at 08:48

## 2025-04-10 RX ADMIN — MELATONIN 6 MG: at 21:49

## 2025-04-10 RX ADMIN — WATER 1000 MG: 1 INJECTION INTRAMUSCULAR; INTRAVENOUS; SUBCUTANEOUS at 16:41

## 2025-04-10 RX ADMIN — TROSPIUM CHLORIDE 20 MG: 20 TABLET, FILM COATED ORAL at 21:48

## 2025-04-10 RX ADMIN — SODIUM CHLORIDE, PRESERVATIVE FREE 10 ML: 5 INJECTION INTRAVENOUS at 21:49

## 2025-04-10 RX ADMIN — PIPERACILLIN AND TAZOBACTAM 4500 MG: 4; .5 INJECTION, POWDER, LYOPHILIZED, FOR SOLUTION INTRAVENOUS; PARENTERAL at 18:39

## 2025-04-10 NOTE — PROGRESS NOTES
End of Shift Note    Bedside shift change report given to Yi DINERO (oncoming nurse) by Dara Almonte LPN (offgoing nurse).  Report included the following information SBAR, Kardex, Alarm Parameters , and Quality Measures    Shift worked:  7p-7a     Shift summary and any significant changes:     Patient tolerated all medications on this shift. Patient has been impulsive all shift. He has ambulated out of bed and chair several times during this shift. Halidol adminstered x1. Row belt applied while in chair but it was unsuccessful. Called rapid response nurse because patient was using accesory muscles to breathe. Rapid response nurse came and assessed the patient. Rapid response nurse suggested to discontinue the fluids because lungs have crackles. Messaged on call provider (Stephen Hernandez). MD agreed with stopping fluids. Patient received neb treatment x2 on this shift. No new orders placed. No further complaints or concerns.      Concerns for physician to address:  Respiratory      Zone phone for oncoming shift:   [phone]       Activity:  Level of Assistance: Standby assist, set-up cues, supervision of patient - no hands on  Number times ambulated in hallways past shift: 0  Number of times OOB to chair past shift: 2    Cardiac:   Cardiac Monitoring: Yes      Cardiac Rhythm: Sinus tachy    Access:  Current line(s): PIV     Genitourinary:   Urinary Status: Voiding, Bathroom privileges, Incontinent briefs    Respiratory:   O2 Device: Nasal cannula  Chronic home O2 use?: NO  Incentive spirometer at bedside: NO    GI:     Current diet:  ADULT DIET; Regular; GI [place] (GERD/Peptic Ulcer)  Passing flatus: YES    Pain Management:   Patient states pain is manageable on current regimen: YES    Skin:  Krishna Scale Score: 18  Interventions: Wound Offloading (Prevention Methods): Pillows, Repositioning    Patient Safety:  Fall Risk: Nursing Judgement-Fall Risk High(Add Comments): Yes  Fall Risk Interventions  Nursing  Judgement-Fall Risk High(Add Comments): Yes  Toilet Every 2 Hours-In Advance of Need: Yes  Hourly Visual Checks: Awake, In bed  Fall Visual Posted: Fall sign posted, Socks  Room Door Open: Yes  Alarm On: Bed, Chair  Patient Moved Closer to Nursing Station: No    Active Consults:   IP CONSULT TO GI    Length of Stay:  Expected LOS: 4  Actual LOS: 3    Dara Almonte LPN

## 2025-04-10 NOTE — PLAN OF CARE
Problem: Respiratory - Adult  Goal: Achieves optimal ventilation and oxygenation  4/9/2025 2214 by Laure Ramesh RCP  Outcome: Progressing

## 2025-04-10 NOTE — PLAN OF CARE
Problem: Physical Therapy - Adult  Goal: By Discharge: Performs mobility at highest level of function for planned discharge setting.  See evaluation for individualized goals.  Description: FUNCTIONAL STATUS PRIOR TO ADMISSION: Pt ambulates independently with use of SBQC. Endorses h/o falls. Stopped driving Nov 2024. Pt lives with wife who provides support as needed. PMH includes dementia and Parkinson's disease.     Physical Therapy Goals  Initiated 4/8/2025  1.  Patient will move from supine to sit and sit to supine in bed with modified independence within 7 day(s).    2.  Patient will perform sit to stand with modified independence within 7 day(s).  3.  Patient will transfer from bed to chair and chair to bed with supervision/set-up using the least restrictive device within 7 day(s).  4.  Patient will ambulate with supervision/set-up for 100 feet with the least restrictive device within 7 day(s).   5.  Patient will ascend/descend 3 stairs with QC and minimal assistance within 7 day(s).   Outcome: Progressing   PHYSICAL THERAPY TREATMENT    Patient: Jonatan Rausch JrPolly (80 y.o. male)  Date: 4/10/2025  Diagnosis: Sepsis ([facility]) [A41.9]  Community acquired pneumonia, unspecified laterality [J18.9] Sepsis ([facility])      Precautions: Restrictions/Precautions  Restrictions/Precautions: Fall Risk            ASSESSMENT:  Patient continues to benefit from skilled PT services and is progressing towards goals. Patient sitting in chair upon arrival and agreeable to therapy. Patient demonstrates impulsivity with mobility requiring frequent VC for safety. Patient able to complete sit<>stand transfers with SBA but requires CGA for ambulation. Patient ambulated 25 feet in room using quad cane- gait is unsteady demonstrating path deviations, short shuffled gait and frequently reaching for external support. Following seated rest patient then ambulated 25 feet using RW with improved gait stability.  Patient demonstrates increased dyspnea

## 2025-04-10 NOTE — PROGRESS NOTES
1000: pt restless, needing to use the bathroom frequently, but unable to empty bladder every time. Bladder scanned pt, revealed 75ml. Pt also had one small BM on commode, pt with large hemorrhoid noted to rectal area. Pt very dyspneic with exertion. Labored/abdominal breathing, tachypneic, sats 94% on 2L NC O2. Lungs clear to auscultation. Notified MD of the above. MD placed orders.   1830: pt just got back to bed from commode. Very labored, some wheezing noted, also diaphoretic. VSS pt sats 92% on 2L. Blood sugar 200. Notified respiratory to check on pt.    Pt continues to have labored breathing, called rapid nurse to see if they could check on pt.   1930: Bedside and Verbal shift change report given to IRENE Diamond  (oncoming nurse) by Yi DINERO (offgoing nurse).  Report given with SBAR, Kardex, Intake/Output, MAR and Recent Results.

## 2025-04-10 NOTE — PROGRESS NOTES
Hospitalist Progress Note    NAME:   Jonatan Rausch Jr.   : 1945   MRN: [id number]     Date/Time: 4/10/2025 6:13 PM  Patient PCP: Kenny Barksdale MD    Estimated discharge date:  Barriers:       Assessment / Plan:  Acute hypoxic respiratory failure  - CT showed multifocal pneumonia, patient has leukocytosis, however procalcitonin level is very low  -CT showed bilateral pleural effusion, and BNP of 13,000  - Started on Lasix 40 mg twice daily, switch Rocephin to Zosyn  -Echo pending    Intermittent dysphagia  - Consulted with GI  -Barium swallow 24  -There is no evidence of esophageal stricture or significant esophageal dysmotility.  - Given patient suffering from pneumonia and dysphagia is intermittent without pain, recommend outpatient follow-up    History of Parkinson's disease  Dementia  --Wife reports progression of disease  -- Continue Sinemet  -- Continue Aricept and Namenda  PT OT evaluation once cleared by GI     Essential hypertension  Hyperlipidemia  --Fairly stable     History of coronary artery disease status post CABG  -- Fairly stable        Code status:Full    Medical Decision Making:   I personally reviewed labs:  I personally reviewed imaging:  I personally reviewed EKG:  Toxic drug monitoring:   Discussed case with:         Code Status:   DVT Prophylaxis:   GI Prophylaxis:    Subjective:     Chief Complaint / Reason for Physician Visit   Discussed with RN events overnight.       Objective:     VITALS:   Last 24hrs VS reviewed since prior progress note. Most recent are:  Patient Vitals for the past 24 hrs:   BP Temp Temp src Pulse Resp SpO2   04/10/25 1543 118/86 -- Oral (!) 103 20 96 %   04/10/25 0920 -- -- -- -- -- 96 %   04/10/25 0851 (!) 127/97 -- -- -- -- --   04/10/25 0755 (!) 123/106 97.9 °F (36.6 °C) -- (!) 101 28 96 %   25 1950 112/88 97.7 °F (36.5 °C) -- (!) 114 30 97 %         Intake/Output Summary (Last 24 hours) at 4/10/2025 1813  Last data filed at 4/10/2025

## 2025-04-10 NOTE — PROGRESS NOTES
End of Shift Note    Bedside shift change report given to BAR Ramos (oncoming nurse) by Yi Parks RN (offgoing nurse).  Report included the following information SBAR, Kardex, Alarm Parameters , and Quality Measures    Shift worked:  7a-7p     Shift summary and any significant changes:     Patient continues to be short of breath both at rest and with exertion. Very dyspneic with exertion. MD started pt on lasix IV bid. Did notice some improvement after administering lasix. Pt voided 1L post lasix. Remains on 2L NC oxygen, with exertion pt will desat to 80's if not on oxygen. Worked with PT/OT, recommend using walker w/ ambulation. Pt oriented x4 but does have confusion and impulsiveness. Pt still needs echocardiogram. MD added seroquel at bedtime to help with sundowning.       Concerns for physician to address:  Respiratory status/ shortness of breath      Zone phone for oncoming shift:   [phone]       Activity:  Level of Assistance: Standby assist, set-up cues, supervision of patient - no hands on  Number times ambulated in hallways past shift: 0  Number of times OOB to chair past shift: 2    Cardiac:   Cardiac Monitoring: Yes      Cardiac Rhythm: Sinus tachy    Access:  Current line(s): PIV     Genitourinary:   Urinary Status: Voiding, External catheter    Respiratory:   O2 Device: Nasal cannula  Chronic home O2 use?: NO  Incentive spirometer at bedside: NO    GI:  Last BM (including prior to admit): 04/09/25  Current diet:  ADULT DIET; Regular; GI [place] (GERD/Peptic Ulcer)  Passing flatus: YES    Pain Management:   Patient states pain is manageable on current regimen: YES    Skin:  Krishna Scale Score: 18  Interventions: Wound Offloading (Prevention Methods): Pillows, Repositioning    Patient Safety:  Fall Risk: Nursing Judgement-Fall Risk High(Add Comments): Yes  Fall Risk Interventions  Nursing Judgement-Fall Risk High(Add Comments): Yes  Toilet Every 2 Hours-In Advance of Need: Yes  Hourly Visual Checks:  Awake, In bed  Fall Visual Posted: Fall sign posted, Socks  Room Door Open: Yes  Alarm On: Bed, Chair  Patient Moved Closer to Nursing Station: No    Active Consults:   IP CONSULT TO GI    Length of Stay:  Expected LOS: 7  Actual LOS: 3    Yi Parks, RN

## 2025-04-10 NOTE — PLAN OF CARE
Problem: Occupational Therapy - Adult  Goal: By Discharge: Performs self-care activities at highest level of function for planned discharge setting.  See evaluation for individualized goals.  Description: FUNCTIONAL STATUS PRIOR TO ADMISSION:  pt reports independence w/ ADLs and mod I for mobility w/ quad cane.    , Prior Level of Assist for ADLs: Independent,  ,  ,  ,  ,  ,  ,  , Prior Level of Assist for Transfers: Independent, Active : No     HOME SUPPORT: pt lives w/ supportive spouse, who can assist, PRN.     Occupational Therapy Goals:  Initiated 4/8/2025  1.  Patient will perform grooming while in standing at sink w/ Quad cane with Modified [place] within 7 day(s).  2.  Patient will perform upper body dressing with [place] within 7 day(s).  3.  Patient will perform lower body dressing with [place] within 7 day(s).  4.  Patient will perform toilet transfers with Modified [place]  within 7 day(s).  5.  Patient will perform all aspects of toileting with [place] within 7 day(s).  6.  Patient will participate in upper extremity therapeutic exercise/activities with Supervision for 7 minutes within 7 day(s).    7.  Patient will utilize energy conservation techniques during functional activities with verbal cues within 7 day(s).   Outcome: Progressing    OCCUPATIONAL THERAPY TREATMENT  Patient: Jonatan Rausch Jr. (80 y.o. male)  Date: 4/10/2025  Primary Diagnosis: Sepsis ([facility]) [A41.9]  Community acquired pneumonia, unspecified laterality [J18.9]       Precautions: Fall Risk                Chart, occupational therapy assessment, plan of care, and goals were reviewed.    ASSESSMENT  Patient continues to benefit from skilled OT services and is slowly progressing towards goals. Pt received supine in bed, agreeable to therapy services. Pt completed bed mobility and transfers w/ SBA to CGA. Pt received on 2L NC and satting at % while supine and seated at EOB, placed pt on RA during in

## 2025-04-11 ENCOUNTER — APPOINTMENT (OUTPATIENT)
Facility: HOSPITAL | Age: 80
DRG: 871 | End: 2025-04-11
Payer: MEDICARE

## 2025-04-11 LAB
ANION GAP SERPL CALC-SCNC: 8 MMOL/L (ref 2–12)
BUN SERPL-MCNC: 33 MG/DL (ref 6–20)
BUN/CREAT SERPL: 26 (ref 12–20)
CALCIUM SERPL-MCNC: 8.4 MG/DL (ref 8.5–10.1)
CHLORIDE SERPL-SCNC: 98 MMOL/L (ref 97–108)
CO2 SERPL-SCNC: 24 MMOL/L (ref 21–32)
CREAT SERPL-MCNC: 1.28 MG/DL (ref 0.7–1.3)
GLUCOSE SERPL-MCNC: 94 MG/DL (ref 65–100)
POTASSIUM SERPL-SCNC: 3.2 MMOL/L (ref 3.5–5.1)
SODIUM SERPL-SCNC: 130 MMOL/L (ref 136–145)

## 2025-04-11 PROCEDURE — 80048 BASIC METABOLIC PNL TOTAL CA: CPT

## 2025-04-11 PROCEDURE — 1100000003 HC PRIVATE W/ TELEMETRY

## 2025-04-11 PROCEDURE — 2700000000 HC OXYGEN THERAPY PER DAY

## 2025-04-11 PROCEDURE — 2500000003 HC RX 250 WO HCPCS: Performed by: INTERNAL MEDICINE

## 2025-04-11 PROCEDURE — 6360000002 HC RX W HCPCS: Performed by: INTERNAL MEDICINE

## 2025-04-11 PROCEDURE — 36415 COLL VENOUS BLD VENIPUNCTURE: CPT

## 2025-04-11 PROCEDURE — 2580000003 HC RX 258: Performed by: INTERNAL MEDICINE

## 2025-04-11 PROCEDURE — 6370000000 HC RX 637 (ALT 250 FOR IP): Performed by: INTERNAL MEDICINE

## 2025-04-11 PROCEDURE — 71045 X-RAY EXAM CHEST 1 VIEW: CPT

## 2025-04-11 PROCEDURE — 94640 AIRWAY INHALATION TREATMENT: CPT

## 2025-04-11 RX ORDER — AZITHROMYCIN 250 MG/1
500 TABLET, FILM COATED ORAL DAILY
Status: DISCONTINUED | OUTPATIENT
Start: 2025-04-12 | End: 2025-04-19

## 2025-04-11 RX ORDER — POTASSIUM CHLORIDE 1500 MG/1
40 TABLET, EXTENDED RELEASE ORAL 2 TIMES DAILY
Status: DISCONTINUED | OUTPATIENT
Start: 2025-04-11 | End: 2025-04-17

## 2025-04-11 RX ADMIN — SODIUM CHLORIDE: 0.9 INJECTION, SOLUTION INTRAVENOUS at 01:38

## 2025-04-11 RX ADMIN — CARBIDOPA AND LEVODOPA 1 TABLET: 25; 100 TABLET, EXTENDED RELEASE ORAL at 20:49

## 2025-04-11 RX ADMIN — SERTRALINE 50 MG: 50 TABLET, FILM COATED ORAL at 11:38

## 2025-04-11 RX ADMIN — PANTOPRAZOLE SODIUM 40 MG: 40 INJECTION, POWDER, LYOPHILIZED, FOR SOLUTION INTRAVENOUS at 11:35

## 2025-04-11 RX ADMIN — POTASSIUM CHLORIDE 40 MEQ: 1500 TABLET, EXTENDED RELEASE ORAL at 20:49

## 2025-04-11 RX ADMIN — TAMSULOSIN HYDROCHLORIDE 0.4 MG: 0.4 CAPSULE ORAL at 11:38

## 2025-04-11 RX ADMIN — ASPIRIN 81 MG: 81 TABLET, COATED ORAL at 20:56

## 2025-04-11 RX ADMIN — SODIUM CHLORIDE, PRESERVATIVE FREE 10 ML: 5 INJECTION INTRAVENOUS at 20:56

## 2025-04-11 RX ADMIN — FINASTERIDE 5 MG: 5 TABLET, FILM COATED ORAL at 11:38

## 2025-04-11 RX ADMIN — MEMANTINE 5 MG: 10 TABLET ORAL at 20:49

## 2025-04-11 RX ADMIN — MEMANTINE 5 MG: 10 TABLET ORAL at 11:37

## 2025-04-11 RX ADMIN — AMLODIPINE BESYLATE 5 MG: 5 TABLET ORAL at 20:49

## 2025-04-11 RX ADMIN — PIPERACILLIN AND TAZOBACTAM 3375 MG: 3; .375 INJECTION, POWDER, LYOPHILIZED, FOR SOLUTION INTRAVENOUS at 01:38

## 2025-04-11 RX ADMIN — BUDESONIDE 500 MCG: 0.5 INHALANT RESPIRATORY (INHALATION) at 19:44

## 2025-04-11 RX ADMIN — POLYETHYLENE GLYCOL 3350 17 G: 17 POWDER, FOR SOLUTION ORAL at 11:38

## 2025-04-11 RX ADMIN — ATORVASTATIN CALCIUM 20 MG: 20 TABLET, FILM COATED ORAL at 20:49

## 2025-04-11 RX ADMIN — DONEPEZIL HYDROCHLORIDE 10 MG: 5 TABLET, FILM COATED ORAL at 20:48

## 2025-04-11 RX ADMIN — PIPERACILLIN AND TAZOBACTAM 3375 MG: 3; .375 INJECTION, POWDER, LYOPHILIZED, FOR SOLUTION INTRAVENOUS at 11:59

## 2025-04-11 RX ADMIN — QUETIAPINE FUMARATE 25 MG: 25 TABLET ORAL at 17:22

## 2025-04-11 RX ADMIN — SODIUM CHLORIDE, PRESERVATIVE FREE 10 ML: 5 INJECTION INTRAVENOUS at 11:38

## 2025-04-11 RX ADMIN — POTASSIUM CHLORIDE 40 MEQ: 1500 TABLET, EXTENDED RELEASE ORAL at 11:37

## 2025-04-11 RX ADMIN — IPRATROPIUM BROMIDE AND ALBUTEROL SULFATE 1 DOSE: 2.5; .5 SOLUTION RESPIRATORY (INHALATION) at 19:44

## 2025-04-11 RX ADMIN — PIPERACILLIN AND TAZOBACTAM 3375 MG: 3; .375 INJECTION, POWDER, LYOPHILIZED, FOR SOLUTION INTRAVENOUS at 20:48

## 2025-04-11 RX ADMIN — TROSPIUM CHLORIDE 20 MG: 20 TABLET, FILM COATED ORAL at 20:48

## 2025-04-11 RX ADMIN — BUDESONIDE 500 MCG: 0.5 INHALANT RESPIRATORY (INHALATION) at 07:59

## 2025-04-11 RX ADMIN — FUROSEMIDE 40 MG: 10 INJECTION, SOLUTION INTRAMUSCULAR; INTRAVENOUS at 17:22

## 2025-04-11 RX ADMIN — FUROSEMIDE 40 MG: 10 INJECTION, SOLUTION INTRAMUSCULAR; INTRAVENOUS at 11:35

## 2025-04-11 RX ADMIN — MELATONIN 6 MG: at 20:49

## 2025-04-11 RX ADMIN — ENOXAPARIN SODIUM 40 MG: 100 INJECTION SUBCUTANEOUS at 11:38

## 2025-04-11 RX ADMIN — CARBIDOPA AND LEVODOPA 1 TABLET: 25; 100 TABLET, EXTENDED RELEASE ORAL at 11:37

## 2025-04-11 ASSESSMENT — PAIN SCALES - GENERAL: PAINLEVEL_OUTOF10: 0

## 2025-04-11 NOTE — PLAN OF CARE
Problem: Discharge Planning  Goal: Discharge to home or other facility with appropriate resources  Outcome: Progressing     Problem: Skin/Tissue Integrity  Goal: Skin integrity remains intact  Description: 1.  Monitor for areas of redness and/or skin breakdown  2.  Assess vascular access sites hourly  3.  Every 4-6 hours minimum:  Change oxygen saturation probe site  4.  Every 4-6 hours:  If on nasal continuous positive airway pressure, respiratory therapy assess nares and determine need for appliance change or resting period  Outcome: Progressing     Problem: Safety - Adult  Goal: Free from fall injury  Outcome: Progressing     Problem: Neurosensory - Adult  Goal: Achieves stable or improved neurological status  Outcome: Progressing  Goal: Achieves maximal functionality and self care  Outcome: Progressing     Problem: Respiratory - Adult  Goal: Achieves optimal ventilation and oxygenation  4/11/2025 0156 by Koko Ma RN  Outcome: Progressing  4/10/2025 2051 by Laure Ramesh RCP  Outcome: Progressing     Problem: Cardiovascular - Adult  Goal: Maintains optimal cardiac output and hemodynamic stability  Outcome: Progressing  Goal: Absence of cardiac dysrhythmias or at baseline  Outcome: Progressing     Problem: Skin/Tissue Integrity - Adult  Goal: Skin integrity remains intact  Description: 1.  Monitor for areas of redness and/or skin breakdown  2.  Assess vascular access sites hourly  3.  Every 4-6 hours minimum:  Change oxygen saturation probe site  4.  Every 4-6 hours:  If on nasal continuous positive airway pressure, respiratory therapy assess nares and determine need for appliance change or resting period  Outcome: Progressing  Goal: Incisions, wounds, or drain sites healing without S/S of infection  Outcome: Progressing  Goal: Oral mucous membranes remain intact  Outcome: Progressing     Problem: Musculoskeletal - Adult  Goal: Return mobility to safest level of function  Outcome: Progressing  Goal:

## 2025-04-11 NOTE — PLAN OF CARE
Problem: Discharge Planning  Goal: Discharge to home or other facility with appropriate resources  Outcome: Progressing     Problem: Skin/Tissue Integrity  Goal: Skin integrity remains intact  Description: 1.  Monitor for areas of redness and/or skin breakdown  2.  Assess vascular access sites hourly  3.  Every 4-6 hours minimum:  Change oxygen saturation probe site  4.  Every 4-6 hours:  If on nasal continuous positive airway pressure, respiratory therapy assess nares and determine need for appliance change or resting period  Outcome: Progressing     Problem: Safety - Adult  Goal: Free from fall injury  Outcome: Progressing     Problem: Neurosensory - Adult  Goal: Achieves stable or improved neurological status  Outcome: Progressing     Problem: Respiratory - Adult  Goal: Achieves optimal ventilation and oxygenation  4/11/2025 0801 by Josh Marquis, RT  Outcome: Progressing     Problem: Skin/Tissue Integrity - Adult  Goal: Skin integrity remains intact  Description: 1.  Monitor for areas of redness and/or skin breakdown  2.  Assess vascular access sites hourly  3.  Every 4-6 hours minimum:  Change oxygen saturation probe site  4.  Every 4-6 hours:  If on nasal continuous positive airway pressure, respiratory therapy assess nares and determine need for appliance change or resting period  Outcome: Progressing

## 2025-04-11 NOTE — PLAN OF CARE
Problem: Respiratory - Adult  Goal: Achieves optimal ventilation and oxygenation  4/10/2025 2051 by Laure Ramesh RCP  Outcome: Progressing

## 2025-04-11 NOTE — PROGRESS NOTES
End of Shift Note    Bedside shift change report given to BAR Suárez (oncoming nurse) by Koko Ma RN (offgoing nurse).  Report included the following information SBAR, Kardex, Intake/Output, MAR, Recent Results, and Quality Measures    Shift worked:  7P-7A     Shift summary and any significant changes:    Scheduled medication given.Pt still dyspneic on exertion.Pt oriented  X 4 and less impulsive.            Zone phone for oncoming shift:          Activity:  Level of Assistance: Standby assist, set-up cues, supervision of patient - no hands on  Number times ambulated in hallways past shift: 0  Number of times OOB to chair past shift: 0    Cardiac:   Cardiac Monitoring: Yes      Cardiac Rhythm: Sinus tachy    Access:  Current line(s): PIV     Genitourinary:   Urinary Status: Voiding, External catheter    Respiratory:   O2 Device: Nasal cannula  Chronic home O2 use?: YES  Incentive spirometer at bedside: NO    GI:  Last BM (including prior to admit): 04/09/25  Current diet:  ADULT DIET; Regular; GI [place] (GERD/Peptic Ulcer)  Passing flatus: YES    Pain Management:   Patient states pain is manageable on current regimen: N/A    Skin:  Krishna Scale Score: 18  Interventions: Wound Offloading (Prevention Methods): Pillows, Repositioning    Patient Safety:  Fall Risk: Nursing Judgement-Fall Risk High(Add Comments): Yes  Fall Risk Interventions  Nursing Judgement-Fall Risk High(Add Comments): Yes  Toilet Every 2 Hours-In Advance of Need: Yes  Hourly Visual Checks: In bed, Eyes closed  Fall Visual Posted: Fall sign posted, Socks  Room Door Open: Yes  Alarm On: Bed, Chair  Patient Moved Closer to Nursing Station: No    Active Consults:   IP CONSULT TO GI    Length of Stay:  Expected LOS: 7  Actual LOS: 4    Koko Ma RN

## 2025-04-12 LAB
ANION GAP SERPL CALC-SCNC: 6 MMOL/L (ref 2–12)
BASOPHILS # BLD: 0.03 K/UL (ref 0–0.1)
BASOPHILS NFR BLD: 0.2 % (ref 0–1)
BUN SERPL-MCNC: 27 MG/DL (ref 6–20)
BUN/CREAT SERPL: 23 (ref 12–20)
CALCIUM SERPL-MCNC: 8.4 MG/DL (ref 8.5–10.1)
CHLORIDE SERPL-SCNC: 101 MMOL/L (ref 97–108)
CO2 SERPL-SCNC: 27 MMOL/L (ref 21–32)
CREAT SERPL-MCNC: 1.2 MG/DL (ref 0.7–1.3)
D DIMER PPP FEU-MCNC: 2.15 MG/L FEU (ref 0–0.65)
DIFFERENTIAL METHOD BLD: ABNORMAL
EOSINOPHIL # BLD: 0.04 K/UL (ref 0–0.4)
EOSINOPHIL NFR BLD: 0.3 % (ref 0–7)
ERYTHROCYTE [DISTWIDTH] IN BLOOD BY AUTOMATED COUNT: 16.3 % (ref 11.5–14.5)
GLUCOSE SERPL-MCNC: 102 MG/DL (ref 65–100)
HCT VFR BLD AUTO: 30.7 % (ref 36.6–50.3)
HGB BLD-MCNC: 10 G/DL (ref 12.1–17)
IMM GRANULOCYTES # BLD AUTO: 0.08 K/UL (ref 0–0.04)
IMM GRANULOCYTES NFR BLD AUTO: 0.6 % (ref 0–0.5)
LYMPHOCYTES # BLD: 0.67 K/UL (ref 0.8–3.5)
LYMPHOCYTES NFR BLD: 4.7 % (ref 12–49)
MCH RBC QN AUTO: 26.9 PG (ref 26–34)
MCHC RBC AUTO-ENTMCNC: 32.6 G/DL (ref 30–36.5)
MCV RBC AUTO: 82.5 FL (ref 80–99)
MONOCYTES # BLD: 1.52 K/UL (ref 0–1)
MONOCYTES NFR BLD: 10.7 % (ref 5–13)
NEUTS SEG # BLD: 11.8 K/UL (ref 1.8–8)
NEUTS SEG NFR BLD: 83.5 % (ref 32–75)
NRBC # BLD: 0 K/UL (ref 0–0.01)
NRBC BLD-RTO: 0 PER 100 WBC
PLATELET # BLD AUTO: 315 K/UL (ref 150–400)
PMV BLD AUTO: 10.5 FL (ref 8.9–12.9)
POTASSIUM SERPL-SCNC: 3.3 MMOL/L (ref 3.5–5.1)
RBC # BLD AUTO: 3.72 M/UL (ref 4.1–5.7)
SODIUM SERPL-SCNC: 134 MMOL/L (ref 136–145)
WBC # BLD AUTO: 14.1 K/UL (ref 4.1–11.1)

## 2025-04-12 PROCEDURE — 2580000003 HC RX 258: Performed by: INTERNAL MEDICINE

## 2025-04-12 PROCEDURE — 94640 AIRWAY INHALATION TREATMENT: CPT

## 2025-04-12 PROCEDURE — 2500000003 HC RX 250 WO HCPCS: Performed by: INTERNAL MEDICINE

## 2025-04-12 PROCEDURE — 36415 COLL VENOUS BLD VENIPUNCTURE: CPT

## 2025-04-12 PROCEDURE — 85025 COMPLETE CBC W/AUTO DIFF WBC: CPT

## 2025-04-12 PROCEDURE — 1100000003 HC PRIVATE W/ TELEMETRY

## 2025-04-12 PROCEDURE — 6360000002 HC RX W HCPCS: Performed by: INTERNAL MEDICINE

## 2025-04-12 PROCEDURE — 80048 BASIC METABOLIC PNL TOTAL CA: CPT

## 2025-04-12 PROCEDURE — 85379 FIBRIN DEGRADATION QUANT: CPT

## 2025-04-12 PROCEDURE — 2700000000 HC OXYGEN THERAPY PER DAY

## 2025-04-12 PROCEDURE — 6370000000 HC RX 637 (ALT 250 FOR IP): Performed by: INTERNAL MEDICINE

## 2025-04-12 RX ADMIN — TROSPIUM CHLORIDE 20 MG: 20 TABLET, FILM COATED ORAL at 21:29

## 2025-04-12 RX ADMIN — FUROSEMIDE 40 MG: 10 INJECTION, SOLUTION INTRAMUSCULAR; INTRAVENOUS at 10:29

## 2025-04-12 RX ADMIN — BUDESONIDE 500 MCG: 0.5 INHALANT RESPIRATORY (INHALATION) at 20:38

## 2025-04-12 RX ADMIN — PIPERACILLIN AND TAZOBACTAM 3375 MG: 3; .375 INJECTION, POWDER, LYOPHILIZED, FOR SOLUTION INTRAVENOUS at 04:06

## 2025-04-12 RX ADMIN — MEMANTINE 5 MG: 10 TABLET ORAL at 10:29

## 2025-04-12 RX ADMIN — ASPIRIN 81 MG: 81 TABLET, COATED ORAL at 21:29

## 2025-04-12 RX ADMIN — POTASSIUM CHLORIDE 40 MEQ: 1500 TABLET, EXTENDED RELEASE ORAL at 10:28

## 2025-04-12 RX ADMIN — PIPERACILLIN AND TAZOBACTAM 3375 MG: 3; .375 INJECTION, POWDER, LYOPHILIZED, FOR SOLUTION INTRAVENOUS at 21:29

## 2025-04-12 RX ADMIN — POLYETHYLENE GLYCOL 3350 17 G: 17 POWDER, FOR SOLUTION ORAL at 10:29

## 2025-04-12 RX ADMIN — DONEPEZIL HYDROCHLORIDE 10 MG: 5 TABLET, FILM COATED ORAL at 21:30

## 2025-04-12 RX ADMIN — PIPERACILLIN AND TAZOBACTAM 3375 MG: 3; .375 INJECTION, POWDER, LYOPHILIZED, FOR SOLUTION INTRAVENOUS at 13:30

## 2025-04-12 RX ADMIN — MELATONIN 6 MG: at 21:30

## 2025-04-12 RX ADMIN — PANTOPRAZOLE SODIUM 40 MG: 40 INJECTION, POWDER, LYOPHILIZED, FOR SOLUTION INTRAVENOUS at 10:30

## 2025-04-12 RX ADMIN — SERTRALINE 50 MG: 50 TABLET, FILM COATED ORAL at 10:29

## 2025-04-12 RX ADMIN — ATORVASTATIN CALCIUM 20 MG: 20 TABLET, FILM COATED ORAL at 21:30

## 2025-04-12 RX ADMIN — MEMANTINE 5 MG: 10 TABLET ORAL at 21:29

## 2025-04-12 RX ADMIN — SODIUM CHLORIDE, PRESERVATIVE FREE 10 ML: 5 INJECTION INTRAVENOUS at 10:30

## 2025-04-12 RX ADMIN — CARBIDOPA AND LEVODOPA 1 TABLET: 25; 100 TABLET, EXTENDED RELEASE ORAL at 21:30

## 2025-04-12 RX ADMIN — BUDESONIDE 500 MCG: 0.5 INHALANT RESPIRATORY (INHALATION) at 07:17

## 2025-04-12 RX ADMIN — TAMSULOSIN HYDROCHLORIDE 0.4 MG: 0.4 CAPSULE ORAL at 10:28

## 2025-04-12 RX ADMIN — QUETIAPINE FUMARATE 25 MG: 25 TABLET ORAL at 20:03

## 2025-04-12 RX ADMIN — FUROSEMIDE 40 MG: 10 INJECTION, SOLUTION INTRAMUSCULAR; INTRAVENOUS at 20:03

## 2025-04-12 RX ADMIN — ENOXAPARIN SODIUM 40 MG: 100 INJECTION SUBCUTANEOUS at 10:29

## 2025-04-12 RX ADMIN — AMLODIPINE BESYLATE 5 MG: 5 TABLET ORAL at 21:30

## 2025-04-12 RX ADMIN — CARBIDOPA AND LEVODOPA 1 TABLET: 25; 100 TABLET, EXTENDED RELEASE ORAL at 10:29

## 2025-04-12 RX ADMIN — POTASSIUM CHLORIDE 40 MEQ: 1500 TABLET, EXTENDED RELEASE ORAL at 21:29

## 2025-04-12 RX ADMIN — AZITHROMYCIN 500 MG: 250 TABLET, FILM COATED ORAL at 10:29

## 2025-04-12 RX ADMIN — FINASTERIDE 5 MG: 5 TABLET, FILM COATED ORAL at 10:28

## 2025-04-12 RX ADMIN — SODIUM CHLORIDE, PRESERVATIVE FREE 10 ML: 5 INJECTION INTRAVENOUS at 21:30

## 2025-04-12 ASSESSMENT — PAIN SCALES - GENERAL
PAINLEVEL_OUTOF10: 0
PAINLEVEL_OUTOF10: 0

## 2025-04-12 NOTE — PROGRESS NOTES
End of Shift Note    Bedside shift change report given to BAR Milligan  (oncoming nurse) by Kamini Acevedo RN (offgoing nurse).  Report included the following information SBAR, Kardex, ED Summary, Procedure Summary, Intake/Output, MAR, Accordion, Recent Results, Med Rec Status, Cardiac Rhythm Sinus Tachu, Alarm Parameters , and Quality Measures    Shift worked:  7a-7p     Shift summary and any significant changes:     1335 Patient Tachypneic. Provider informed. No new orders made at this time.   1639 Patient Tachyneic with increased work of breathing provider informed. He came to assess patient at bedside. Chest xray ordered.      Concerns for physician to address:  Tachypnea and increased work of breathing     Zone phone for oncoming shift:   [phone]       Activity:  Level of Assistance: Standby assist, set-up cues, supervision of patient - no hands on  Number times ambulated in hallways past shift: 0  Number of times OOB to chair past shift: 2    Cardiac:   Cardiac Monitoring: Yes      Cardiac Rhythm: Sinus tachy    Access:  Current line(s): PIV     Genitourinary:   Urinary Status: Voiding (urinal)    Respiratory:   O2 Device: Nasal cannula  Chronic home O2 use?: YES  Incentive spirometer at bedside: YES    GI:  Last BM (including prior to admit): 04/09/25  Current diet:  ADULT DIET; Regular; GI [place] (GERD/Peptic Ulcer)  Passing flatus: YES    Pain Management:   Patient states pain is manageable on current regimen: YES    Skin:  Krishna Scale Score: 18  Interventions: Wound Offloading (Prevention Methods): Pillows, Repositioning    Patient Safety:  Fall Risk: Nursing Judgement-Fall Risk High(Add Comments): Yes  Fall Risk Interventions  Nursing Judgement-Fall Risk High(Add Comments): Yes  Toilet Every 2 Hours-In Advance of Need: Yes  Hourly Visual Checks: In bed, Eyes closed  Fall Visual Posted: Fall sign posted, Socks  Room Door Open: Yes  Alarm On: Bed, Chair  Patient Moved Closer to Nursing Station:

## 2025-04-12 NOTE — PLAN OF CARE
Problem: Discharge Planning  Goal: Discharge to home or other facility with appropriate resources  Outcome: Progressing     Problem: Skin/Tissue Integrity  Goal: Skin integrity remains intact  Description: 1.  Monitor for areas of redness and/or skin breakdown  2.  Assess vascular access sites hourly  3.  Every 4-6 hours minimum:  Change oxygen saturation probe site  4.  Every 4-6 hours:  If on nasal continuous positive airway pressure, respiratory therapy assess nares and determine need for appliance change or resting period  Outcome: Progressing     Problem: Safety - Adult  Goal: Free from fall injury  Outcome: Progressing     Problem: Respiratory - Adult  Goal: Achieves optimal ventilation and oxygenation  4/12/2025 0740 by Jada Clancy, RT  Outcome: Progressing     Problem: Skin/Tissue Integrity - Adult  Goal: Skin integrity remains intact  Description: 1.  Monitor for areas of redness and/or skin breakdown  2.  Assess vascular access sites hourly  3.  Every 4-6 hours minimum:  Change oxygen saturation probe site  4.  Every 4-6 hours:  If on nasal continuous positive airway pressure, respiratory therapy assess nares and determine need for appliance change or resting period  Outcome: Progressing

## 2025-04-12 NOTE — PROGRESS NOTES
End of Shift Note    Bedside shift change report given to Gil DINERO (oncoming nurse) by Srini Mcdaniels RN (offgoing nurse).  Report included the following information SBAR, Kardex, Intake/Output, MAR, Recent Results, and Quality Measures    Shift worked:  2495-6411     Shift summary and any significant changes:     Seen patient on bed, Tachypneic, no desaturation, with oxygen support via nasal cannula at 4LPM. Due medications given. Hourly rounding done. Patient needs attended, Kept comfortable.      Concerns for physician to address:  Tachypnea     Zone phone for oncoming shift:          Activity:  Level of Assistance: Standby assist, set-up cues, supervision of patient - no hands on  Number times ambulated in hallways past shift: 0  Number of times OOB to chair past shift: 0    Cardiac:   Cardiac Monitoring: Yes      Cardiac Rhythm: Sinus tachy    Access:  Current line(s): PIV     Genitourinary:   Urinary Status: Voiding, External catheter    Respiratory:   O2 Device: Nasal cannula  Chronic home O2 use?: NO  Incentive spirometer at bedside: NO    GI:  Last BM (including prior to admit): 04/09/25  Current diet:  ADULT DIET; Regular; GI [place] (GERD/Peptic Ulcer)  Passing flatus: YES    Pain Management:   Patient states pain is manageable on current regimen: YES    Skin:  Krishna Scale Score: 18  Interventions: Wound Offloading (Prevention Methods): Pillows, Repositioning, Turning    Patient Safety:  Fall Risk: Nursing Judgement-Fall Risk High(Add Comments): Yes  Fall Risk Interventions  Nursing Judgement-Fall Risk High(Add Comments): Yes  Toilet Every 2 Hours-In Advance of Need: Yes  Hourly Visual Checks: In bed, Eyes closed  Fall Visual Posted: Fall sign posted, Socks  Room Door Open: Yes  Alarm On: Bed, Chair  Patient Moved Closer to Nursing Station: No    Active Consults:   IP CONSULT TO GI    Length of Stay:  Expected LOS: 7  Actual LOS: 5    Srini Mcdaniels, RN

## 2025-04-12 NOTE — PROGRESS NOTES
Physician Progress Note      PATIENT:               DWIGHT HOLLAND  CSN #:                  [id number]  :                       1945  ADMIT DATE:       2025 12:35 PM  DISCH DATE:  RESPONDING  PROVIDER #:        Kris Nino MD          QUERY TEXT:    Pneumonia is documented in the medical record most recently 4/10/25 IM PN (JUAN Nino). Please specify the type of pneumonia and the causative organism:    The clinical indicators include:  -  IM PN (JESUS Zamudio) notes: \"concerns of aspiration\"  - noted h/o dysphagia with 4/10 IM PN (JUAN Nino) noting \"intermittent   dysphagia\"  - antibiotic switched to Zosyn per 4/10 IM PN and MAR  Options provided:  -- Aspiration pneumonia  -- Pneumonia, unspecified  -- Other - I will add my own diagnosis  -- Disagree - Not applicable / Not valid  -- Disagree - Clinically unable to determine / Unknown  -- Refer to Clinical Documentation Reviewer    PROVIDER RESPONSE TEXT:    This patient has aspiration pneumonia.    Query created by: Milly Garay on 2025 1:29 PM      Electronically signed by:  Kris Nino MD 2025 12:39 AM

## 2025-04-12 NOTE — PLAN OF CARE
Problem: Respiratory - Adult  Goal: Achieves optimal ventilation and oxygenation  4/11/2025 2056 by Laure Ramesh RCP  Outcome: Progressing

## 2025-04-12 NOTE — PROGRESS NOTES
Hospitalist Progress Note    NAME:   Jonatan Rausch Jr.   : 1945   MRN: [id number]     Date/Time: 2025 11:49 PM  Patient PCP: Kenny Barksdale MD    Estimated discharge date:  Barriers:       Assessment / Plan:  Acute hypoxic respiratory failure  - CT showed multifocal pneumonia, patient has leukocytosis, however procalcitonin level is very low  -CT showed bilateral pleural effusion, and BNP of 13,000  - Started on Lasix 40 mg twice daily, switch Rocephin to Zosyn  -Echo pending    Intermittent dysphagia  - Consulted with GI  -Barium swallow 24  -There is no evidence of esophageal stricture or significant esophageal dysmotility.  - Given patient suffering from pneumonia and dysphagia is intermittent without pain, recommend outpatient follow-up    History of Parkinson's disease  Dementia  --Wife reports progression of disease  -- Continue Sinemet  -- Continue Aricept and Namenda  PT OT evaluation once cleared by GI     Essential hypertension  Hyperlipidemia  --Fairly stable     History of coronary artery disease status post CABG  -- Fairly stable        Code status:Full    Medical Decision Making:   I personally reviewed labs:  I personally reviewed imaging:  I personally reviewed EKG:  Toxic drug monitoring:   Discussed case with:         Code Status:   DVT Prophylaxis:   GI Prophylaxis:    Subjective:     Chief Complaint / Reason for Physician Visit   Discussed with RN events overnight.       Objective:     VITALS:   Last 24hrs VS reviewed since prior progress note. Most recent are:  Patient Vitals for the past 24 hrs:   BP Temp Temp src Pulse Resp SpO2   259 110/79 -- -- 92 24 94 %   25 1939 109/79 98.2 °F (36.8 °C) -- (!) 106 22 95 %   25 1459 (!) 110/93 97.5 °F (36.4 °C) Oral 50 -- 92 %   25 1415 -- -- -- -- (!) 32 92 %   25 0823 115/81 97.9 °F (36.6 °C) Oral (!) 102 -- 93 %   25 0800 -- -- -- -- -- 94 %   25 0256 112/64 98.4 °F (36.9 °C) Oral 96

## 2025-04-13 ENCOUNTER — APPOINTMENT (OUTPATIENT)
Facility: HOSPITAL | Age: 80
DRG: 871 | End: 2025-04-13
Payer: MEDICARE

## 2025-04-13 LAB
ANION GAP SERPL CALC-SCNC: 5 MMOL/L (ref 2–12)
B PERT DNA SPEC QL NAA+PROBE: NOT DETECTED
BACTERIA SPEC CULT: NORMAL
BACTERIA SPEC CULT: NORMAL
BASOPHILS # BLD: 0.03 K/UL (ref 0–0.1)
BASOPHILS NFR BLD: 0.2 % (ref 0–1)
BORDETELLA PARAPERTUSSIS BY PCR: NOT DETECTED
BUN SERPL-MCNC: 29 MG/DL (ref 6–20)
BUN/CREAT SERPL: 27 (ref 12–20)
C PNEUM DNA SPEC QL NAA+PROBE: NOT DETECTED
CALCIUM SERPL-MCNC: 8.5 MG/DL (ref 8.5–10.1)
CHLORIDE SERPL-SCNC: 102 MMOL/L (ref 97–108)
CO2 SERPL-SCNC: 29 MMOL/L (ref 21–32)
CREAT SERPL-MCNC: 1.07 MG/DL (ref 0.7–1.3)
DIFFERENTIAL METHOD BLD: ABNORMAL
EOSINOPHIL # BLD: 0.02 K/UL (ref 0–0.4)
EOSINOPHIL NFR BLD: 0.1 % (ref 0–7)
ERYTHROCYTE [DISTWIDTH] IN BLOOD BY AUTOMATED COUNT: 16.3 % (ref 11.5–14.5)
FLUAV SUBTYP SPEC NAA+PROBE: NOT DETECTED
FLUBV RNA SPEC QL NAA+PROBE: NOT DETECTED
GLUCOSE SERPL-MCNC: 110 MG/DL (ref 65–100)
HADV DNA SPEC QL NAA+PROBE: NOT DETECTED
HCOV 229E RNA SPEC QL NAA+PROBE: NOT DETECTED
HCOV HKU1 RNA SPEC QL NAA+PROBE: NOT DETECTED
HCOV NL63 RNA SPEC QL NAA+PROBE: NOT DETECTED
HCOV OC43 RNA SPEC QL NAA+PROBE: NOT DETECTED
HCT VFR BLD AUTO: 31.3 % (ref 36.6–50.3)
HGB BLD-MCNC: 10 G/DL (ref 12.1–17)
HMPV RNA SPEC QL NAA+PROBE: NOT DETECTED
HPIV1 RNA SPEC QL NAA+PROBE: NOT DETECTED
HPIV2 RNA SPEC QL NAA+PROBE: NOT DETECTED
HPIV3 RNA SPEC QL NAA+PROBE: NOT DETECTED
HPIV4 RNA SPEC QL NAA+PROBE: NOT DETECTED
IMM GRANULOCYTES # BLD AUTO: 0.09 K/UL (ref 0–0.04)
IMM GRANULOCYTES NFR BLD AUTO: 0.6 % (ref 0–0.5)
LYMPHOCYTES # BLD: 0.46 K/UL (ref 0.8–3.5)
LYMPHOCYTES NFR BLD: 3.2 % (ref 12–49)
M PNEUMO DNA SPEC QL NAA+PROBE: NOT DETECTED
MCH RBC QN AUTO: 26.5 PG (ref 26–34)
MCHC RBC AUTO-ENTMCNC: 31.9 G/DL (ref 30–36.5)
MCV RBC AUTO: 83 FL (ref 80–99)
MONOCYTES # BLD: 1.25 K/UL (ref 0–1)
MONOCYTES NFR BLD: 8.7 % (ref 5–13)
NEUTS SEG # BLD: 12.5 K/UL (ref 1.8–8)
NEUTS SEG NFR BLD: 87.2 % (ref 32–75)
NRBC # BLD: 0 K/UL (ref 0–0.01)
NRBC BLD-RTO: 0 PER 100 WBC
PLATELET # BLD AUTO: 292 K/UL (ref 150–400)
PMV BLD AUTO: 10.4 FL (ref 8.9–12.9)
POTASSIUM SERPL-SCNC: 3.5 MMOL/L (ref 3.5–5.1)
RBC # BLD AUTO: 3.77 M/UL (ref 4.1–5.7)
RSV RNA SPEC QL NAA+PROBE: NOT DETECTED
RV+EV RNA SPEC QL NAA+PROBE: NOT DETECTED
SARS-COV-2 RNA RESP QL NAA+PROBE: NOT DETECTED
SERVICE CMNT-IMP: NORMAL
SERVICE CMNT-IMP: NORMAL
SODIUM SERPL-SCNC: 136 MMOL/L (ref 136–145)
WBC # BLD AUTO: 14.4 K/UL (ref 4.1–11.1)

## 2025-04-13 PROCEDURE — 85025 COMPLETE CBC W/AUTO DIFF WBC: CPT

## 2025-04-13 PROCEDURE — 6370000000 HC RX 637 (ALT 250 FOR IP): Performed by: INTERNAL MEDICINE

## 2025-04-13 PROCEDURE — 6360000002 HC RX W HCPCS: Performed by: HOSPITALIST

## 2025-04-13 PROCEDURE — 2580000003 HC RX 258: Performed by: INTERNAL MEDICINE

## 2025-04-13 PROCEDURE — 6360000002 HC RX W HCPCS: Performed by: INTERNAL MEDICINE

## 2025-04-13 PROCEDURE — 0202U NFCT DS 22 TRGT SARS-COV-2: CPT

## 2025-04-13 PROCEDURE — 2500000003 HC RX 250 WO HCPCS: Performed by: INTERNAL MEDICINE

## 2025-04-13 PROCEDURE — 71250 CT THORAX DX C-: CPT

## 2025-04-13 PROCEDURE — 94761 N-INVAS EAR/PLS OXIMETRY MLT: CPT

## 2025-04-13 PROCEDURE — 1100000003 HC PRIVATE W/ TELEMETRY

## 2025-04-13 PROCEDURE — 2500000003 HC RX 250 WO HCPCS: Performed by: HOSPITALIST

## 2025-04-13 PROCEDURE — 2700000000 HC OXYGEN THERAPY PER DAY

## 2025-04-13 PROCEDURE — 94640 AIRWAY INHALATION TREATMENT: CPT

## 2025-04-13 PROCEDURE — 36415 COLL VENOUS BLD VENIPUNCTURE: CPT

## 2025-04-13 PROCEDURE — 80048 BASIC METABOLIC PNL TOTAL CA: CPT

## 2025-04-13 RX ORDER — FUROSEMIDE 10 MG/ML
60 INJECTION INTRAMUSCULAR; INTRAVENOUS EVERY 6 HOURS
Status: DISCONTINUED | OUTPATIENT
Start: 2025-04-13 | End: 2025-04-14

## 2025-04-13 RX ORDER — IPRATROPIUM BROMIDE AND ALBUTEROL SULFATE 2.5; .5 MG/3ML; MG/3ML
1 SOLUTION RESPIRATORY (INHALATION)
Status: COMPLETED | OUTPATIENT
Start: 2025-04-13 | End: 2025-04-15

## 2025-04-13 RX ORDER — PANTOPRAZOLE SODIUM 40 MG/1
40 TABLET, DELAYED RELEASE ORAL
Status: DISCONTINUED | OUTPATIENT
Start: 2025-04-14 | End: 2025-04-20 | Stop reason: HOSPADM

## 2025-04-13 RX ADMIN — FUROSEMIDE 60 MG: 10 INJECTION, SOLUTION INTRAMUSCULAR; INTRAVENOUS at 19:02

## 2025-04-13 RX ADMIN — SODIUM CHLORIDE, PRESERVATIVE FREE 10 ML: 5 INJECTION INTRAVENOUS at 11:59

## 2025-04-13 RX ADMIN — BUDESONIDE 500 MCG: 0.5 INHALANT RESPIRATORY (INHALATION) at 21:13

## 2025-04-13 RX ADMIN — MEMANTINE 5 MG: 10 TABLET ORAL at 11:58

## 2025-04-13 RX ADMIN — SODIUM CHLORIDE, PRESERVATIVE FREE 10 ML: 5 INJECTION INTRAVENOUS at 20:55

## 2025-04-13 RX ADMIN — POLYETHYLENE GLYCOL 3350 17 G: 17 POWDER, FOR SOLUTION ORAL at 11:59

## 2025-04-13 RX ADMIN — FINASTERIDE 5 MG: 5 TABLET, FILM COATED ORAL at 11:59

## 2025-04-13 RX ADMIN — ENOXAPARIN SODIUM 40 MG: 100 INJECTION SUBCUTANEOUS at 11:58

## 2025-04-13 RX ADMIN — SERTRALINE 50 MG: 50 TABLET, FILM COATED ORAL at 11:59

## 2025-04-13 RX ADMIN — AZITHROMYCIN 500 MG: 250 TABLET, FILM COATED ORAL at 11:59

## 2025-04-13 RX ADMIN — TROSPIUM CHLORIDE 20 MG: 20 TABLET, FILM COATED ORAL at 20:54

## 2025-04-13 RX ADMIN — IPRATROPIUM BROMIDE AND ALBUTEROL SULFATE 1 DOSE: 2.5; .5 SOLUTION RESPIRATORY (INHALATION) at 21:08

## 2025-04-13 RX ADMIN — AMLODIPINE BESYLATE 5 MG: 5 TABLET ORAL at 20:55

## 2025-04-13 RX ADMIN — CHLOROTHIAZIDE SODIUM 500 MG: 500 INJECTION, POWDER, LYOPHILIZED, FOR SOLUTION INTRAVENOUS at 20:55

## 2025-04-13 RX ADMIN — CARBIDOPA AND LEVODOPA 1 TABLET: 25; 100 TABLET, EXTENDED RELEASE ORAL at 11:59

## 2025-04-13 RX ADMIN — PIPERACILLIN AND TAZOBACTAM 3375 MG: 3; .375 INJECTION, POWDER, LYOPHILIZED, FOR SOLUTION INTRAVENOUS at 12:34

## 2025-04-13 RX ADMIN — ASPIRIN 81 MG: 81 TABLET, COATED ORAL at 20:54

## 2025-04-13 RX ADMIN — POTASSIUM CHLORIDE 40 MEQ: 1500 TABLET, EXTENDED RELEASE ORAL at 20:54

## 2025-04-13 RX ADMIN — MEMANTINE 5 MG: 10 TABLET ORAL at 20:54

## 2025-04-13 RX ADMIN — IPRATROPIUM BROMIDE AND ALBUTEROL SULFATE 1 DOSE: 2.5; .5 SOLUTION RESPIRATORY (INHALATION) at 14:33

## 2025-04-13 RX ADMIN — DONEPEZIL HYDROCHLORIDE 10 MG: 5 TABLET, FILM COATED ORAL at 20:55

## 2025-04-13 RX ADMIN — FUROSEMIDE 40 MG: 10 INJECTION, SOLUTION INTRAMUSCULAR; INTRAVENOUS at 11:58

## 2025-04-13 RX ADMIN — BUDESONIDE 500 MCG: 0.5 INHALANT RESPIRATORY (INHALATION) at 07:06

## 2025-04-13 RX ADMIN — MELATONIN 6 MG: at 20:54

## 2025-04-13 RX ADMIN — CARBIDOPA AND LEVODOPA 1 TABLET: 25; 100 TABLET, EXTENDED RELEASE ORAL at 20:54

## 2025-04-13 RX ADMIN — PIPERACILLIN AND TAZOBACTAM 3375 MG: 3; .375 INJECTION, POWDER, LYOPHILIZED, FOR SOLUTION INTRAVENOUS at 21:01

## 2025-04-13 RX ADMIN — PANTOPRAZOLE SODIUM 40 MG: 40 INJECTION, POWDER, LYOPHILIZED, FOR SOLUTION INTRAVENOUS at 11:58

## 2025-04-13 RX ADMIN — ATORVASTATIN CALCIUM 20 MG: 20 TABLET, FILM COATED ORAL at 20:54

## 2025-04-13 RX ADMIN — POTASSIUM CHLORIDE 40 MEQ: 1500 TABLET, EXTENDED RELEASE ORAL at 11:59

## 2025-04-13 RX ADMIN — QUETIAPINE FUMARATE 25 MG: 25 TABLET ORAL at 19:03

## 2025-04-13 RX ADMIN — TAMSULOSIN HYDROCHLORIDE 0.4 MG: 0.4 CAPSULE ORAL at 11:58

## 2025-04-13 RX ADMIN — PIPERACILLIN AND TAZOBACTAM 3375 MG: 3; .375 INJECTION, POWDER, LYOPHILIZED, FOR SOLUTION INTRAVENOUS at 05:21

## 2025-04-13 ASSESSMENT — PAIN SCALES - GENERAL
PAINLEVEL_OUTOF10: 0
PAINLEVEL_OUTOF10: 0

## 2025-04-13 NOTE — PLAN OF CARE
Problem: Discharge Planning  Goal: Discharge to home or other facility with appropriate resources  4/13/2025 0134 by Andi Flores RN  Outcome: Progressing  4/12/2025 1243 by Kamini Acevedo RN  Outcome: Progressing     Problem: Skin/Tissue Integrity  Goal: Skin integrity remains intact  Description: 1.  Monitor for areas of redness and/or skin breakdown  2.  Assess vascular access sites hourly  3.  Every 4-6 hours minimum:  Change oxygen saturation probe site  4.  Every 4-6 hours:  If on nasal continuous positive airway pressure, respiratory therapy assess nares and determine need for appliance change or resting period  4/13/2025 0134 by Andi Flores RN  Outcome: Progressing  4/12/2025 1243 by Kamini Acevedo RN  Outcome: Progressing     Problem: Safety - Adult  Goal: Free from fall injury  4/13/2025 0134 by Andi Flores RN  Outcome: Progressing  4/12/2025 1243 by Kamini Acevedo RN  Outcome: Progressing     Problem: Neurosensory - Adult  Goal: Achieves stable or improved neurological status  Outcome: Progressing  Goal: Achieves maximal functionality and self care  Outcome: Progressing     Problem: Respiratory - Adult  Goal: Achieves optimal ventilation and oxygenation  4/13/2025 0134 by Andi Flores RN  Outcome: Progressing  4/12/2025 2037 by Belinda Mccarthy, RT  Outcome: Not Progressing     Problem: Cardiovascular - Adult  Goal: Maintains optimal cardiac output and hemodynamic stability  Outcome: Progressing  Goal: Absence of cardiac dysrhythmias or at baseline  Outcome: Progressing     Problem: Skin/Tissue Integrity - Adult  Goal: Skin integrity remains intact  Description: 1.  Monitor for areas of redness and/or skin breakdown  2.  Assess vascular access sites hourly  3.  Every 4-6 hours minimum:  Change oxygen saturation probe site  4.  Every 4-6 hours:  If on nasal continuous positive airway pressure, respiratory therapy assess nares and determine need for appliance

## 2025-04-13 NOTE — PLAN OF CARE
Problem: Respiratory - Adult  Goal: Achieves optimal ventilation and oxygenation  4/12/2025 2037 by Belinda Mccarthy, RT  Outcome: Not Progressing patient labored breathing  4/12/2025 0740 by Jada Clancy, RT  Outcome: Progressing

## 2025-04-13 NOTE — PROGRESS NOTES
Hospitalist Progress Note    NAME:   Jonatan Rausch Jr.   : 1945   MRN: [id number]     Date/Time: 2025 10:06 PM  Patient PCP: Kenny Barksdale MD    Estimated discharge date:  Barriers:       Assessment / Plan:  Acute hypoxic respiratory failure  - CT showed multifocal pneumonia, patient has leukocytosis, however procalcitonin level is very low  -CT showed bilateral pleural effusion, and BNP of 13,000  - Started on Lasix 40 mg twice daily, switch Rocephin to Zosyn  -Echo pending  -Still significantly hypoxic, repeat CT, may ask for pulmonary consult    Intermittent dysphagia  - Consulted with GI  -Barium swallow 24  -There is no evidence of esophageal stricture or significant esophageal dysmotility.  - Given patient suffering from pneumonia and dysphagia is intermittent without pain, recommend outpatient follow-up    History of Parkinson's disease  Dementia  --Wife reports progression of disease  -- Continue Sinemet  -- Continue Aricept and Namenda  PT OT evaluation once cleared by GI     Essential hypertension  Hyperlipidemia  --Fairly stable     History of coronary artery disease status post CABG  -- Fairly stable        Code status:Full    Medical Decision Making:   I personally reviewed labs:  I personally reviewed imaging:  I personally reviewed EKG:  Toxic drug monitoring:   Discussed case with:         Code Status:   DVT Prophylaxis:   GI Prophylaxis:    Subjective:     Chief Complaint / Reason for Physician Visit   Discussed with RN events overnight.       Objective:     VITALS:   Last 24hrs VS reviewed since prior progress note. Most recent are:  Patient Vitals for the past 24 hrs:   BP Temp Temp src Pulse Resp SpO2   25 2109 111/77 98.8 °F (37.1 °C) -- (!) 101 21 93 %   25 -- -- -- (!) 103 28 97 %   25 -- -- -- 51 28 91 %   25 1030 112/68 -- -- 98 -- 94 %   25 0820 100/73 98.4 °F (36.9 °C) Oral 94 28 93 %   25 0717 -- -- -- (!) 105 18 92 %

## 2025-04-13 NOTE — PLAN OF CARE
Problem: Discharge Planning  Goal: Discharge to home or other facility with appropriate resources  4/13/2025 0858 by Kamini Acevedo RN  Outcome: Progressing  4/13/2025 0134 by Andi Flores RN  Outcome: Progressing     Problem: Skin/Tissue Integrity  Goal: Skin integrity remains intact  Description: 1.  Monitor for areas of redness and/or skin breakdown  2.  Assess vascular access sites hourly  3.  Every 4-6 hours minimum:  Change oxygen saturation probe site  4.  Every 4-6 hours:  If on nasal continuous positive airway pressure, respiratory therapy assess nares and determine need for appliance change or resting period  4/13/2025 0858 by Kamini Acevedo RN  Outcome: Progressing  Flowsheets (Taken 4/13/2025 0858)  Skin Integrity Remains Intact:   Monitor for areas of redness and/or skin breakdown   Every 4-6 hours minimum:  Change oxygen saturation probe site   Turn and reposition as indicated   Every 4-6 hours:  If on nasal continuous positive airway pressure, assess nares and determine need for appliance change or resting period  4/13/2025 0134 by Andi Flores RN  Outcome: Progressing     Problem: Safety - Adult  Goal: Free from fall injury  4/13/2025 0858 by Kamini Acevedo RN  Outcome: Progressing  4/13/2025 0134 by Andi Flores RN  Outcome: Progressing     Problem: Neurosensory - Adult  Goal: Achieves stable or improved neurological status  4/13/2025 0858 by Kamini Acevedo RN  Outcome: Progressing  Flowsheets (Taken 4/13/2025 0858)  Achieves stable or improved neurological status: Assess for and report changes in neurological status  4/13/2025 0134 by Andi Flores RN  Outcome: Progressing  Goal: Achieves maximal functionality and self care  4/13/2025 0858 by Kamini Acevedo RN  Outcome: Progressing  4/13/2025 0134 by Andi Flores RN  Outcome: Progressing     Problem: Respiratory - Adult  Goal: Achieves optimal ventilation and oxygenation  4/13/2025 0858

## 2025-04-13 NOTE — PROGRESS NOTES
Hospitalist Progress Note    NAME:   Jonatan Rausch Jr.   : 1945   MRN: [id number]     Date/Time: 2025 4:33 PM  Patient PCP: Kenny Barksdale MD    Estimated discharge date:  Barriers:       Assessment / Plan:  Acute hypoxic respiratory failure  -Send patient is significant hypoxic on 5 L oxygen, despite being treated with IV Lasix for the last few days and broadened antibiotics spectrum to Zosyn and Zithromax, follow-up chest CT report is pending however on my review patient had extensive infiltrate in right lung  - Initial CT showed multifocal pneumonia, patient has leukocytosis, however procalcitonin level is very low  -Initially CT showed bilateral pleural effusion, and BNP of 13,000  - Started on Lasix 40 mg twice daily on 4/10 , switch Rocephin to Zosyn  on   -Echo pending  -Pulmonary consult    Intermittent dysphagia  - Consulted with GI  -Barium swallow 24  -There is no evidence of esophageal stricture or significant esophageal dysmotility.  - Given patient suffering from pneumonia and dysphagia is intermittent without pain, recommend outpatient follow-up    History of Parkinson's disease  Dementia  --Wife reports progression of disease  -- Continue Sinemet  -- Continue Aricept and Namenda  PT OT evaluation once cleared by GI     Essential hypertension  Hyperlipidemia  --Fairly stable     History of coronary artery disease status post CABG  -- Fairly stable        Code status:Full    Medical Decision Making:   I personally reviewed labs:  I personally reviewed imaging:  I personally reviewed EKG:  Toxic drug monitoring:   Discussed case with:         Code Status:   DVT Prophylaxis:   GI Prophylaxis:    Subjective:     Chief Complaint / Reason for Physician Visit   Discussed with RN events overnight.       Objective:     VITALS:   Last 24hrs VS reviewed since prior progress note. Most recent are:  Patient Vitals for the past 24 hrs:   BP Temp Temp src Pulse Resp SpO2 Weight   25

## 2025-04-13 NOTE — PROGRESS NOTES
End of Shift Note    Bedside shift change report given to BAR Suárez (oncoming nurse) by Andi Flores RN (offgoing nurse).  Report included the following information SBAR, Kardex, Intake/Output, MAR, Recent Results, Cardiac Rhythm Sinus Tachycardia, and Quality Measures    Shift worked:  6989-9258     Shift summary and any significant changes:     Pt had a calm  night. Remained on O2 @ 5L via nasal cannula. Tachypnea but with good O2 saturation ranging 93-95%. On semi to high douglass's position. Care rounds tolerated. No other concerns at the time of bedside shift report.      Concerns for physician to address:  none     Zone phone for oncoming shift:          Activity:  Level of Assistance: Moderate assist, patient does 50-74%  Number times ambulated in hallways past shift: 0  Number of times OOB to chair past shift: 1    Cardiac:   Cardiac Monitoring: Yes      Cardiac Rhythm: Sinus tachy    Access:  Current line(s): PIV     Genitourinary:   Urinary Status: Voiding, External catheter    Respiratory:   O2 Device: Nasal cannula  Chronic home O2 use?: YES  Incentive spirometer at bedside: YES    GI:  Last BM (including prior to admit): 04/09/25  Current diet:  ADULT DIET; Regular; GI [place] (GERD/Peptic Ulcer)  Passing flatus: YES    Pain Management:   Patient states pain is manageable on current regimen: N/A    Skin:  Krishna Scale Score: 17  Interventions: Wound Offloading (Prevention Methods): Repositioning, Pillows, Turning, Elevate heels    Patient Safety:  Fall Risk: Nursing Judgement-Fall Risk High(Add Comments): Yes  Fall Risk Interventions  Nursing Judgement-Fall Risk High(Add Comments): Yes  Toilet Every 2 Hours-In Advance of Need: Yes  Hourly Visual Checks: In chair, Awake  Fall Visual Posted: Fall sign posted, Socks, Armband  Room Door Open: Yes  Alarm On: Bed, Chair  Patient Moved Closer to Nursing Station: No    Active Consults:   IP CONSULT TO GI    Length of Stay:  Expected LOS: 7  Actual LOS:

## 2025-04-13 NOTE — CONSULTS
Pulmonary Consult Note    Requesting Provider: Kris Nino MD     Reason for Consult: Acute hypoxic respiratory failure    Assessment   Acute hypoxic respiratory failure  Pulmonary edema and possible CAP.  Bilateral pleural effusions  Sleep apnea  CHF      Plan  I independently reviewed CT chest, shows diffuse infiltrates and bilateral pleural effusions and suggests pulmonary edema. He is requiring 6L oxygen with saturations in the upper 80's. Needs aggressive diuresis with at least 1 lit neg fluid balance in next 24hrs.  Needs TTE, Already ordered.  Patient is on zosyn and azithromycin - continue for now.  Uses CPAP at home  BNP of 13,918, ECHO pending, receiving IV diuretics      L4    HPI:   Jonatan Rausch Jr. Is a 80 y.o. male with past medical history of coronary artery disease s/p CABG, Parkinson's disease that came to the hospital for SOB and contines to have hypoxemic. Pulmonary consult is requested for evaluation and co management. He is receiving zosyn and azithromycin and is being diuresed.  Significant labs are WBC of 14.4 and however, Procalcitonin was only 0.05. Pro BNP was 13,918. Patient continues to require 6L oxygen.       Review of Systems: All other systems have been reviewed and are negative except per HPI    Past Medical History:  Past Medical History:   Diagnosis Date    CAD (coronary artery disease)     Hypertension     Sleep apnea        Social History:  The patient states that he quit smoking 25 years ago.    Medications:  Current Facility-Administered Medications   Medication Dose Route Frequency Provider Last Rate Last Admin    ipratropium 0.5 mg-albuterol 2.5 mg (DUONEB) nebulizer solution 1 Dose  1 Dose Inhalation Q6H RT Kris Nino MD   1 Dose at 04/13/25 1433    potassium chloride (KLOR-CON M) extended release tablet 40 mEq  40 mEq Oral BID Kirs Nino MD   40 mEq at 04/13/25 1159    azithromycin (ZITHROMAX) tablet 500 mg  500 mg Oral Daily Kris Nino MD   500 mg at

## 2025-04-14 ENCOUNTER — APPOINTMENT (OUTPATIENT)
Facility: HOSPITAL | Age: 80
DRG: 871 | End: 2025-04-14
Payer: MEDICARE

## 2025-04-14 ENCOUNTER — APPOINTMENT (OUTPATIENT)
Facility: HOSPITAL | Age: 80
DRG: 871 | End: 2025-04-14
Attending: INTERNAL MEDICINE
Payer: MEDICARE

## 2025-04-14 LAB
ANION GAP SERPL CALC-SCNC: 8 MMOL/L (ref 2–12)
B PERT DNA SPEC QL NAA+PROBE: NOT DETECTED
BASOPHILS # BLD: 0.02 K/UL (ref 0–0.1)
BASOPHILS NFR BLD: 0.1 % (ref 0–1)
BORDETELLA PARAPERTUSSIS BY PCR: NOT DETECTED
BUN SERPL-MCNC: 30 MG/DL (ref 6–20)
BUN/CREAT SERPL: 21 (ref 12–20)
C PNEUM DNA SPEC QL NAA+PROBE: NOT DETECTED
CALCIUM SERPL-MCNC: 9.1 MG/DL (ref 8.5–10.1)
CHLORIDE SERPL-SCNC: 97 MMOL/L (ref 97–108)
CO2 SERPL-SCNC: 31 MMOL/L (ref 21–32)
CREAT SERPL-MCNC: 1.42 MG/DL (ref 0.7–1.3)
DIFFERENTIAL METHOD BLD: ABNORMAL
EOSINOPHIL # BLD: 0.03 K/UL (ref 0–0.4)
EOSINOPHIL NFR BLD: 0.2 % (ref 0–7)
ERYTHROCYTE [DISTWIDTH] IN BLOOD BY AUTOMATED COUNT: 16.3 % (ref 11.5–14.5)
FLUAV SUBTYP SPEC NAA+PROBE: NOT DETECTED
FLUBV RNA SPEC QL NAA+PROBE: NOT DETECTED
GLUCOSE SERPL-MCNC: 123 MG/DL (ref 65–100)
HADV DNA SPEC QL NAA+PROBE: NOT DETECTED
HCOV 229E RNA SPEC QL NAA+PROBE: NOT DETECTED
HCOV HKU1 RNA SPEC QL NAA+PROBE: NOT DETECTED
HCOV NL63 RNA SPEC QL NAA+PROBE: NOT DETECTED
HCOV OC43 RNA SPEC QL NAA+PROBE: NOT DETECTED
HCT VFR BLD AUTO: 34.6 % (ref 36.6–50.3)
HGB BLD-MCNC: 10.9 G/DL (ref 12.1–17)
HMPV RNA SPEC QL NAA+PROBE: NOT DETECTED
HPIV1 RNA SPEC QL NAA+PROBE: NOT DETECTED
HPIV2 RNA SPEC QL NAA+PROBE: NOT DETECTED
HPIV3 RNA SPEC QL NAA+PROBE: NOT DETECTED
HPIV4 RNA SPEC QL NAA+PROBE: NOT DETECTED
IMM GRANULOCYTES # BLD AUTO: 0.08 K/UL (ref 0–0.04)
IMM GRANULOCYTES NFR BLD AUTO: 0.6 % (ref 0–0.5)
LYMPHOCYTES # BLD: 0.63 K/UL (ref 0.8–3.5)
LYMPHOCYTES NFR BLD: 4.4 % (ref 12–49)
M PNEUMO DNA SPEC QL NAA+PROBE: NOT DETECTED
MCH RBC QN AUTO: 26.4 PG (ref 26–34)
MCHC RBC AUTO-ENTMCNC: 31.5 G/DL (ref 30–36.5)
MCV RBC AUTO: 83.8 FL (ref 80–99)
MONOCYTES # BLD: 1.31 K/UL (ref 0–1)
MONOCYTES NFR BLD: 9.1 % (ref 5–13)
NEUTS SEG # BLD: 12.3 K/UL (ref 1.8–8)
NEUTS SEG NFR BLD: 85.6 % (ref 32–75)
NRBC # BLD: 0 K/UL (ref 0–0.01)
NRBC BLD-RTO: 0 PER 100 WBC
PLATELET # BLD AUTO: 314 K/UL (ref 150–400)
PMV BLD AUTO: 11.1 FL (ref 8.9–12.9)
POTASSIUM SERPL-SCNC: 3.2 MMOL/L (ref 3.5–5.1)
RBC # BLD AUTO: 4.13 M/UL (ref 4.1–5.7)
RSV RNA SPEC QL NAA+PROBE: NOT DETECTED
RV+EV RNA SPEC QL NAA+PROBE: NOT DETECTED
SARS-COV-2 RNA RESP QL NAA+PROBE: NOT DETECTED
SODIUM SERPL-SCNC: 136 MMOL/L (ref 136–145)
WBC # BLD AUTO: 14.4 K/UL (ref 4.1–11.1)

## 2025-04-14 PROCEDURE — 94640 AIRWAY INHALATION TREATMENT: CPT

## 2025-04-14 PROCEDURE — 94761 N-INVAS EAR/PLS OXIMETRY MLT: CPT

## 2025-04-14 PROCEDURE — 6370000000 HC RX 637 (ALT 250 FOR IP): Performed by: INTERNAL MEDICINE

## 2025-04-14 PROCEDURE — 85025 COMPLETE CBC W/AUTO DIFF WBC: CPT

## 2025-04-14 PROCEDURE — 6360000002 HC RX W HCPCS: Performed by: INTERNAL MEDICINE

## 2025-04-14 PROCEDURE — 71045 X-RAY EXAM CHEST 1 VIEW: CPT

## 2025-04-14 PROCEDURE — 80048 BASIC METABOLIC PNL TOTAL CA: CPT

## 2025-04-14 PROCEDURE — 2580000003 HC RX 258: Performed by: INTERNAL MEDICINE

## 2025-04-14 PROCEDURE — C8929 TTE W OR WO FOL WCON,DOPPLER: HCPCS

## 2025-04-14 PROCEDURE — 97116 GAIT TRAINING THERAPY: CPT

## 2025-04-14 PROCEDURE — 1100000003 HC PRIVATE W/ TELEMETRY

## 2025-04-14 PROCEDURE — 36415 COLL VENOUS BLD VENIPUNCTURE: CPT

## 2025-04-14 PROCEDURE — 0202U NFCT DS 22 TRGT SARS-COV-2: CPT

## 2025-04-14 PROCEDURE — 6360000002 HC RX W HCPCS: Performed by: HOSPITALIST

## 2025-04-14 PROCEDURE — 6360000004 HC RX CONTRAST MEDICATION: Performed by: INTERNAL MEDICINE

## 2025-04-14 PROCEDURE — 2500000003 HC RX 250 WO HCPCS: Performed by: INTERNAL MEDICINE

## 2025-04-14 PROCEDURE — 2700000000 HC OXYGEN THERAPY PER DAY

## 2025-04-14 RX ORDER — FUROSEMIDE 10 MG/ML
40 INJECTION INTRAMUSCULAR; INTRAVENOUS 2 TIMES DAILY
Status: DISCONTINUED | OUTPATIENT
Start: 2025-04-14 | End: 2025-04-19

## 2025-04-14 RX ORDER — METOLAZONE 5 MG/1
5 TABLET ORAL DAILY
Status: DISCONTINUED | OUTPATIENT
Start: 2025-04-14 | End: 2025-04-15

## 2025-04-14 RX ADMIN — PIPERACILLIN AND TAZOBACTAM 3375 MG: 3; .375 INJECTION, POWDER, LYOPHILIZED, FOR SOLUTION INTRAVENOUS at 22:58

## 2025-04-14 RX ADMIN — SODIUM CHLORIDE, PRESERVATIVE FREE 10 ML: 5 INJECTION INTRAVENOUS at 22:58

## 2025-04-14 RX ADMIN — PIPERACILLIN AND TAZOBACTAM 3375 MG: 3; .375 INJECTION, POWDER, LYOPHILIZED, FOR SOLUTION INTRAVENOUS at 13:19

## 2025-04-14 RX ADMIN — FUROSEMIDE 60 MG: 10 INJECTION, SOLUTION INTRAMUSCULAR; INTRAVENOUS at 13:14

## 2025-04-14 RX ADMIN — POTASSIUM CHLORIDE 40 MEQ: 1500 TABLET, EXTENDED RELEASE ORAL at 22:34

## 2025-04-14 RX ADMIN — FUROSEMIDE 60 MG: 10 INJECTION, SOLUTION INTRAMUSCULAR; INTRAVENOUS at 00:02

## 2025-04-14 RX ADMIN — CARBIDOPA AND LEVODOPA 1 TABLET: 25; 100 TABLET, EXTENDED RELEASE ORAL at 09:46

## 2025-04-14 RX ADMIN — AZITHROMYCIN 500 MG: 250 TABLET, FILM COATED ORAL at 09:46

## 2025-04-14 RX ADMIN — SODIUM CHLORIDE: 0.9 INJECTION, SOLUTION INTRAVENOUS at 13:18

## 2025-04-14 RX ADMIN — PIPERACILLIN AND TAZOBACTAM 3375 MG: 3; .375 INJECTION, POWDER, LYOPHILIZED, FOR SOLUTION INTRAVENOUS at 03:46

## 2025-04-14 RX ADMIN — MEMANTINE 5 MG: 10 TABLET ORAL at 09:46

## 2025-04-14 RX ADMIN — POTASSIUM CHLORIDE 40 MEQ: 1500 TABLET, EXTENDED RELEASE ORAL at 09:46

## 2025-04-14 RX ADMIN — FINASTERIDE 5 MG: 5 TABLET, FILM COATED ORAL at 09:46

## 2025-04-14 RX ADMIN — SERTRALINE 50 MG: 50 TABLET, FILM COATED ORAL at 09:46

## 2025-04-14 RX ADMIN — AMLODIPINE BESYLATE 5 MG: 5 TABLET ORAL at 22:33

## 2025-04-14 RX ADMIN — ATORVASTATIN CALCIUM 20 MG: 20 TABLET, FILM COATED ORAL at 22:34

## 2025-04-14 RX ADMIN — IPRATROPIUM BROMIDE AND ALBUTEROL SULFATE 1 DOSE: 2.5; .5 SOLUTION RESPIRATORY (INHALATION) at 21:46

## 2025-04-14 RX ADMIN — DONEPEZIL HYDROCHLORIDE 10 MG: 5 TABLET, FILM COATED ORAL at 22:33

## 2025-04-14 RX ADMIN — METOLAZONE 5 MG: 5 TABLET ORAL at 17:49

## 2025-04-14 RX ADMIN — MELATONIN 6 MG: at 22:33

## 2025-04-14 RX ADMIN — FUROSEMIDE 40 MG: 10 INJECTION, SOLUTION INTRAMUSCULAR; INTRAVENOUS at 22:33

## 2025-04-14 RX ADMIN — BUDESONIDE 500 MCG: 0.5 INHALANT RESPIRATORY (INHALATION) at 08:30

## 2025-04-14 RX ADMIN — ASPIRIN 81 MG: 81 TABLET, COATED ORAL at 22:33

## 2025-04-14 RX ADMIN — TROSPIUM CHLORIDE 20 MG: 20 TABLET, FILM COATED ORAL at 22:34

## 2025-04-14 RX ADMIN — IPRATROPIUM BROMIDE AND ALBUTEROL SULFATE 1 DOSE: 2.5; .5 SOLUTION RESPIRATORY (INHALATION) at 08:30

## 2025-04-14 RX ADMIN — IPRATROPIUM BROMIDE AND ALBUTEROL SULFATE 1 DOSE: 2.5; .5 SOLUTION RESPIRATORY (INHALATION) at 02:58

## 2025-04-14 RX ADMIN — ENOXAPARIN SODIUM 40 MG: 100 INJECTION SUBCUTANEOUS at 09:46

## 2025-04-14 RX ADMIN — SODIUM CHLORIDE, PRESERVATIVE FREE 10 ML: 5 INJECTION INTRAVENOUS at 09:46

## 2025-04-14 RX ADMIN — QUETIAPINE FUMARATE 25 MG: 25 TABLET ORAL at 17:49

## 2025-04-14 RX ADMIN — MEMANTINE 5 MG: 10 TABLET ORAL at 22:33

## 2025-04-14 RX ADMIN — BUDESONIDE 500 MCG: 0.5 INHALANT RESPIRATORY (INHALATION) at 21:51

## 2025-04-14 RX ADMIN — IPRATROPIUM BROMIDE AND ALBUTEROL SULFATE 1 DOSE: 2.5; .5 SOLUTION RESPIRATORY (INHALATION) at 15:02

## 2025-04-14 RX ADMIN — FUROSEMIDE 60 MG: 10 INJECTION, SOLUTION INTRAMUSCULAR; INTRAVENOUS at 05:49

## 2025-04-14 RX ADMIN — CARBIDOPA AND LEVODOPA 1 TABLET: 25; 100 TABLET, EXTENDED RELEASE ORAL at 22:33

## 2025-04-14 RX ADMIN — PANTOPRAZOLE SODIUM 40 MG: 40 TABLET, DELAYED RELEASE ORAL at 05:49

## 2025-04-14 RX ADMIN — TAMSULOSIN HYDROCHLORIDE 0.4 MG: 0.4 CAPSULE ORAL at 09:46

## 2025-04-14 RX ADMIN — SULFUR HEXAFLUORIDE 2 ML: KIT at 10:57

## 2025-04-14 NOTE — PROGRESS NOTES
End of Shift Note    Bedside shift change report given to BAR Escamilal (oncoming nurse) by Kamini Acevedo RN (offgoing nurse).  Report included the following information SBAR, Kardex, ED Summary, Procedure Summary, Intake/Output, MAR, Accordion, Recent Results, Med Rec Status, and Cardiac Rhythm Sinus Tachy    Shift worked:  7a-7p     Shift summary and any significant changes:     Patient continues on 4L 02. Hourly rounding completed. CHG bath given. Patient voiced no complaints of pain throughout shift.     Concerns for physician to address:  None      Zone phone for oncoming shift:   [phone]       Activity:  Level of Assistance: Moderate assist, patient does 50-74%  Number times ambulated in hallways past shift: 0  Number of times OOB to chair past shift: 1    Cardiac:   Cardiac Monitoring: Yes      Cardiac Rhythm: Sinus tachy    Access:  Current line(s): PIV     Genitourinary:   Urinary Status: Voiding, External catheter    Respiratory:   O2 Device: Nasal cannula  Chronic home O2 use?: YES  Incentive spirometer at bedside: YES    GI:  Last BM (including prior to admit): 04/09/25  Current diet:  ADULT DIET; Regular; GI [place] (GERD/Peptic Ulcer)  Passing flatus: YES    Pain Management:   Patient states pain is manageable on current regimen: YES    Skin:  Krishna Scale Score: 17  Interventions: Wound Offloading (Prevention Methods): Repositioning, Turning, Pillows    Patient Safety:  Fall Risk: Nursing Judgement-Fall Risk High(Add Comments): Yes  Fall Risk Interventions  Nursing Judgement-Fall Risk High(Add Comments): Yes  Toilet Every 2 Hours-In Advance of Need: Yes  Hourly Visual Checks: In chair, Awake  Fall Visual Posted: Fall sign posted, Socks, Armband  Room Door Open: Yes  Alarm On: Bed, Chair  Patient Moved Closer to Nursing Station: No    Active Consults:   IP CONSULT TO GI  IP CONSULT TO PULMONOLOGY    Length of Stay:  Expected LOS: 7  Actual LOS: 6    Kamini Acevedo

## 2025-04-14 NOTE — PROGRESS NOTES
Pulmonary Progress Note    Patient: Jonatan Rausch Jr.                     YOB: 1945        Date- 4/14/2025                           Admit Date: 4/7/2025       CC: Follow up for hypoxia, pulmonary edema and pleural effusions.        IMPRESSION & PLAN:     Acute hypoxic respiratory failure  Pulmonary edema and possible CAP  Bilateral pleural effusions  Sleep apnea, on CPAP  CHF    Plan    Acute hypoxia, currently requiring O2 6 LPM.  Wean O2 gradually as tolerated.  Worsening groundglass opacities with improved pleural effusions on chest CT 4/13.  Suggestive of cardiogenic edema   Mild PABLO, decrease Lasix dose.  Cardiology evaluation and echo results pending.  Echo results pending.  Negative respiratory viral panel, blood cultures and procalcitonin level.  On Zosyn and azithromycin.  Obtain sputum cultures, CRP, sed rate and pneumonia antigens.  CPAP therapy at night.    Total time on encounter > 50 min        Subjective:    Interval History:    Chart reviewed, admission with hypoxia, pulmonary infiltrates and pleural effusions noted.     Shortness of breath unchanged.  Reports cough with thick white sputum.    Remains on O2 6 LPM.    On diuretics, patient has put out about 2.3 L urine and has not net negative for about 1800 cc    Patient is also on azithromycin and Zosyn.  Patient is afebrile.  Mild tachycardia last night.    Serum creatinine 1.4, trending up    White count 14K, not significantly changed in this admission.  Blood cultures x 2 no growth.  Procalcitonin normal.  Respiratory viral panel, mycoplasma Chlamydophila and Bordetella PCRs negative.  Admission BNP close to 14K    Echo results pending.    Medications:  Current Facility-Administered Medications   Medication Dose Route Frequency Provider Last Rate Last Admin    ipratropium 0.5 mg-albuterol 2.5 mg (DUONEB) nebulizer solution 1 Dose  1 Dose Inhalation Q6H RT Kris Nino MD   1 Dose at 04/14/25 1502    pantoprazole  (NORVASC) tablet 5 mg  5 mg Oral QHS Kg Domínguez Jr., MD   5 mg at 04/13/25 2055    aspirin EC tablet 81 mg  81 mg Oral QHS Kg Domínguez Jr., MD   81 mg at 04/13/25 2054    carbidopa-levodopa (SINEMET CR)  MG per extended release tablet 1 tablet  1 tablet Oral BID Kg Domínguez Jr., MD   1 tablet at 04/14/25 0946    donepezil (ARICEPT) tablet 10 mg  10 mg Oral Nightly Kg Domínguez Jr., MD   10 mg at 04/13/25 2055    finasteride (PROSCAR) tablet 5 mg  5 mg Oral Daily Kg Domínguez Jr., MD   5 mg at 04/14/25 0946    memantine (NAMENDA) tablet 5 mg  5 mg Oral BID Kg Domínguez Jr., MD   5 mg at 04/14/25 0946    sertraline (ZOLOFT) tablet 50 mg  50 mg Oral Daily Kg Domínguez Jr., MD   50 mg at 04/14/25 0946    atorvastatin (LIPITOR) tablet 20 mg  20 mg Oral Nightly Kg Domínguez Jr., MD   20 mg at 04/13/25 2054    tamsulosin (FLOMAX) capsule 0.4 mg  0.4 mg Oral Daily Kg Domínguez Jr., MD   0.4 mg at 04/14/25 0946    trospium (SANCTURA) tablet 20 mg  20 mg Oral Nightly Kg Domínguez Jr., MD   20 mg at 04/13/25 2054    haloperidol lactate (HALDOL) injection 2 mg  2 mg IntraMUSCular Q6H PRN Kg Domínguez Jr., MD   2 mg at 04/09/25 2108    melatonin tablet 6 mg  6 mg Oral Nightly Kg Domínguez Jr., MD   6 mg at 04/13/25 2054       I/O's:    Intake/Output Summary (Last 24 hours) at 4/14/2025 1518  Last data filed at 4/14/2025 0835  Gross per 24 hour   Intake 450 ml   Output 1400 ml   Net -950 ml            Vital Signs:  /81   Pulse 94   Temp 97.5 °F (36.4 °C) (Oral)   Resp 24   Ht 1.676 m (5' 6\")   Wt 82.6 kg (182 lb)   SpO2 (!) 86% Comment: alerted RN  BMI 29.38 kg/m²  O2 Device: Nasal cannula      Physical exam:     GEN: Patient lying comfortably in bed in no apparent discomfort.  HEENT: No conjunctival pallor, oral mucosa is moist.  NECK: No JVD   LUNGS:   HEART: Regular with normal S1 and S2   EXTREMITIES: Warm and well-perfused.  No edema

## 2025-04-14 NOTE — PROGRESS NOTES
Occupational Therapy     Chart reviewed pt in prep for OT treatment session. Per IDR, pt has been requiring increased O2 demands as length progresses in acute care. Will defer and continue to follow pt.     Ladarius Hollingsworth, CARLOS, OTR/L

## 2025-04-14 NOTE — PLAN OF CARE
Problem: Discharge Planning  Goal: Discharge to home or other facility with appropriate resources  4/14/2025 0951 by Vianney Biswas RN  Outcome: Progressing  4/14/2025 0015 by Nicole Gudino RN  Outcome: Progressing     Problem: Skin/Tissue Integrity  Goal: Skin integrity remains intact  Description: 1.  Monitor for areas of redness and/or skin breakdown  2.  Assess vascular access sites hourly  3.  Every 4-6 hours minimum:  Change oxygen saturation probe site  4.  Every 4-6 hours:  If on nasal continuous positive airway pressure, respiratory therapy assess nares and determine need for appliance change or resting period  4/14/2025 0951 by Vianney Biswas RN  Outcome: Progressing  4/14/2025 0015 by Nicole Gudino RN  Outcome: Progressing     Problem: Safety - Adult  Goal: Free from fall injury  4/14/2025 0951 by Vianney Biswas RN  Outcome: Progressing  4/14/2025 0015 by Nicole Gudino RN  Outcome: Progressing     Problem: Neurosensory - Adult  Goal: Achieves stable or improved neurological status  4/14/2025 0951 by Vianney Biswas RN  Outcome: Progressing  4/14/2025 0015 by Nicole Gudino RN  Outcome: Progressing  Goal: Achieves maximal functionality and self care  4/14/2025 0951 by Vianney Biswas RN  Outcome: Progressing  4/14/2025 0015 by Nicole Gudino RN  Outcome: Progressing     Problem: Respiratory - Adult  Goal: Achieves optimal ventilation and oxygenation  4/14/2025 0951 by Vianney Biswas RN  Outcome: Progressing  4/14/2025 0015 by Nicole Gudino RN  Outcome: Progressing  4/13/2025 2107 by Belinda Mccarthy, RT  Outcome: Progressing     Problem: Cardiovascular - Adult  Goal: Maintains optimal cardiac output and hemodynamic stability  4/14/2025 0951 by Vianney Biswas RN  Outcome: Progressing  4/14/2025 0015 by Nicole Gudino RN  Outcome: Progressing  Goal: Absence of cardiac dysrhythmias or at baseline  4/14/2025 0951 by Vianney Biswas RN  Outcome: Progressing  4/14/2025 0015 by Nicole Gudino

## 2025-04-14 NOTE — PLAN OF CARE
Problem: Physical Therapy - Adult  Goal: By Discharge: Performs mobility at highest level of function for planned discharge setting.  See evaluation for individualized goals.  Description: FUNCTIONAL STATUS PRIOR TO ADMISSION: Pt ambulates independently with use of SBQC. Endorses h/o falls. Stopped driving Nov 2024. Pt lives with wife who provides support as needed. PMH includes dementia and Parkinson's disease.     Physical Therapy Goals  Initiated 4/8/2025  1.  Patient will move from supine to sit and sit to supine in bed with modified independence within 7 day(s).    2.  Patient will perform sit to stand with modified independence within 7 day(s).  3.  Patient will transfer from bed to chair and chair to bed with supervision/set-up using the least restrictive device within 7 day(s).  4.  Patient will ambulate with supervision/set-up for 100 feet with the least restrictive device within 7 day(s).   5.  Patient will ascend/descend 3 stairs with QC and minimal assistance within 7 day(s).   Outcome: Progressing       PHYSICAL THERAPY TREATMENT    Patient: Jonatan Rausch JrPolly (80 y.o. male)  Date: 4/14/2025  Diagnosis: Sepsis (HCC) [A41.9]  Community acquired pneumonia, unspecified laterality [J18.9] Sepsis (HCC)      Precautions: Restrictions/Precautions  Restrictions/Precautions: Fall Risk            ASSESSMENT:  Patient continues to benefit from skilled PT services and is progressing towards goals. Pt generally mobilized at a CGA to Min A level during today's session. Pt received reclined in chair, remote tele, 6LNC, and agreeable to work with therapy. Pt performed bed mobility, transfers, gait training, balance assessment this session.   Pt responded to room as imaging tech was present needing pt transferred chair>bed for imaging.  SpO2 found 86% maxed on low flow NC at 6L. PLB did not readily improved sats.  Pt transferred chair>bed with quad cane which he only used intermittently and tended to carry during

## 2025-04-14 NOTE — PLAN OF CARE
Problem: Discharge Planning  Goal: Discharge to home or other facility with appropriate resources  Outcome: Progressing     Problem: Skin/Tissue Integrity  Goal: Skin integrity remains intact  Description: 1.  Monitor for areas of redness and/or skin breakdown  2.  Assess vascular access sites hourly  3.  Every 4-6 hours minimum:  Change oxygen saturation probe site  4.  Every 4-6 hours:  If on nasal continuous positive airway pressure, respiratory therapy assess nares and determine need for appliance change or resting period  Outcome: Progressing     Problem: Safety - Adult  Goal: Free from fall injury  Outcome: Progressing     Problem: Neurosensory - Adult  Goal: Achieves stable or improved neurological status  Outcome: Progressing  Goal: Achieves maximal functionality and self care  Outcome: Progressing     Problem: Respiratory - Adult  Goal: Achieves optimal ventilation and oxygenation  4/14/2025 0015 by Nicole Gudino RN  Outcome: Progressing  4/13/2025 2107 by Belinda Mccarthy, RT  Outcome: Progressing     Problem: Cardiovascular - Adult  Goal: Maintains optimal cardiac output and hemodynamic stability  Outcome: Progressing  Goal: Absence of cardiac dysrhythmias or at baseline  Outcome: Progressing     Problem: Skin/Tissue Integrity - Adult  Goal: Skin integrity remains intact  Description: 1.  Monitor for areas of redness and/or skin breakdown  2.  Assess vascular access sites hourly  3.  Every 4-6 hours minimum:  Change oxygen saturation probe site  4.  Every 4-6 hours:  If on nasal continuous positive airway pressure, respiratory therapy assess nares and determine need for appliance change or resting period  Outcome: Progressing  Goal: Incisions, wounds, or drain sites healing without S/S of infection  Outcome: Progressing  Goal: Oral mucous membranes remain intact  Outcome: Progressing     Problem: Musculoskeletal - Adult  Goal: Return mobility to safest level of function  Outcome: Progressing  Goal:

## 2025-04-14 NOTE — PROGRESS NOTES
End of Shift Note    Bedside shift change report given to BAR [place]ar[place] (oncoming nurse) by Kamini Acevedo RN (offgoing nurse).  Report included the following information SBAR, Kardex, ED Summary, Procedure Summary, Intake/Output, MAR, Accordion, Recent Results, Med Rec Status, Cardiac Rhythm Sinus Tachy, Alarm Parameters , and Quality Measures    Shift worked:  7a-7p     Shift summary and any significant changes:     Patient continues with 6L 02. Tolerated procedure. Hourly visual checks completed. No complaints of pain voiced throughout shift. Encouraged to increase oral intake.      Concerns for physician to address:  None      Zone phone for oncoming shift:   [phone]       Activity:  Level of Assistance: Moderate assist, patient does 50-74%  Number times ambulated in hallways past shift: 0  Number of times OOB to chair past shift: 0    Cardiac:   Cardiac Monitoring: Yes      Cardiac Rhythm: Sinus tachy    Access:  Current line(s): PIV     Genitourinary:   Urinary Status: Voiding, External catheter    Respiratory:   O2 Device: Nasal cannula  Chronic home O2 use?: YES  Incentive spirometer at bedside: YES    GI:  Last BM (including prior to admit): 04/09/25  Current diet:  ADULT DIET; Regular; GI [place] (GERD/Peptic Ulcer)  Passing flatus: YES    Pain Management:   Patient states pain is manageable on current regimen: YES    Skin:  Krishna Scale Score: 17  Interventions: Wound Offloading (Prevention Methods): Repositioning, Turning, Pillows    Patient Safety:  Fall Risk: Nursing Judgement-Fall Risk High(Add Comments): Yes  Fall Risk Interventions  Nursing Judgement-Fall Risk High(Add Comments): Yes  Toilet Every 2 Hours-In Advance of Need: Yes  Hourly Visual Checks: In chair, Awake  Fall Visual Posted: Fall sign posted, Socks, Armband  Room Door Open: Yes  Alarm On: Bed, Chair  Patient Moved Closer to Nursing Station: No    Active Consults:   IP CONSULT TO GI  IP CONSULT TO PULMONOLOGY    Length of

## 2025-04-14 NOTE — PROGRESS NOTES
End of Shift Note    Bedside shift change report given to Neris DINERO (oncoming nurse) by Vianney Biswas RN (offgoing nurse).  Report included the following information SBAR, Kardex, ED Summary, Procedure Summary, Intake/Output, MAR, Accordion, Recent Results, Med Rec Status, Cardiac Rhythm Sinus Tachy, Alarm Parameters , and Quality Measures    Shift worked:  7a-7p     Shift summary and any significant changes:     Patient is alert and ox 3 , Patient continues with 6L o2.  BMX1  Covid 19 lab work sent to lab   Urine and respiratory culture is pending   metOLazone (ZAROXOLYN) tablet 5 mg given , verified with pharmacist .   Hourly visual checks completed.   No complaints of pain voiced throughout shift.   Encouraged to increase oral intake.      Concerns for physician to address:  -   Zone phone for oncoming shift:   [phone]       Activity:  Level of Assistance: Moderate assist, patient does 50-74%  Number times ambulated in hallways past shift: 0  Number of times OOB to chair past shift: 0    Cardiac:   Cardiac Monitoring: Yes      Cardiac Rhythm: Sinus tachy    Access:  Current line(s): PIV     Genitourinary:   Urinary Status: Voiding, External catheter    Respiratory:   O2 Device: Nasal cannula  Chronic home O2 use?: YES  Incentive spirometer at bedside: YES    GI:  Last BM (including prior to admit): 04/14/25  Current diet:  ADULT DIET; Regular; GI [place] (GERD/Peptic Ulcer)  Passing flatus: YES    Pain Management:   Patient states pain is manageable on current regimen: YES    Skin:  Krishna Scale Score: 17  Interventions: Wound Offloading (Prevention Methods): Elevate heels, Pillows, Repositioning    Patient Safety:  Fall Risk: Nursing Judgement-Fall Risk High(Add Comments): Yes  Fall Risk Interventions  Nursing Judgement-Fall Risk High(Add Comments): Yes  Toilet Every 2 Hours-In Advance of Need: Yes  Hourly Visual Checks: In chair, Awake  Fall Visual Posted: Fall sign posted, Socks, Armband  Room Door Open: Yes  Alarm

## 2025-04-14 NOTE — PROGRESS NOTES
Patient asleep mouth breathing and breathing fast.  SPO2 92-94% on 6LPM nasal cannula.  Nurse had called Rapid Response nurse to check on patient but not Respiratory. Patient in no distress. Rapid Response nurse agree with no further intervention needed at this time.  Previous nurse gave Diuretic to patient earlier. At 2108 patient's SPO2 was 91% on 6LPM nasal cannula then the nurse gave Diuretic. At 2124 SPO2 was 93% on 6LPM nasal cannula.

## 2025-04-15 PROBLEM — E44.0 MODERATE PROTEIN-CALORIE MALNUTRITION: Status: ACTIVE | Noted: 2025-04-15

## 2025-04-15 LAB
ANION GAP SERPL CALC-SCNC: 9 MMOL/L (ref 2–12)
ARTERIAL PATENCY WRIST A: YES
BASE EXCESS BLDA CALC-SCNC: 6 MMOL/L
BDY SITE: ABNORMAL
BUN SERPL-MCNC: 47 MG/DL (ref 6–20)
BUN/CREAT SERPL: 19 (ref 12–20)
CALCIUM SERPL-MCNC: 8.9 MG/DL (ref 8.5–10.1)
CHLORIDE SERPL-SCNC: 99 MMOL/L (ref 97–108)
CO2 SERPL-SCNC: 29 MMOL/L (ref 21–32)
CREAT SERPL-MCNC: 2.48 MG/DL (ref 0.7–1.3)
CRP SERPL-MCNC: 9.43 MG/DL (ref 0–0.3)
ECHO AR MAX VEL PISA: 4.3 M/S
ECHO AV AREA PEAK VELOCITY: 1.5 CM2
ECHO AV AREA/BSA PEAK VELOCITY: 0.8 CM2/M2
ECHO AV MEAN GRADIENT: 7 MMHG
ECHO AV MEAN VELOCITY: 1.3 M/S
ECHO AV PEAK GRADIENT: 14 MMHG
ECHO AV PEAK VELOCITY: 1.9 M/S
ECHO AV REGURGITANT PHT: 292.6 MS
ECHO AV VELOCITY RATIO: 0.47
ECHO AV VTI: 29.9 CM
ECHO BSA: 1.96 M2
ECHO EST RA PRESSURE: 8 MMHG
ECHO LV EDV A2C: 153 ML
ECHO LV EDV A4C: 190 ML
ECHO LV EDV BP: 171 ML (ref 67–155)
ECHO LV EDV INDEX A4C: 99 ML/M2
ECHO LV EDV INDEX BP: 89 ML/M2
ECHO LV EDV NDEX A2C: 80 ML/M2
ECHO LV EF PHYSICIAN: 15 %
ECHO LV EJECTION FRACTION A2C: 14 %
ECHO LV EJECTION FRACTION A4C: 30 %
ECHO LV EJECTION FRACTION BIPLANE: 23 % (ref 55–100)
ECHO LV ESV A2C: 132 ML
ECHO LV ESV A4C: 133 ML
ECHO LV ESV BP: 132 ML (ref 22–58)
ECHO LV ESV INDEX A2C: 69 ML/M2
ECHO LV ESV INDEX A4C: 69 ML/M2
ECHO LV ESV INDEX BP: 69 ML/M2
ECHO LV INTERNAL DIMENSION DIASTOLE INDEX: 3.02 CM/M2
ECHO LV INTERNAL DIMENSION DIASTOLIC: 5.8 CM (ref 4.2–5.9)
ECHO LV IVSD: 1 CM (ref 0.6–1)
ECHO LV MASS 2D: 218.1 G (ref 88–224)
ECHO LV MASS INDEX 2D: 113.6 G/M2 (ref 49–115)
ECHO LV POSTERIOR WALL DIASTOLIC: 0.9 CM (ref 0.6–1)
ECHO LV RELATIVE WALL THICKNESS RATIO: 0.31
ECHO LVOT AREA: 3.1 CM2
ECHO LVOT DIAM: 2 CM
ECHO LVOT MEAN GRADIENT: 2 MMHG
ECHO LVOT PEAK GRADIENT: 3 MMHG
ECHO LVOT PEAK VELOCITY: 0.9 M/S
ECHO MV EROA PISA: 0.1 CM2
ECHO MV MAX VELOCITY: 0.8 M/S
ECHO MV MEAN GRADIENT: 1 MMHG
ECHO MV MEAN VELOCITY: 0.5 M/S
ECHO MV PEAK GRADIENT: 2 MMHG
ECHO MV REGURGITANT ALIASING (NYQUIST) VELOCITY: 34 CM/S
ECHO MV REGURGITANT RADIUS PISA: 0.33 CM
ECHO MV REGURGITANT VELOCITY PISA: 3.8 M/S
ECHO MV REGURGITANT VOLUME PISA: 7.71 ML
ECHO MV REGURGITANT VTIA: 126 CM
ECHO MV VTI: 18.6 CM
ECHO RIGHT VENTRICULAR SYSTOLIC PRESSURE (RVSP): 31 MMHG
ECHO RV INTERNAL DIMENSION: 4 CM
ECHO RV TAPSE: 1.4 CM (ref 1.7–?)
ECHO TV REGURGITANT MAX VELOCITY: 2.38 M/S
ECHO TV REGURGITANT PEAK GRADIENT: 23 MMHG
ERYTHROCYTE [SEDIMENTATION RATE] IN BLOOD: 32 MM/HR (ref 0–20)
GLUCOSE SERPL-MCNC: 117 MG/DL (ref 65–100)
HCO3 BLDA-SCNC: 30 MMOL/L (ref 22–26)
IRON SATN MFR SERPL: 4 % (ref 20–50)
IRON SERPL-MCNC: 12 UG/DL (ref 35–150)
PCO2 BLDA: 39 MMHG (ref 35–45)
PH BLDA: 7.5 (ref 7.35–7.45)
PO2 BLDA: 70 MMHG (ref 80–100)
POTASSIUM SERPL-SCNC: 3.8 MMOL/L (ref 3.5–5.1)
SAO2 % BLD: 95 % (ref 92–97)
SAO2% DEVICE SAO2% SENSOR NAME: ABNORMAL
SODIUM SERPL-SCNC: 137 MMOL/L (ref 136–145)
SPECIMEN SITE: ABNORMAL
TIBC SERPL-MCNC: 294 UG/DL (ref 250–450)

## 2025-04-15 PROCEDURE — 6370000000 HC RX 637 (ALT 250 FOR IP): Performed by: INTERNAL MEDICINE

## 2025-04-15 PROCEDURE — 2500000003 HC RX 250 WO HCPCS: Performed by: INTERNAL MEDICINE

## 2025-04-15 PROCEDURE — 94761 N-INVAS EAR/PLS OXIMETRY MLT: CPT

## 2025-04-15 PROCEDURE — 36600 WITHDRAWAL OF ARTERIAL BLOOD: CPT

## 2025-04-15 PROCEDURE — 94660 CPAP INITIATION&MGMT: CPT

## 2025-04-15 PROCEDURE — 2060000000 HC ICU INTERMEDIATE R&B

## 2025-04-15 PROCEDURE — 84466 ASSAY OF TRANSFERRIN: CPT

## 2025-04-15 PROCEDURE — 5A09357 ASSISTANCE WITH RESPIRATORY VENTILATION, LESS THAN 24 CONSECUTIVE HOURS, CONTINUOUS POSITIVE AIRWAY PRESSURE: ICD-10-PCS

## 2025-04-15 PROCEDURE — 82803 BLOOD GASES ANY COMBINATION: CPT

## 2025-04-15 PROCEDURE — 80048 BASIC METABOLIC PNL TOTAL CA: CPT

## 2025-04-15 PROCEDURE — 94640 AIRWAY INHALATION TREATMENT: CPT

## 2025-04-15 PROCEDURE — 2580000003 HC RX 258: Performed by: INTERNAL MEDICINE

## 2025-04-15 PROCEDURE — 5A0935A ASSISTANCE WITH RESPIRATORY VENTILATION, LESS THAN 24 CONSECUTIVE HOURS, HIGH NASAL FLOW/VELOCITY: ICD-10-PCS | Performed by: INTERNAL MEDICINE

## 2025-04-15 PROCEDURE — 6360000002 HC RX W HCPCS: Performed by: INTERNAL MEDICINE

## 2025-04-15 PROCEDURE — 36415 COLL VENOUS BLD VENIPUNCTURE: CPT

## 2025-04-15 PROCEDURE — 2700000000 HC OXYGEN THERAPY PER DAY

## 2025-04-15 PROCEDURE — 87449 NOS EACH ORGANISM AG IA: CPT

## 2025-04-15 PROCEDURE — 87899 AGENT NOS ASSAY W/OPTIC: CPT

## 2025-04-15 PROCEDURE — 86140 C-REACTIVE PROTEIN: CPT

## 2025-04-15 PROCEDURE — 85652 RBC SED RATE AUTOMATED: CPT

## 2025-04-15 PROCEDURE — 83540 ASSAY OF IRON: CPT

## 2025-04-15 RX ORDER — IPRATROPIUM BROMIDE AND ALBUTEROL SULFATE 2.5; .5 MG/3ML; MG/3ML
1 SOLUTION RESPIRATORY (INHALATION)
Status: DISCONTINUED | OUTPATIENT
Start: 2025-04-15 | End: 2025-04-19

## 2025-04-15 RX ORDER — CIPROFLOXACIN HYDROCHLORIDE 3.5 MG/ML
1 SOLUTION/ DROPS TOPICAL
Status: DISCONTINUED | OUTPATIENT
Start: 2025-04-15 | End: 2025-04-15

## 2025-04-15 RX ORDER — ENOXAPARIN SODIUM 100 MG/ML
30 INJECTION SUBCUTANEOUS DAILY
Status: DISCONTINUED | OUTPATIENT
Start: 2025-04-15 | End: 2025-04-19

## 2025-04-15 RX ADMIN — TROSPIUM CHLORIDE 20 MG: 20 TABLET, FILM COATED ORAL at 21:11

## 2025-04-15 RX ADMIN — WATER 60 MG: 1 INJECTION INTRAMUSCULAR; INTRAVENOUS; SUBCUTANEOUS at 22:46

## 2025-04-15 RX ADMIN — ATORVASTATIN CALCIUM 20 MG: 20 TABLET, FILM COATED ORAL at 21:11

## 2025-04-15 RX ADMIN — WATER 60 MG: 1 INJECTION INTRAMUSCULAR; INTRAVENOUS; SUBCUTANEOUS at 17:41

## 2025-04-15 RX ADMIN — ASPIRIN 81 MG: 81 TABLET, COATED ORAL at 21:11

## 2025-04-15 RX ADMIN — MEMANTINE 5 MG: 10 TABLET ORAL at 11:05

## 2025-04-15 RX ADMIN — PIPERACILLIN AND TAZOBACTAM 3375 MG: 3; .375 INJECTION, POWDER, LYOPHILIZED, FOR SOLUTION INTRAVENOUS at 14:48

## 2025-04-15 RX ADMIN — SODIUM CHLORIDE, PRESERVATIVE FREE 10 ML: 5 INJECTION INTRAVENOUS at 21:20

## 2025-04-15 RX ADMIN — AZITHROMYCIN 500 MG: 250 TABLET, FILM COATED ORAL at 11:05

## 2025-04-15 RX ADMIN — CARBIDOPA AND LEVODOPA 1 TABLET: 25; 100 TABLET, EXTENDED RELEASE ORAL at 11:05

## 2025-04-15 RX ADMIN — QUETIAPINE FUMARATE 25 MG: 25 TABLET ORAL at 17:39

## 2025-04-15 RX ADMIN — PIPERACILLIN AND TAZOBACTAM 3375 MG: 3; .375 INJECTION, POWDER, LYOPHILIZED, FOR SOLUTION INTRAVENOUS at 06:51

## 2025-04-15 RX ADMIN — SODIUM CHLORIDE: 0.9 INJECTION, SOLUTION INTRAVENOUS at 22:00

## 2025-04-15 RX ADMIN — MEMANTINE 5 MG: 10 TABLET ORAL at 21:11

## 2025-04-15 RX ADMIN — POTASSIUM CHLORIDE 40 MEQ: 1500 TABLET, EXTENDED RELEASE ORAL at 11:05

## 2025-04-15 RX ADMIN — CARBIDOPA AND LEVODOPA 1 TABLET: 25; 100 TABLET, EXTENDED RELEASE ORAL at 21:11

## 2025-04-15 RX ADMIN — DONEPEZIL HYDROCHLORIDE 10 MG: 5 TABLET, FILM COATED ORAL at 21:11

## 2025-04-15 RX ADMIN — ENOXAPARIN SODIUM 30 MG: 100 INJECTION SUBCUTANEOUS at 14:48

## 2025-04-15 RX ADMIN — PANTOPRAZOLE SODIUM 40 MG: 40 TABLET, DELAYED RELEASE ORAL at 06:48

## 2025-04-15 RX ADMIN — MELATONIN 6 MG: at 21:12

## 2025-04-15 RX ADMIN — FINASTERIDE 5 MG: 5 TABLET, FILM COATED ORAL at 11:05

## 2025-04-15 RX ADMIN — BUDESONIDE 500 MCG: 0.5 INHALANT RESPIRATORY (INHALATION) at 07:45

## 2025-04-15 RX ADMIN — BUDESONIDE 500 MCG: 0.5 INHALANT RESPIRATORY (INHALATION) at 20:53

## 2025-04-15 RX ADMIN — AMLODIPINE BESYLATE 5 MG: 5 TABLET ORAL at 21:11

## 2025-04-15 RX ADMIN — IPRATROPIUM BROMIDE AND ALBUTEROL SULFATE 1 DOSE: 2.5; .5 SOLUTION RESPIRATORY (INHALATION) at 02:56

## 2025-04-15 RX ADMIN — IPRATROPIUM BROMIDE AND ALBUTEROL SULFATE 1 DOSE: 2.5; .5 SOLUTION RESPIRATORY (INHALATION) at 07:38

## 2025-04-15 RX ADMIN — PIPERACILLIN AND TAZOBACTAM 3375 MG: 3; .375 INJECTION, POWDER, LYOPHILIZED, FOR SOLUTION INTRAVENOUS at 22:02

## 2025-04-15 RX ADMIN — SERTRALINE 50 MG: 50 TABLET, FILM COATED ORAL at 11:05

## 2025-04-15 RX ADMIN — IPRATROPIUM BROMIDE AND ALBUTEROL SULFATE 1 DOSE: 2.5; .5 SOLUTION RESPIRATORY (INHALATION) at 20:48

## 2025-04-15 RX ADMIN — TAMSULOSIN HYDROCHLORIDE 0.4 MG: 0.4 CAPSULE ORAL at 11:05

## 2025-04-15 RX ADMIN — POTASSIUM CHLORIDE 40 MEQ: 1500 TABLET, EXTENDED RELEASE ORAL at 21:18

## 2025-04-15 ASSESSMENT — PAIN SCALES - GENERAL: PAINLEVEL_OUTOF10: 0

## 2025-04-15 NOTE — PROGRESS NOTES
Hospitalist Progress Note    NAME:   Jonatan Rausch Jr.   : 1945   MRN: [id number]     Date/Time: 2025 11:16 PM  Patient PCP: Kenny Barksdale MD    Estimated discharge date:  Barriers: Improvement in respiratory status      Assessment / Plan:  Acute hypoxic respiratory failure  -Send patient is significant hypoxic on 5 L oxygen, despite being treated with IV Lasix for the last few days and broadened antibiotics spectrum  Zosyn and Zithromax,   - Initial CT showed multifocal pneumonia, patient has leukocytosis, however procalcitonin level is very low  -Initially CT showed bilateral pleural effusion,   BNP of 13,000  - Started on Lasix 40 mg twice daily on 4/10 , switch Rocephin to Zosyn  on   -Echo preliminary report of EF of 23%  -Respiratory panel negative, added metolazone 5 mg daily on   - Appreciate pulmonary consult  Follow-up CT on   showed  Decreased bilateral pleural effusions with increased parenchymal groundglass  throughout the lungs bilaterally right greater than left, suggestive of edema,  less likely diffuse alveolar infection.  Consulted cardiology on   wife requested Daily update    Intermittent dysphagia  - Consulted with GI  -Barium swallow 24  -There is no evidence of esophageal stricture or significant esophageal dysmotility.  - Given patient suffering from pneumonia and dysphagia is intermittent without pain, recommend outpatient follow-up    History of Parkinson's disease  Dementia  --Wife reports progression of disease  -- Continue Sinemet  -- Continue Aricept and Namenda  PT OT evaluation once cleared by GI     Essential hypertension  Hyperlipidemia  --Fairly stable     History of coronary artery disease status post CABG  -- Fairly stable        Code status:Full    Medical Decision Making:   I personally reviewed labs:  I personally reviewed imaging:  I personally reviewed EKG:  Toxic drug monitoring:   Discussed case with:         Code Status:   DVT

## 2025-04-15 NOTE — PROGRESS NOTES
Comprehensive Nutrition Assessment    Type and Reason for Visit:  Initial, LOS    Nutrition Recommendations/Plan:   Continue current diet and supplements     Malnutrition Assessment:  Malnutrition Status:  Moderate malnutrition (04/15/25 0785)    Context:  Acute Illness     Findings of the 6 clinical characteristics of malnutrition:  Energy Intake:  50% or less of estimated energy requirements for 5 or more days  Weight Loss:  Unable to assess     Body Fat Loss:  Mild body fat loss Orbital   Muscle Mass Loss:  Mild muscle mass loss Clavicles (pectoralis & deltoids), Temples (temporalis)  Fluid Accumulation:  No fluid accumulation     Strength:  Not Performed    Nutrition Assessment:    Patient medically noted for acute respiratory failure, CHF, pneumonia, intermittent dysphagia, Parkinson's disease, Dementia, HTN, and CAD. Chart reviewed for length of stay. Patient in bed at time of visit; RN at bedside but no family present. Patient reports a poor appetite; RN confirms he hasn't been eating much. Consumed a popsicle earlier. States he likes chocolate ensure shakes. He's unsure of weight loss but states he had a poor appetite PTA as well. Continue ONS as ordered; encourage intake of meals and honor preferences as able.     Nutrition Related Findings:    Labs reviewed   BM 4/15   Norvasc, Atorvastatin, Sinemet, Aricept, Namenda, Protonix, Glycolax, KCl, Seroquel, Zoloft   Wound Type: None       Current Nutrition Intake & Therapies:    Average Meal Intake: 1-25%     ADULT DIET; Regular; GI [place] (GERD/Peptic Ulcer)  ADULT ORAL NUTRITION SUPPLEMENT; Breakfast, Lunch, Dinner; Standard High Calorie/High Protein Oral Supplement    Anthropometric Measures:  Height: 167.6 cm (5' 6\")  Ideal Body Weight (IBW): 142 lbs (65 kg)       Current Body Weight: 82.6 kg (182 lb 1.6 oz),   IBW.    Current BMI (kg/m2): 29.4                             BMI Categories: Overweight (BMI 25.0-29.9)    Estimated Daily Nutrient

## 2025-04-15 NOTE — PROGRESS NOTES
Occupational Therapy     Chart reviewed pt in prep for OT treatment session. RN in w/ pt and BP low at the time (85/66) in bed, which is new for this pt, RN currently trying to get in touch w/ MD to address BP situation. Furthermore, pt on 4L and satting at 88%, with PLB education (which pt continues to need continued education for appropriate completion) able to elevate to 94% but required increased time and constant verbal cueing. Will defer and continue to follow pt.     Ladarius Hollingsworth, OTZOHRA, OTR/L

## 2025-04-15 NOTE — PROGRESS NOTES
1800 Bedside and Verbal shift change report given to BAR Sigala (oncoming nurse) by BAR Green (offgoing nurse). Report included the following information Nurse Handoff Report, Index, Intake/Output, MAR, Recent Results, and Cardiac Rhythm NSR/ST .     BAR Bah will be charting on this pt.     I have reviewed and agree with charting by BAR Bah.      Zakiya Cornejo RN

## 2025-04-15 NOTE — PROGRESS NOTES
Pulmonary Progress Note    Patient: Jonatan Rausch Jr.                     YOB: 1945        Date- 4/15/2025                           Admit Date: 4/7/2025       CC: Follow up for hypoxia, pulmonary edema and pleural effusions.        IMPRESSION & PLAN:     Acute hypoxic respiratory failure  Pulmonary edema and possible CAP  Bilateral pleural effusions  Sleep apnea, on CPAP  CHF    Plan    Acute hypoxia, patient became more hypoxic this afternoon.  O2 switched to nonrebreather mask.  Obtain ABG and chest x-ray.  Transfer to higher level of care and continue close monitoring.  May need BiPAP or higher ventilatory support if he gets worse.  worsening groundglass opacities with improved pleural effusions on chest CT 4/13.  Suspect infectious/inflammatory infiltrates in addition to cardiogenic component.  Add empirical steroids  Elevated CRP , continue  Zosyn and azithromycin.  Sputum cultures if he coughs up some and  f/u pneumonia antigens. Respiratory viral panel, blood cultures and procalcitonin level negative.  Repeat procalcitonin level.  DuoNeb nebulizations.  Cardiology notes reviewed, Jardiance and metoprolol added.    Case discussed with hospitalist.  Diuretics held due to PABLO    Critical care time spent > 40 min       Subjective:    Interval History:    O2 needs improved to 4 LPM.  No acute events overnight.    On diuretics, patient has put out about 1.1 L urine    Sed rate 32, pneumonia antigens pending and  CRP >9    Serum creatinine going up    Medications:  Current Facility-Administered Medications   Medication Dose Route Frequency Provider Last Rate Last Admin    metOLazone (ZAROXOLYN) tablet 5 mg  5 mg Oral Daily Kris Nino MD   5 mg at 04/14/25 1749    furosemide (LASIX) injection 40 mg  40 mg IntraVENous BID Ofelia Marr MD   40 mg at 04/14/25 2233    pantoprazole (PROTONIX) tablet 40 mg  40 mg Oral QAM AC Kris Nino MD   40 mg at 04/15/25 0648    potassium  04/15/25  0455    136 137   K 3.5 3.2* 3.8    97 99   CO2 29 31 29   BUN 29* 30* 47*   CREATININE 1.07 1.42* 2.48*       Imaging:    Xray Result (most recent):  XR CHEST PORTABLE 04/14/2025    Narrative  PORTABLE CHEST RADIOGRAPH/S: 4/14/2025 9:50 AM    INDICATION: Acute respiratory failure    COMPARISON: 4/11/2025, 4/28/2024; CT 4/13/2025.    TECHNIQUE: Portable frontal semiupright radiograph/s of the chest.    FINDINGS:  Pulmonary edema is stable from 4/11/2025 and increased from 4/9/2025. There is  interstitial and alveolar edema in the right upper lung and interstitial edema  elsewhere in the lungs. The lungs are hypoinflated. The central airways are  patent. No pneumothorax and no large pleural effusion. Post CABG.    Impression  Stable pulmonary edema.    Electronically signed by Ryan Marr MD  Pulmonology  [place], [place]  [phone]  4/15/2025

## 2025-04-15 NOTE — PROGRESS NOTES
1540 -- Notified by PCT that pt's SpO2 is in low to mid 80's. Pt has been on O2 @ 4L/Min via NC all day. Writer in pt's room and reminded pt to breath in through his nose. SpO2 went up to 86%, O2 increased to 6L/Min and SpO2 would come up to 91/92% but drop back down to 86-88%. Pt changed to non-rebreather and MD notified. Hospitalist  & pulm at bedside. ABG ordered and pt upgraded to PCU.     1646 -- Report called to BAR Green on PCU.     1708 -- Pt transferred by charge BAR Santiago and Rachel DINERO to PCU.

## 2025-04-15 NOTE — PROGRESS NOTES
4 Eyes Skin Assessment     NAME:  Jonatan Rausch Jr.  YOB: 1945  MEDICAL RECORD NUMBER:  [id number]    The patient is being assessed for  Transfer to New Unit    I agree that at least one RN has performed a thorough Head to Toe Skin Assessment on the patient. ALL assessment sites listed below have been assessed.      Areas assessed by both nurses:    Head, Face, Ears, Shoulders, Back, Chest, Arms, Elbows, Hands, Sacrum. Buttock, Coccyx, Ischium, and Legs. Feet and Heels        Does the Patient have a Wound? Yes wound(s) were present on assessment. LDA wound assessment was Initiated and completed by RN    Wound to skin tag on Right buttock. R thigh rash, L hand ecchymosis, gen. Scars, boggy heels.       Krishna Prevention initiated by RN: Yes  Wound Care Orders initiated by RN: Yes    Pressure Injury (Stage 3,4, Unstageable, DTI, NWPT, and Complex wounds) if present, place Wound referral order by RN under : Yes    New Ostomies, if present place, Ostomy referral order under : No     Nurse 1 eSignature: Electronically signed by Peter Portillo RN on 4/15/25 at 7:01 PM EDT    **SHARE this note so that the co-signing nurse can place an eSignature**    Nurse 2 eSignature: {Esignature:[id number]}

## 2025-04-15 NOTE — PROGRESS NOTES
TRANSFER - IN REPORT:    Verbal report received from Bri RN on Jonatan Rausch Jr.  being received from Criss Phillip for routine progression of patient care      Report consisted of patient's Situation, Background, Assessment and   Recommendations(SBAR).     Information from the following report(s) Nurse Handoff Report was reviewed with the receiving nurse.    Opportunity for questions and clarification was provided.      Assessment completed upon patient's arrival to unit and care assumed.

## 2025-04-15 NOTE — PROGRESS NOTES
Spiritual Care Partner Volunteer visited patient at [facility] in [facility] 3 MED TELE on 4/15/2025   Documented by: Chaplain Demarco Arora M.Div., [facility].   Paging Service: [phone])

## 2025-04-15 NOTE — PROGRESS NOTES
Pharmacist note:   [employer]-Approved DVT / VTE Prophylaxis Dosing    Creatinine jumped 1.42 > 2.48      Per [facility] -approved protocol Enoxparin 40mg/day  has been adjusted to Enoxaparin 30mg sc daily   based on weight and renal function as shown in the table below.           Wt Readings from Last 1 Encounters:   04/14/25 82.6 kg (182 lb)       Creatinine Clearance Estimate:    Serum creatinine: 2.48 mg/dL (H) 04/15/25 0455  Estimated creatinine clearance: 23 mL/min (A)      ENOXAPARIN/Heparin  ROUTINE PROPHYLAXIS DOSING (Medically ill, routine surgery)                                                                                                                                                                                                                                Estimated CrCl (mL/min) Patient Weight (Kg)     <= 50.9 Kg .9 Kg 101-150.9 Kg 151-174.9 Kg >= 175 Kg    30 or greater 30 mg  SC  Daily 40mg  SC   Daily  (Or 30mg BID for orthopedic cases) 30mg   SC  Q12h 40 mg  SC  Q12h 60mg   SC  Q12h    15-29 UFH 5000 units SC  Q12h 30 mg   SC  Daily 30 mg   SC  Daily 40 mg   SC  Daily 60 mg  SC   Daily    < 15 or Dialysis UHF 5000 units SC  Q12h UFH 5000 units SC   Q8h UFH 7500 units SC Q8h       SC = Subcutaneously         UFH= Unfractionated Heparin          (A) = Adjusted Body Wt.      ERICA CAMPOVERDE, [facility]

## 2025-04-15 NOTE — PROGRESS NOTES
Pulmonary Progress Note    Patient: Jonatan Rausch Jr.                     YOB: 1945        Date- 4/15/2025                           Admit Date: 4/7/2025       CC: Follow up for hypoxia, pulmonary edema and pleural effusions.        IMPRESSION & PLAN:     Acute hypoxic respiratory failure  Pulmonary edema and possible CAP  Bilateral pleural effusions  Sleep apnea, on CPAP  CHF    Plan    Acute hypoxia, currently requiring O2 6 LPM.  Wean O2 gradually as tolerated.  Worsening groundglass opacities with improved pleural effusions on chest CT 4/13.  Suggestive of cardiogenic edema   Mild PABLO, decrease Lasix dose.    Negative respiratory viral panel, blood cultures and procalcitonin level.  On Zosyn and azithromycin.  Obtain sputum cultures, CRP, sed rate and pneumonia antigens.  Cardiology notes reviewed, Jardiance and metoprolol started for CHF and diuresis continued  CPAP therapy at night.    Total time on encounter > 50 min        Subjective:    Interval History:    Chart reviewed, admission with hypoxia, pulmonary infiltrates and pleural effusions noted.     Shortness of breath unchanged.  Reports cough with thick white sputum.    Remains on O2 6 LPM.    On diuretics, patient has put out about 2.3 L urine and has not net negative for about 1800 cc    Patient is also on vancomycin and Zosyn.  Patient is afebrile.  Mild tachycardia last night.    Serum creatinine 1.4, trending up    White count 14K, not significantly changed in this admission.  Blood cultures x 2 no growth.  Procalcitonin normal.  Respiratory viral panel, mycoplasma Chlamydophila and Bordetella PCRs negative.  Admission BNP close to 14K    Echo results pending.    Medications:  Current Facility-Administered Medications   Medication Dose Route Frequency Provider Last Rate Last Admin    metOLazone (ZAROXOLYN) tablet 5 mg  5 mg Oral Daily Kris Nino MD   5 mg at 04/14/25 2965    furosemide (LASIX) injection 40 mg  40 mg

## 2025-04-16 ENCOUNTER — APPOINTMENT (OUTPATIENT)
Facility: HOSPITAL | Age: 80
DRG: 871 | End: 2025-04-16
Payer: MEDICARE

## 2025-04-16 LAB
ANION GAP SERPL CALC-SCNC: 7 MMOL/L (ref 2–12)
BASOPHILS # BLD: 0.01 K/UL (ref 0–0.1)
BASOPHILS NFR BLD: 0.1 % (ref 0–1)
BUN SERPL-MCNC: 61 MG/DL (ref 6–20)
BUN/CREAT SERPL: 22 (ref 12–20)
CALCIUM SERPL-MCNC: 8.6 MG/DL (ref 8.5–10.1)
CHLORIDE SERPL-SCNC: 101 MMOL/L (ref 97–108)
CO2 SERPL-SCNC: 28 MMOL/L (ref 21–32)
CREAT SERPL-MCNC: 2.81 MG/DL (ref 0.7–1.3)
CREAT UR-MCNC: 54.1 MG/DL
CREAT UR-MCNC: 54.8 MG/DL
DIFFERENTIAL METHOD BLD: ABNORMAL
EOSINOPHIL # BLD: 0 K/UL (ref 0–0.4)
EOSINOPHIL NFR BLD: 0 % (ref 0–7)
ERYTHROCYTE [DISTWIDTH] IN BLOOD BY AUTOMATED COUNT: 16.2 % (ref 11.5–14.5)
FLUID CULTURE: NORMAL
GLUCOSE SERPL-MCNC: 151 MG/DL (ref 65–100)
HCT VFR BLD AUTO: 32.9 % (ref 36.6–50.3)
HGB BLD-MCNC: 10.4 G/DL (ref 12.1–17)
IMM GRANULOCYTES # BLD AUTO: 0.05 K/UL (ref 0–0.04)
IMM GRANULOCYTES NFR BLD AUTO: 0.4 % (ref 0–0.5)
L PNEUMO1 AG UR QL IA: NEGATIVE
LYMPHOCYTES # BLD: 0.36 K/UL (ref 0.8–3.5)
LYMPHOCYTES NFR BLD: 3.2 % (ref 12–49)
Lab: NORMAL
MCH RBC QN AUTO: 26.1 PG (ref 26–34)
MCHC RBC AUTO-ENTMCNC: 31.6 G/DL (ref 30–36.5)
MCV RBC AUTO: 82.7 FL (ref 80–99)
MONOCYTES # BLD: 0.19 K/UL (ref 0–1)
MONOCYTES NFR BLD: 1.7 % (ref 5–13)
NEUTS SEG # BLD: 10.69 K/UL (ref 1.8–8)
NEUTS SEG NFR BLD: 94.6 % (ref 32–75)
NRBC # BLD: 0 K/UL (ref 0–0.01)
NRBC BLD-RTO: 0 PER 100 WBC
ORGANISM ID: NORMAL
PLATELET # BLD AUTO: 269 K/UL (ref 150–400)
PMV BLD AUTO: 11.3 FL (ref 8.9–12.9)
POTASSIUM SERPL-SCNC: 4.8 MMOL/L (ref 3.5–5.1)
PROCALCITONIN SERPL-MCNC: 0.16 NG/ML
PROT UR-MCNC: 17 MG/DL (ref 0–11.9)
PROT/CREAT UR-RTO: 0.3
RBC # BLD AUTO: 3.98 M/UL (ref 4.1–5.7)
RBC MORPH BLD: ABNORMAL
S PNEUM AG SPEC QL LA: NEGATIVE
SODIUM SERPL-SCNC: 136 MMOL/L (ref 136–145)
SPECIMEN SOURCE: NORMAL
SPECIMEN SOURCE: NORMAL
SPECIMEN: NORMAL
WBC # BLD AUTO: 11.3 K/UL (ref 4.1–11.1)

## 2025-04-16 PROCEDURE — 2500000003 HC RX 250 WO HCPCS: Performed by: INTERNAL MEDICINE

## 2025-04-16 PROCEDURE — 82570 ASSAY OF URINE CREATININE: CPT

## 2025-04-16 PROCEDURE — 85025 COMPLETE CBC W/AUTO DIFF WBC: CPT

## 2025-04-16 PROCEDURE — 6370000000 HC RX 637 (ALT 250 FOR IP): Performed by: INTERNAL MEDICINE

## 2025-04-16 PROCEDURE — 6360000002 HC RX W HCPCS: Performed by: INTERNAL MEDICINE

## 2025-04-16 PROCEDURE — 82436 ASSAY OF URINE CHLORIDE: CPT

## 2025-04-16 PROCEDURE — 2700000000 HC OXYGEN THERAPY PER DAY

## 2025-04-16 PROCEDURE — 2580000003 HC RX 258: Performed by: INTERNAL MEDICINE

## 2025-04-16 PROCEDURE — 51798 US URINE CAPACITY MEASURE: CPT

## 2025-04-16 PROCEDURE — 80048 BASIC METABOLIC PNL TOTAL CA: CPT

## 2025-04-16 PROCEDURE — 71045 X-RAY EXAM CHEST 1 VIEW: CPT

## 2025-04-16 PROCEDURE — 84300 ASSAY OF URINE SODIUM: CPT

## 2025-04-16 PROCEDURE — 94640 AIRWAY INHALATION TREATMENT: CPT

## 2025-04-16 PROCEDURE — P9047 ALBUMIN (HUMAN), 25%, 50ML: HCPCS | Performed by: INTERNAL MEDICINE

## 2025-04-16 PROCEDURE — 84145 PROCALCITONIN (PCT): CPT

## 2025-04-16 PROCEDURE — 94660 CPAP INITIATION&MGMT: CPT

## 2025-04-16 PROCEDURE — 84156 ASSAY OF PROTEIN URINE: CPT

## 2025-04-16 PROCEDURE — 36415 COLL VENOUS BLD VENIPUNCTURE: CPT

## 2025-04-16 PROCEDURE — 2060000000 HC ICU INTERMEDIATE R&B

## 2025-04-16 RX ORDER — ALBUMIN (HUMAN) 12.5 G/50ML
25 SOLUTION INTRAVENOUS EVERY 8 HOURS
Status: COMPLETED | OUTPATIENT
Start: 2025-04-16 | End: 2025-04-17

## 2025-04-16 RX ADMIN — BUDESONIDE 500 MCG: 0.5 INHALANT RESPIRATORY (INHALATION) at 20:55

## 2025-04-16 RX ADMIN — ENOXAPARIN SODIUM 30 MG: 100 INJECTION SUBCUTANEOUS at 10:04

## 2025-04-16 RX ADMIN — MEMANTINE 5 MG: 10 TABLET ORAL at 12:14

## 2025-04-16 RX ADMIN — IPRATROPIUM BROMIDE AND ALBUTEROL SULFATE 1 DOSE: 2.5; .5 SOLUTION RESPIRATORY (INHALATION) at 07:37

## 2025-04-16 RX ADMIN — TAMSULOSIN HYDROCHLORIDE 0.4 MG: 0.4 CAPSULE ORAL at 12:14

## 2025-04-16 RX ADMIN — CARBIDOPA AND LEVODOPA 1 TABLET: 25; 100 TABLET, EXTENDED RELEASE ORAL at 19:54

## 2025-04-16 RX ADMIN — IPRATROPIUM BROMIDE AND ALBUTEROL SULFATE 1 DOSE: 2.5; .5 SOLUTION RESPIRATORY (INHALATION) at 16:35

## 2025-04-16 RX ADMIN — ATORVASTATIN CALCIUM 20 MG: 20 TABLET, FILM COATED ORAL at 19:53

## 2025-04-16 RX ADMIN — SODIUM CHLORIDE: 0.9 INJECTION, SOLUTION INTRAVENOUS at 20:30

## 2025-04-16 RX ADMIN — WATER 60 MG: 1 INJECTION INTRAMUSCULAR; INTRAVENOUS; SUBCUTANEOUS at 05:14

## 2025-04-16 RX ADMIN — WATER 60 MG: 1 INJECTION INTRAMUSCULAR; INTRAVENOUS; SUBCUTANEOUS at 17:21

## 2025-04-16 RX ADMIN — TROSPIUM CHLORIDE 20 MG: 20 TABLET, FILM COATED ORAL at 19:53

## 2025-04-16 RX ADMIN — FINASTERIDE 5 MG: 5 TABLET, FILM COATED ORAL at 12:14

## 2025-04-16 RX ADMIN — PIPERACILLIN AND TAZOBACTAM 3375 MG: 3; .375 INJECTION, POWDER, LYOPHILIZED, FOR SOLUTION INTRAVENOUS at 14:29

## 2025-04-16 RX ADMIN — PIPERACILLIN AND TAZOBACTAM 3375 MG: 3; .375 INJECTION, POWDER, LYOPHILIZED, FOR SOLUTION INTRAVENOUS at 05:20

## 2025-04-16 RX ADMIN — PIPERACILLIN AND TAZOBACTAM 3375 MG: 3; .375 INJECTION, POWDER, LYOPHILIZED, FOR SOLUTION INTRAVENOUS at 20:31

## 2025-04-16 RX ADMIN — CARBIDOPA AND LEVODOPA 1 TABLET: 25; 100 TABLET, EXTENDED RELEASE ORAL at 12:15

## 2025-04-16 RX ADMIN — POTASSIUM CHLORIDE 40 MEQ: 1500 TABLET, EXTENDED RELEASE ORAL at 19:54

## 2025-04-16 RX ADMIN — MEMANTINE 5 MG: 10 TABLET ORAL at 19:53

## 2025-04-16 RX ADMIN — QUETIAPINE FUMARATE 25 MG: 25 TABLET ORAL at 17:21

## 2025-04-16 RX ADMIN — IPRATROPIUM BROMIDE AND ALBUTEROL SULFATE 1 DOSE: 2.5; .5 SOLUTION RESPIRATORY (INHALATION) at 11:59

## 2025-04-16 RX ADMIN — MELATONIN 6 MG: at 19:54

## 2025-04-16 RX ADMIN — SERTRALINE 50 MG: 50 TABLET, FILM COATED ORAL at 12:14

## 2025-04-16 RX ADMIN — POTASSIUM CHLORIDE 40 MEQ: 1500 TABLET, EXTENDED RELEASE ORAL at 12:14

## 2025-04-16 RX ADMIN — AZITHROMYCIN 500 MG: 250 TABLET, FILM COATED ORAL at 12:14

## 2025-04-16 RX ADMIN — SODIUM CHLORIDE, PRESERVATIVE FREE 10 ML: 5 INJECTION INTRAVENOUS at 10:06

## 2025-04-16 RX ADMIN — ALBUMIN (HUMAN) 25 G: 0.25 INJECTION, SOLUTION INTRAVENOUS at 17:19

## 2025-04-16 RX ADMIN — BUDESONIDE 500 MCG: 0.5 INHALANT RESPIRATORY (INHALATION) at 07:34

## 2025-04-16 RX ADMIN — IPRATROPIUM BROMIDE AND ALBUTEROL SULFATE 1 DOSE: 2.5; .5 SOLUTION RESPIRATORY (INHALATION) at 20:55

## 2025-04-16 RX ADMIN — ASPIRIN 81 MG: 81 TABLET, COATED ORAL at 19:53

## 2025-04-16 RX ADMIN — SODIUM CHLORIDE, PRESERVATIVE FREE 10 ML: 5 INJECTION INTRAVENOUS at 19:54

## 2025-04-16 RX ADMIN — WATER 60 MG: 1 INJECTION INTRAMUSCULAR; INTRAVENOUS; SUBCUTANEOUS at 10:05

## 2025-04-16 RX ADMIN — DONEPEZIL HYDROCHLORIDE 10 MG: 5 TABLET, FILM COATED ORAL at 19:53

## 2025-04-16 RX ADMIN — PANTOPRAZOLE SODIUM 40 MG: 40 TABLET, DELAYED RELEASE ORAL at 05:14

## 2025-04-16 ASSESSMENT — PAIN SCALES - GENERAL
PAINLEVEL_OUTOF10: 0

## 2025-04-16 NOTE — PROGRESS NOTES
04/15/25 2148   Oxygen Therapy/Pulse Ox   O2 Therapy Oxygen humidified   O2 Device Heated high flow cannula   O2 Flow Rate (L/min) (S)  55 L/min  (increased due to desat. Primary RN aware)   FiO2  (S)  95 %  (Increased due to desat, primary RN aware)   Humidification Source Heated wire   Humidification Temp 34   Humidification Temp Measured 34   Circuit Condensation Not drained   Pulse Oximeter Device Mode Continuous

## 2025-04-16 NOTE — PROGRESS NOTES
Hospitalist Progress Note    NAME:   Jonatan Rausch Jr.   : 1945   MRN: [id number]     Date/Time: 2025 11:56 AM  Patient PCP: Kenny Barksdale MD    Estimated discharge date:  Barriers: Improvement in respiratory status      Assessment / Plan:  Acute hypoxic respiratory failure worsening today  Acute on chronic HFrEF vs Pneumonia  - Initial CT showed multifocal pneumonia BL effusions, with low procalcitonin level and leukocytosis  - BNP of 13,000, echocardiogram showed EF of 23 percent  - Started on Lasix 40 mg twice daily on 4/10 , switch Rocephin to Zosyn  on , Lasix has been may held  due to rising creatinine  -Echo preliminary report of EF 15-20%  -Respiratory panel negative,   - metolazone 5 mg daily on , discontinue metolazone on  due to PABLO  - Repeat CT on   showed: Decreased bilateral pleural effusions with increased parenchymal groundglass throughout the lungs bilaterally right greater than left, suggestive of edema, less likely diffuse alveolar infection.  -Consulted cardiology on   -IV lasix held due to bump Cr  -Continue IV zosyn and zithromax  -Continue IV steroids  -Check MRSA screen.  Discussed with Pulmonary  -HFNC, NIV prn      PABLO  -overdiuresed vs ATN vs Cardiorenal  -CR 1.0-->1.4-->2.4-->2.8  -holding lasix  -bladder scan prn  -Nephrology input    Intermittent dysphagia  - Consulted with GI  -Barium swallow 24  -There is no evidence of esophageal stricture or significant esophageal dysmotility.  - Given patient suffering from pneumonia and dysphagia is intermittent without pain, recommend outpatient follow-up    History of Parkinson's disease  Dementia  --Wife reports progression of disease  -- Continue Sinemet  -- Continue Aricept and Namenda  PT OT evaluation once cleared by GI     Essential hypertension  Hyperlipidemia  --Fairly stable     History of coronary artery disease status post CABG  -- Fairly stable        Code status:Full    Medical  bilaterally, no wheezing or rales.  Tachypneic  CV:  Regular  rhythm,  No edema  GI:  Soft, Non distended, Non tender.  +Bowel sounds  Neurologic:  Alert , normal speech,   Psych:          Not anxious nor agitated  Skin:  No rashes.  No jaundice    Reviewed most current lab test results and cultures  YES  Reviewed most current radiology test results   YES  Review and summation of old records today    NO  Reviewed patient's current orders and MAR    YES  PMH/SH reviewed - no change compared to H&P    Procedures: see electronic medical records for all procedures/Xrays and details which were not copied into this note but were reviewed prior to creation of Plan.      LABS:  I reviewed today's most current labs and imaging studies.  Pertinent labs include:  Recent Labs     04/14/25 0451 04/16/25 0227   WBC 14.4* 11.3*   HGB 10.9* 10.4*   HCT 34.6* 32.9*    269     Recent Labs     04/14/25  0451 04/15/25  0455 04/16/25  0227    137 136   K 3.2* 3.8 4.8   CL 97 99 101   CO2 31 29 28   GLUCOSE 123* 117* 151*   BUN 30* 47* 61*   CREATININE 1.42* 2.48* 2.81*   CALCIUM 9.1 8.9 8.6       Signed: Rodrigue Nichols MD

## 2025-04-16 NOTE — PROGRESS NOTES
Chart reviewed and spoke with nurse in preparation for PT session. Nurse requests therapy follow up later/give patient more time on CPAP due to desaturations requiring increased supplemental oxygen overnight. Will follow up as medically appropriate and available.       Laila Hannah, PT , DPT

## 2025-04-16 NOTE — CONSULTS
Nephrology Consult Note     [facility]                Phone - [phone]   Patient: Jonatan Rausch Jr.   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 *      [x] High complexity decision making was performed  [x] Patient is at high-risk of decompensation with multiple organ involvement    Subjective:   HPI: Jonatan Rausch Jr. is a 80 y.o. male is admitted with   Chief Complaint   Patient presents with    Shortness of Breath     Pt ambulatory with cane to triage, reports SOB ongoing x many years, worsening last night. Pt's wife report that his work of breathing significantly increased last night and she was concerned he would pass out. Patient hx of dementia, cardiac stents, parkinsons.     .    He has developed PABLO with creatinine-2.81  His creatinine level was 1.2 on April 12, 2025  Creatinine 1.17 on April 7, 2025 on the day of admission  Creatinine 1.14 in April 2024  He was admitted with respiratory failure  He has been treated with IV diuretics for CHF--Lasix and metolazone.  He has received Zosyn.  For pneumonia  He still hypoxic requiring high flow oxygen  Had he had a CT chest with IV contrast on April 7, 2025  He is not able to give much

## 2025-04-16 NOTE — PROGRESS NOTES
status:Full    Medical Decision Making:   I personally reviewed labs:  I personally reviewed imaging:  I personally reviewed EKG:  Toxic drug monitoring:   Discussed case with:         Code Status:   DVT Prophylaxis:   GI Prophylaxis:    Subjective:     Chief Complaint / Reason for Physician Visit   Discussed with RN events overnight.       Objective:     VITALS:   Last 24hrs VS reviewed since prior progress note. Most recent are:  Patient Vitals for the past 24 hrs:   BP Temp Temp src Pulse Resp SpO2   04/15/25 2358 -- -- -- (!) 104 29 91 %   04/15/25 2300 101/76 99 °F (37.2 °C) Axillary (!) 104 30 96 %   04/15/25 2148 -- -- -- 97 (!) 31 90 %   04/15/25 2110 108/79 -- -- (!) 103 21 97 %   04/15/25 2103 -- -- -- (!) 102 23 97 %   04/15/25 2058 -- -- -- 99 20 98 %   04/15/25 2053 -- -- -- (!) 101 20 98 %   04/15/25 2048 -- -- -- 99 30 98 %   04/15/25 1935 100/75 98.6 °F (37 °C) Axillary 100 (!) 32 100 %   04/15/25 1814 -- -- -- (!) 101 (!) 31 --   04/15/25 1813 -- -- -- (!) 101 24 --   04/15/25 1724 110/75 98 °F (36.7 °C) Axillary -- -- 93 %   04/15/25 1717 -- -- -- (!) 102 18 --   04/15/25 1605 103/69 -- -- (!) 103 -- 94 %   04/15/25 1545 -- -- -- -- -- 90 %   04/15/25 1537 103/73 97.7 °F (36.5 °C) -- (!) 106 24 (!) 81 %   04/15/25 1300 114/78 -- -- 99 -- 91 %   04/15/25 1030 (!) 85/66 98.1 °F (36.7 °C) Oral 99 24 92 %   04/15/25 0800 98/64 98.2 °F (36.8 °C) Axillary 100 24 91 %   04/15/25 0738 -- -- -- 100 24 90 %   04/15/25 0515 -- -- -- -- -- 94 %   04/15/25 0308 -- -- -- (!) 101 24 91 %   04/15/25 0253 -- -- -- (!) 106 24 (!) 84 %   04/15/25 0215 -- -- -- -- -- 95 %         Intake/Output Summary (Last 24 hours) at 4/16/2025 0014  Last data filed at 4/15/2025 0525  Gross per 24 hour   Intake --   Output 300 ml   Net -300 ml        I had a face to face encounter and independently examined this patient on 4/16/2025, as outlined below:  PHYSICAL EXAM:  General: Alert, cooperative  EENT:  EOMI. Anicteric

## 2025-04-16 NOTE — CARE COORDINATION
Transition of Care Plan:    RUR: 14% medium Risk  Prior Level of Functioning: Independent in ADLs/IADLs  Disposition: home with follow up  FAM: 4/21  If SNF or IPR: Date FOC offered: na  Date FOC received:   Accepting facility:   Date authorization started with reference number:   Date authorization received and expires:   Follow up appointments: pcp/specialist   DME needed: none  Transportation at discharge: the patient's family  IM/IMM Medicare/ letter given: 2nd IM upon discharge  Is patient a [place] and connected with [facility]? na   If yes, was  transfer form completed and [facility] notified? na  Caregiver Contact:  shree luke (Spouse) [phone]  Discharge Caregiver contacted prior to discharge? The patient to contact  Care Conference needed? no  Barriers to discharge: medical clearance      PT/OT recommendation calls for Intermittent occupational therapy up to 2-3x/week in previous living setting with additional support needed for safety w/ ADL progression. Due to the patient's challenging geographic location, a mass referral to be made to secure a home health agency.     Pt resides with spouse in a one level home with 8 BEHZAD. Pt states he is independent with ADL's and is an active .        Tony Luke RN  Case Management  [phone]

## 2025-04-16 NOTE — PROGRESS NOTES
Occupational Therapy    Chart reviewed and spoke with nurse in preparation for OT session. Nurse requests therapy follow up later/give patient more time on CPAP due to desaturations requiring increased supplemental oxygen overnight. Will follow up as medically appropriate and available.    Martha Espino, OTR/L

## 2025-04-16 NOTE — PROGRESS NOTES
Pulmonary Progress Note    Patient: Jonatan Rausch Jr.                     YOB: 1945        Date- 4/16/2025                           Admit Date: 4/7/2025       CC: Follow up for hypoxia, pulmonary edema and pleural effusions.        IMPRESSION & PLAN:     Acute hypoxic respiratory failure  Pulmonary edema and possible CAP  Bilateral pleural effusions  Sleep apnea, on CPAP  PABLO  CHF    Plan    Acute hypoxic respiratory failure, on HFNC last night, currently on BiPAP on FiO2 70%, wean gradually as tolerated.  Continue close monitoring, may need ICU if respiratory status declines.  Improving pulmonary infiltrates, especially in the right upper lung and traces of effusions on personal review of chest x-ray this morning.  Suspect inflammatory/infectious infiltrates.  Continue IV steroids.  Elevated CRP , continue empirical Zosyn and azithromycin.   f/u pneumonia antigens. Respiratory viral panel, blood cultures and procalcitonin negative.  DuoNeb nebulizations.    Had a long discussion with wife about the events yesterday and the current plan.    Case discussed with hospitalist.      Total time of encounter > 50 min       Subjective:    Interval History:    Reports shortness of breath.  Not producing any sputum    Oxygen requirements are high.  Patient was managed with heated high flow last night, switched to BiPAP this morning.  Currently on FiO2 70%.    Patient was started on IV steroids yesterday.    Chest x-ray today shows some improvement in the pulmonary infiltrates and small pleural effusions.    ABG yesterday showed a pH of 7.5, pCO2 39, pO2 70, sats 95% on nonrebreather mask.    White count 11K with neutrophil predominance, BUN/creatinine 61/2.8, procalcitonin not indicating bacterial infection.      Medications:  Current Facility-Administered Medications   Medication Dose Route Frequency Provider Last Rate Last Admin    enoxaparin Sodium (LOVENOX) injection 30 mg  30 mg SubCUTAneous Daily

## 2025-04-16 NOTE — PROGRESS NOTES
End of Shift Note    Bedside shift change report given to Zakiya DINERO(oncoming nurse) by Peter Portillo, RN (offgoing nurse).  Report included the following information SBAR    Shift worked:  7a-7p     Shift summary and any significant changes:     Pt on and off CPAP for breathing support. Consult for Nephrology placed.      Concerns for physician to address:       Zone phone for oncoming shift:          Activity:  Level of Assistance: Moderate assist, patient does 50-74%  Number times ambulated in hallways past shift: 0  Number of times OOB to chair past shift: 0    Cardiac:   Cardiac Monitoring: Yes      Cardiac Rhythm: Sinus tachy    Access:  Current line(s): PIV     Genitourinary:   Urinary Status: Voiding, External catheter    Respiratory:   O2 Device: PAP (positive airway pressure)  Chronic home O2 use?: NO  Incentive spirometer at bedside: NO    GI:  Last BM (including prior to admit): 04/16/25  Current diet:  ADULT DIET; Regular; GI [place] (GERD/Peptic Ulcer)  ADULT ORAL NUTRITION SUPPLEMENT; Breakfast, Lunch, Dinner; Standard High Calorie/High Protein Oral Supplement  Passing flatus: YES    Pain Management:   Patient states pain is manageable on current regimen: YES    Skin:  Krishna Scale Score: 14  Interventions: Wound Offloading (Prevention Methods): Pillows, Repositioning    Patient Safety:  Fall Risk: Nursing Judgement-Fall Risk High(Add Comments): Yes  Fall Risk Interventions  Nursing Judgement-Fall Risk High(Add Comments): Yes  Toilet Every 2 Hours-In Advance of Need: Yes  Hourly Visual Checks: Eyes closed, In bed  Fall Visual Posted: Socks  Room Door Open: Yes  Alarm On: Bed  Patient Moved Closer to Nursing Station: No    Active Consults:   IP CONSULT TO GI  IP CONSULT TO PULMONOLOGY  IP CONSULT TO CARDIOLOGY  IP CONSULT TO CASE MANAGEMENT  IP CONSULT TO NEPHROLOGY    Length of Stay:  Expected LOS: 14  Actual LOS: 9    Peter Portillo, RN

## 2025-04-16 NOTE — PROGRESS NOTES
Bedside shift change report given to BAR Bah (oncoming nurse) by BAR Green (offgoing nurse). Report included the following information       End of Shift Note    Bedside shift change report given to BAR Green (oncoming nurse) by Niurka Buchanan RN (offgoing nurse).  Report included the following information SBAR, Kardex, Intake/Output, MAR, Recent Results, and Cardiac Rhythm NSR    Shift worked:  7p-7a     Shift summary and any significant changes:    Patient A&O x4. Patient put on C PAP over night at 70% while sleeping.   Lab work drawn. Hourly rounding and Q2 turns. Labs drawn.     Concerns for physician to address:  none     Zone phone for oncoming shift:          Activity:  Level of Assistance: Moderate assist, patient does 50-74%  Number times ambulated in hallways past shift: 0  Number of times OOB to chair past shift: 0    Cardiac:   Cardiac Monitoring: Yes      Cardiac Rhythm: Sinus tachy    Access:  Current line(s): PIV     Genitourinary:   Urinary Status: Voiding, External catheter, Incontinent    Respiratory:   O2 Device: PAP (positive airway pressure)  Chronic home O2 use?: YES  Incentive spirometer at bedside: YES    GI:  Last BM (including prior to admit): 04/15/25  Current diet:  ADULT DIET; Regular; GI [place] (GERD/Peptic Ulcer)  ADULT ORAL NUTRITION SUPPLEMENT; Breakfast, Lunch, Dinner; Standard High Calorie/High Protein Oral Supplement  ADULT ORAL NUTRITION SUPPLEMENT; Breakfast, Lunch, Dinner; Standard High Calorie/High Protein Oral Supplement  Passing flatus: YES    Pain Management:   Patient states pain is manageable on current regimen: YES    Skin:  Krishna Scale Score: 14  Interventions: Wound Offloading (Prevention Methods): Pillows, Repositioning    Patient Safety:  Fall Risk: Nursing Judgement-Fall Risk High(Add Comments): Yes  Fall Risk Interventions  Nursing Judgement-Fall Risk High(Add Comments): Yes  Toilet Every 2 Hours-In Advance of Need: Yes  Hourly Visual Checks: In bed,

## 2025-04-16 NOTE — PROGRESS NOTES
1900 Bedside and Verbal shift change report given to BAR Sigala (oncoming nurse) by BAR Green (offgoing nurse). Report included the following information Nurse Handoff Report, Index, Intake/Output, MAR, Recent Results, and Cardiac Rhythm NSR .     BAR Bah will be charting on this pt.     I have reviewed and agree with charting by BAR Bah.      Zakiya Cornejo RN

## 2025-04-17 LAB
-: NORMAL
ANION GAP SERPL CALC-SCNC: 7 MMOL/L (ref 2–12)
ARTERIAL PATENCY WRIST A: YES
BACTERIA SPEC CULT: NORMAL
BACTERIA SPEC CULT: NORMAL
BASE EXCESS BLDA CALC-SCNC: 2.2 MMOL/L
BDY SITE: ABNORMAL
BUN SERPL-MCNC: 78 MG/DL (ref 6–20)
BUN/CREAT SERPL: 34 (ref 12–20)
CALCIUM SERPL-MCNC: 9.1 MG/DL (ref 8.5–10.1)
CHLORIDE SERPL-SCNC: 105 MMOL/L (ref 97–108)
CHLORIDE UR-SCNC: 114 MMOL/L
CHLORIDE UR-SCNC: 88 MMOL/L
CO2 SERPL-SCNC: 26 MMOL/L (ref 21–32)
CREAT SERPL-MCNC: 2.31 MG/DL (ref 0.7–1.3)
EKG ATRIAL RATE: 109 BPM
EKG DIAGNOSIS: NORMAL
EKG P AXIS: 44 DEGREES
EKG P-R INTERVAL: 160 MS
EKG Q-T INTERVAL: 358 MS
EKG QRS DURATION: 108 MS
EKG QTC CALCULATION (BAZETT): 482 MS
EKG R AXIS: -9 DEGREES
EKG T AXIS: 153 DEGREES
EKG VENTRICULAR RATE: 109 BPM
ERYTHROCYTE [DISTWIDTH] IN BLOOD BY AUTOMATED COUNT: 16.5 % (ref 11.5–14.5)
ERYTHROCYTE [SEDIMENTATION RATE] IN BLOOD: 11 MM/HR (ref 0–20)
GLUCOSE SERPL-MCNC: 149 MG/DL (ref 65–100)
HAV IGM SER QL: NONREACTIVE
HBV CORE IGM SER QL: NONREACTIVE
HBV SURFACE AG SER QL: <0.1 INDEX
HBV SURFACE AG SER QL: NEGATIVE
HCO3 BLDA-SCNC: 27 MMOL/L (ref 22–26)
HCT VFR BLD AUTO: 28.4 % (ref 36.6–50.3)
HCV AB SER IA-ACNC: 0.15 INDEX
HCV AB SERPL QL IA: NONREACTIVE
HGB BLD-MCNC: 8.9 G/DL (ref 12.1–17)
MAGNESIUM SERPL-MCNC: 2.3 MG/DL (ref 1.6–2.4)
MCH RBC QN AUTO: 25.9 PG (ref 26–34)
MCHC RBC AUTO-ENTMCNC: 31.3 G/DL (ref 30–36.5)
MCV RBC AUTO: 82.8 FL (ref 80–99)
NRBC # BLD: 0 K/UL (ref 0–0.01)
NRBC BLD-RTO: 0 PER 100 WBC
PCO2 BLDA: 41 MMHG (ref 35–45)
PH BLDA: 7.43 (ref 7.35–7.45)
PLATELET # BLD AUTO: 197 K/UL (ref 150–400)
PMV BLD AUTO: 11.4 FL (ref 8.9–12.9)
PO2 BLDA: 83 MMHG (ref 80–100)
POTASSIUM SERPL-SCNC: 4.3 MMOL/L (ref 3.5–5.1)
RBC # BLD AUTO: 3.43 M/UL (ref 4.1–5.7)
SAO2 % BLD: 97 % (ref 92–97)
SAO2% DEVICE SAO2% SENSOR NAME: ABNORMAL
SERVICE CMNT-IMP: NORMAL
SODIUM SERPL-SCNC: 138 MMOL/L (ref 136–145)
SODIUM UR-SCNC: 75 MMOL/L
SODIUM UR-SCNC: 77 MMOL/L
SPECIMEN SITE: ABNORMAL
TRANSFERRIN SERPL-MCNC: 225 MG/DL (ref 149–313)
WBC # BLD AUTO: 20.9 K/UL (ref 4.1–11.1)

## 2025-04-17 PROCEDURE — P9047 ALBUMIN (HUMAN), 25%, 50ML: HCPCS | Performed by: INTERNAL MEDICINE

## 2025-04-17 PROCEDURE — 6370000000 HC RX 637 (ALT 250 FOR IP): Performed by: NURSE PRACTITIONER

## 2025-04-17 PROCEDURE — 6360000002 HC RX W HCPCS: Performed by: INTERNAL MEDICINE

## 2025-04-17 PROCEDURE — 2500000003 HC RX 250 WO HCPCS: Performed by: INTERNAL MEDICINE

## 2025-04-17 PROCEDURE — 51798 US URINE CAPACITY MEASURE: CPT

## 2025-04-17 PROCEDURE — 86038 ANTINUCLEAR ANTIBODIES: CPT

## 2025-04-17 PROCEDURE — 6370000000 HC RX 637 (ALT 250 FOR IP): Performed by: INTERNAL MEDICINE

## 2025-04-17 PROCEDURE — 84165 PROTEIN E-PHORESIS SERUM: CPT

## 2025-04-17 PROCEDURE — 86037 ANCA TITER EACH ANTIBODY: CPT

## 2025-04-17 PROCEDURE — 2060000000 HC ICU INTERMEDIATE R&B

## 2025-04-17 PROCEDURE — 36415 COLL VENOUS BLD VENIPUNCTURE: CPT

## 2025-04-17 PROCEDURE — 84155 ASSAY OF PROTEIN SERUM: CPT

## 2025-04-17 PROCEDURE — 82803 BLOOD GASES ANY COMBINATION: CPT

## 2025-04-17 PROCEDURE — 80048 BASIC METABOLIC PNL TOTAL CA: CPT

## 2025-04-17 PROCEDURE — 2580000003 HC RX 258: Performed by: INTERNAL MEDICINE

## 2025-04-17 PROCEDURE — 80074 ACUTE HEPATITIS PANEL: CPT

## 2025-04-17 PROCEDURE — 36600 WITHDRAWAL OF ARTERIAL BLOOD: CPT

## 2025-04-17 PROCEDURE — 84300 ASSAY OF URINE SODIUM: CPT

## 2025-04-17 PROCEDURE — 86160 COMPLEMENT ANTIGEN: CPT

## 2025-04-17 PROCEDURE — 82436 ASSAY OF URINE CHLORIDE: CPT

## 2025-04-17 PROCEDURE — 83735 ASSAY OF MAGNESIUM: CPT

## 2025-04-17 PROCEDURE — 86334 IMMUNOFIX E-PHORESIS SERUM: CPT

## 2025-04-17 PROCEDURE — 85652 RBC SED RATE AUTOMATED: CPT

## 2025-04-17 PROCEDURE — 94640 AIRWAY INHALATION TREATMENT: CPT

## 2025-04-17 PROCEDURE — 82784 ASSAY IGA/IGD/IGG/IGM EACH: CPT

## 2025-04-17 PROCEDURE — 94660 CPAP INITIATION&MGMT: CPT

## 2025-04-17 PROCEDURE — 83516 IMMUNOASSAY NONANTIBODY: CPT

## 2025-04-17 PROCEDURE — 93005 ELECTROCARDIOGRAM TRACING: CPT | Performed by: INTERNAL MEDICINE

## 2025-04-17 PROCEDURE — 85027 COMPLETE CBC AUTOMATED: CPT

## 2025-04-17 PROCEDURE — 94669 MECHANICAL CHEST WALL OSCILL: CPT

## 2025-04-17 RX ORDER — FUROSEMIDE 10 MG/ML
40 INJECTION INTRAMUSCULAR; INTRAVENOUS ONCE
Status: COMPLETED | OUTPATIENT
Start: 2025-04-17 | End: 2025-04-17

## 2025-04-17 RX ORDER — ALBUMIN (HUMAN) 12.5 G/50ML
25 SOLUTION INTRAVENOUS EVERY 8 HOURS
Status: COMPLETED | OUTPATIENT
Start: 2025-04-17 | End: 2025-04-17

## 2025-04-17 RX ADMIN — IPRATROPIUM BROMIDE AND ALBUTEROL SULFATE 1 DOSE: 2.5; .5 SOLUTION RESPIRATORY (INHALATION) at 19:39

## 2025-04-17 RX ADMIN — ALBUMIN (HUMAN) 25 G: 0.25 INJECTION, SOLUTION INTRAVENOUS at 14:20

## 2025-04-17 RX ADMIN — IPRATROPIUM BROMIDE AND ALBUTEROL SULFATE 1 DOSE: 2.5; .5 SOLUTION RESPIRATORY (INHALATION) at 11:31

## 2025-04-17 RX ADMIN — IRON SUCROSE 200 MG: 20 INJECTION, SOLUTION INTRAVENOUS at 18:13

## 2025-04-17 RX ADMIN — WATER 60 MG: 1 INJECTION INTRAMUSCULAR; INTRAVENOUS; SUBCUTANEOUS at 21:37

## 2025-04-17 RX ADMIN — PIPERACILLIN AND TAZOBACTAM 3375 MG: 3; .375 INJECTION, POWDER, LYOPHILIZED, FOR SOLUTION INTRAVENOUS at 11:10

## 2025-04-17 RX ADMIN — FINASTERIDE 5 MG: 5 TABLET, FILM COATED ORAL at 09:30

## 2025-04-17 RX ADMIN — BUDESONIDE 500 MCG: 0.5 INHALANT RESPIRATORY (INHALATION) at 19:40

## 2025-04-17 RX ADMIN — BUDESONIDE 500 MCG: 0.5 INHALANT RESPIRATORY (INHALATION) at 08:00

## 2025-04-17 RX ADMIN — WATER 60 MG: 1 INJECTION INTRAMUSCULAR; INTRAVENOUS; SUBCUTANEOUS at 04:49

## 2025-04-17 RX ADMIN — SODIUM CHLORIDE, PRESERVATIVE FREE 10 ML: 5 INJECTION INTRAVENOUS at 09:48

## 2025-04-17 RX ADMIN — CARBIDOPA AND LEVODOPA 1 TABLET: 25; 100 TABLET, EXTENDED RELEASE ORAL at 21:37

## 2025-04-17 RX ADMIN — SERTRALINE 50 MG: 50 TABLET, FILM COATED ORAL at 09:29

## 2025-04-17 RX ADMIN — CARBIDOPA AND LEVODOPA 1 TABLET: 25; 100 TABLET, EXTENDED RELEASE ORAL at 09:30

## 2025-04-17 RX ADMIN — WATER 60 MG: 1 INJECTION INTRAMUSCULAR; INTRAVENOUS; SUBCUTANEOUS at 18:14

## 2025-04-17 RX ADMIN — IPRATROPIUM BROMIDE AND ALBUTEROL SULFATE 1 DOSE: 2.5; .5 SOLUTION RESPIRATORY (INHALATION) at 08:00

## 2025-04-17 RX ADMIN — ASPIRIN 81 MG: 81 TABLET, COATED ORAL at 21:36

## 2025-04-17 RX ADMIN — PANTOPRAZOLE SODIUM 40 MG: 40 TABLET, DELAYED RELEASE ORAL at 05:28

## 2025-04-17 RX ADMIN — SODIUM CHLORIDE, PRESERVATIVE FREE 10 ML: 5 INJECTION INTRAVENOUS at 21:38

## 2025-04-17 RX ADMIN — WATER 60 MG: 1 INJECTION INTRAMUSCULAR; INTRAVENOUS; SUBCUTANEOUS at 09:30

## 2025-04-17 RX ADMIN — ENOXAPARIN SODIUM 30 MG: 100 INJECTION SUBCUTANEOUS at 09:47

## 2025-04-17 RX ADMIN — PIPERACILLIN AND TAZOBACTAM 3375 MG: 3; .375 INJECTION, POWDER, LYOPHILIZED, FOR SOLUTION INTRAVENOUS at 21:25

## 2025-04-17 RX ADMIN — FUROSEMIDE 40 MG: 10 INJECTION, SOLUTION INTRAMUSCULAR; INTRAVENOUS at 14:17

## 2025-04-17 RX ADMIN — ATORVASTATIN CALCIUM 20 MG: 20 TABLET, FILM COATED ORAL at 21:36

## 2025-04-17 RX ADMIN — TROSPIUM CHLORIDE 20 MG: 20 TABLET, FILM COATED ORAL at 21:37

## 2025-04-17 RX ADMIN — DONEPEZIL HYDROCHLORIDE 10 MG: 5 TABLET, FILM COATED ORAL at 21:36

## 2025-04-17 RX ADMIN — IPRATROPIUM BROMIDE AND ALBUTEROL SULFATE 1 DOSE: 2.5; .5 SOLUTION RESPIRATORY (INHALATION) at 15:22

## 2025-04-17 RX ADMIN — WATER 60 MG: 1 INJECTION INTRAMUSCULAR; INTRAVENOUS; SUBCUTANEOUS at 00:48

## 2025-04-17 RX ADMIN — AZITHROMYCIN 500 MG: 250 TABLET, FILM COATED ORAL at 09:29

## 2025-04-17 RX ADMIN — ALBUMIN (HUMAN) 25 G: 0.25 INJECTION, SOLUTION INTRAVENOUS at 09:47

## 2025-04-17 RX ADMIN — ALBUMIN (HUMAN) 25 G: 0.25 INJECTION, SOLUTION INTRAVENOUS at 00:54

## 2025-04-17 RX ADMIN — TAMSULOSIN HYDROCHLORIDE 0.4 MG: 0.4 CAPSULE ORAL at 09:29

## 2025-04-17 RX ADMIN — MELATONIN 6 MG: at 21:36

## 2025-04-17 RX ADMIN — MEMANTINE 5 MG: 10 TABLET ORAL at 09:29

## 2025-04-17 RX ADMIN — MEMANTINE 5 MG: 10 TABLET ORAL at 21:37

## 2025-04-17 RX ADMIN — POTASSIUM CHLORIDE 40 MEQ: 1500 TABLET, EXTENDED RELEASE ORAL at 09:30

## 2025-04-17 ASSESSMENT — PAIN SCALES - GENERAL
PAINLEVEL_OUTOF10: 0

## 2025-04-17 NOTE — PROGRESS NOTES
Nephrology Progress Note  [facility] [place] [facility]  [address], [address]  [place] [address]  Phone - [phone]  Fax - [phone]                   Patient: Jonatan Rausch Jr.                     YOB: 1945        Date- 4/17/2025                                     Admit Date: 4/7/2025   CC: Follow up for aguilar on ckd          IMPRESSION & PLAN:   Aguilar due to due to multiple etiology-urinary retention, cardiorenal syndrome versus ATN versus diuretic use  Hypoxic respiratory failure  CHF  Hypertension  CKD 2- BL CR 1.1  History of CAD, CABG  History of Parkinson's disease      PLAN-  Check bladder scan again today-- if PVR high- place verma cath  Give iv lasix today with albumin  Follow bmp  Check urine cl, na     Subjective:  Interval History:   -  cr improved, urine out put > 2200 ml. He required straight cath    Objective:   Vitals:    04/17/25 0752 04/17/25 0754 04/17/25 0801 04/17/25 0924   BP: 113/67      Pulse: (!) 105 (!) 103 99 100   Resp: (!) 33 (!) 33 (!) 32 (!) 33   Temp: 98.9 °F (37.2 °C)      TempSrc: Oral      SpO2: (!) 79% (!) 89% 90% (!) 89%   Weight:       Height:          I/O last 3 completed shifts:  In: 929.8 [P.O.:880; IV Piggyback:49.8]  Out: 2483 [Urine:2483]  No intake/output data recorded.      Physical exam:    GEN: NAD  NECK- no mass  RESP: No wheezing,coarse BS b/l  CVS: S1,S2  RRR  NEURO: Normal speech, Non focal  EXT: + Edema   PSYCH: Normal Mood    Chart reviewed.         Pertinent Notes reviewed.       Data Review :  Lab Results   Component Value Date/Time     04/17/2025 05:03 AM    K 4.3 04/17/2025 05:03 AM     04/17/2025 05:03 AM    CO2 26 04/17/2025 05:03 AM    BUN 78 04/17/2025 05:03 AM    CREATININE 2.31 04/17/2025 05:03 AM    GLUCOSE 149 04/17/2025 05:03 AM    CALCIUM 9.1 04/17/2025 05:03 AM       Lab Results   Component Value Date    WBC 11.3 (H) 04/16/2025    HGB 10.4 (L)

## 2025-04-17 NOTE — PROGRESS NOTES
Physician Progress Note      PATIENT:               DWIGHT HOLLAND  CSN #:                  [id number]  :                       1945  ADMIT DATE:       2025 12:35 PM  DISCH DATE:  RESPONDING  PROVIDER #:        Kris Nino MD          QUERY TEXT:    Pneumonia and acute respiratory failure are documented in the medical record    IM PN (JUAN Nino).  Please clarify any associated diagnoses:    The clinical indicators include:  -  IM (JESUS Zamudio) notes SIRS POA \"likely secondary to multifocal pneumonia\"  -  H&P (SATYA Domínguez) notes: SIRS POA WBC 14.5, , RR 33  - WBC 14.5 on  per lab results with range 12.3- 14.5 during admission  - blood cxs with no growth per lab results  - Zithromax IV, Rocephin IV, Zosyn IV per MAR  Options provided:  -- Severe sepsis, POA  -- Sepsis, POA  -- Localized infection without sepsis  -- Other - I will add my own diagnosis  -- Disagree - Not applicable / Not valid  -- Disagree - Clinically unable to determine / Unknown  -- Refer to Clinical Documentation Reviewer    PROVIDER RESPONSE TEXT:    This patient has severe sepsis with acute respiratory failure which was   present on admission.    Query created by: Milly Garay on 2025 3:20 PM      Electronically signed by:  Kris Nino MD 2025 10:04 PM

## 2025-04-17 NOTE — H&P
[facility]        UROLOGY CONSULT NOTE     Patient: Jonatan Rausch Jr. MRN: [id number]  PCP: Kenny Barksdale MD   :     1945  Age:   80 y.o.  Sex:  male      Requesting Provider: Rodrigue Nichols MD  Reason for consultation: 80 y.o. male with Sepsis (HCC) [A41.9]  Community acquired pneumonia, unspecified laterality [J18.9]    Admission Date: 2025 12:35 PM  LOS: 10 days     Code Status: Full Code   PCP: Kenyn Barksdale MD  - [phone]   Emergency Contact:  Primary Emergency Contact: shree rausch     Assessment:   Gross hematuria suspect multifactorial catheter trauma/ BPH/ ASA and Lovenox  Acute Urinary retention  BPH with LUTS- followed by Dr. Auguste   - prostate vol measures 87cc.  PABLO  CHF  Anemia    Recommendations:     Would hold ASA and Lovenox at this time.  Hold Trospium with ongoing retention  Irrigate verma q 4 hrs and PRN for suprapubic discomfort.  If unable to irrigate catheter or increase clots patient will need verma removed and up sized to to 20 Fr.  Trend daily labs, check UA  Continue with Flomax/ Proscar  Patient will need to keep verma in place for minimum of 7 days.    Thank you for this consult.   Reviewed with supervising MD: Dr. Slade  History of Present Illness:     Jonaatn Rausch Jr. is a 80 y.o.   male is seen in consultation for hematuria with urinary retention at the request of Rodrigue Nichols MD. This patient presented to ER on  with worsening SOB- he underwent diuresis with cardiology- later developed increase PABLO along with urinary retention s/p straight cath x 2 with verma placement earlier today.  The patient was noted to have bladder scans 800-900 ml for the past 2 days.  His PMH includes Parkinson's, dysphagia, HTN, BPH, OAB, CAD s/p stents and dementia.  Labs from today: Cr 2.3 (prior 2.8), WBC 11.3 (prior 14.4), Hgb 10.4- there is no UA. There is no abdominal imaging for review.  The patient is seen and

## 2025-04-17 NOTE — PROGRESS NOTES
Pulmonary Progress Note    Patient: Jonatan Rausch Jr.                     YOB: 1945        Date- 4/17/2025                           Admit Date: 4/7/2025       CC: Follow up for hypoxia, pulmonary edema and pleural effusions.        IMPRESSION & PLAN:     Acute hypoxic respiratory failure  Pulmonary edema and possible CAP  Bilateral pleural effusions  Sleep apnea, on CPAP  PABLO  CHF    Plan    Acute hypoxic respiratory failure, on HFNC last night, currently on BiPAP on FiO2 70%, ABG ordered.  pH 7.43, pCO2 41, pO2 83 sats 97% on BiPAP.  Will try switching the patient to high flow nasal cannula.   Continue close monitoring, may need ICU if respiratory status declines.  Improving pulmonary infiltrates, especially in the right upper lung and traces of effusions on chest x-ray 4/16.  Suspect inflammatory/infectious infiltrates.  Follow-up chest x-ray in the morning  Continue IV steroids.  Elevated CRP , Respiratory viral panel, pneumonia antigens, blood cultures and procalcitonin negative.  Continue empirical antibiotics.  Increase pulm toilet  DuoNeb nebulizations.  Cardiology notes reviewed    Had a long discussion with wife about the events yesterday and the current plan.    Case discussed with hospitalist.      Medical Decision Making  Problems Addressed:  Acute respiratory failure with hypoxia: acute illness or injury  Community acquired pneumonia, unspecified laterality: acute illness or injury  Congestive heart failure, unspecified HF chronicity, unspecified heart failure type (HCC): acute illness or injury  Pleural effusion: acute illness or injury    Amount and/or Complexity of Data Reviewed  External Data Reviewed: labs and notes.     Details: Multiple labs, urine pneumonia antigens, cardiology notes  Radiology: ordered.     Details: Chest x-ray  Discussion of management or test interpretation with external provider(s): Hospitalist    Risk  Prescription drug management.  Risk Details:

## 2025-04-17 NOTE — PROGRESS NOTES
[facility]  Continues with hematuria cherry red clear- small clot noted in distal tubing.  Manually irrigated catheter- cleared to pale pink lemonade in color no clots appreciated.  Discussed with primary RN if worsening hematuria or clots then exchange catheter to 3 way 20 fr and can start CBI.  Continue with manual irrigation q 4hrs and PRN clots for now.

## 2025-04-17 NOTE — PROGRESS NOTES
Hospitalist Progress Note    NAME:   Jonatan Rausch Jr.   : 1945   MRN: [id number]     Date/Time: 2025 1:55 PM  Patient PCP: Kenny Barksdale MD    Estimated discharge date:  Barriers: Improvement in respiratory status      Assessment / Plan:  Acute hypoxic respiratory failure worsening today  Acute on chronic HFrEF vs Pneumonia  - Initial CT showed multifocal pneumonia BL effusions, with low procalcitonin level and leukocytosis  - BNP of 13,000, echocardiogram showed EF of 23 percent  - Started on Lasix 40 mg twice daily on 4/10 , switch Rocephin to Zosyn  on , Lasix has been may held  due to rising creatinine  -Echo preliminary report of EF 15-20%  -Respiratory panel negative,   - metolazone 5 mg daily on , discontinue metolazone on  due to PABLO  - Repeat CT on   showed: Decreased bilateral pleural effusions with increased parenchymal groundglass throughout the lungs bilaterally right greater than left, suggestive of edema, less likely diffuse alveolar infection.  -Consulted cardiology on   -IV lasix held due to bump Cr  -Continue IV zosyn and zithromax  -Continue IV solumedrol q6  -Check MRSA screen - pending  -HFNC, NIV prn  -pulmonary following, appreciate help    PABLO  Urine retention  Hematuria due to catheter trauma  -overdiuresed vs ATN vs Cardiorenal  -CR 1.0-->1.4-->2.4-->2.8  -holding lasix  -straight cath x 2 --> verma inserted   -Nephrology input appreciated  -Urology consulted.  [place] verma - clearing     Intermittent dysphagia  - Consulted with GI  -Barium swallow 24  -There is no evidence of esophageal stricture or significant esophageal dysmotility.  - Given patient suffering from pneumonia and dysphagia is intermittent without pain, recommend outpatient follow-up  -- start with SLP eval     Iron deficiency anemia  -iron 12, TIBC 294, 4%iron sat  -IV iron sucrose 200mg daily x 3  -GI eval pending medical stabillity    History of Parkinson's

## 2025-04-17 NOTE — PROGRESS NOTES
[facility]     Progress Note      4/17/2025 8:48 AM  NAME: Jonatan Rausch Jr.   MRN:  [id number]   Admit Diagnosis: Sepsis (HCC) [A41.9]  Community acquired pneumonia, unspecified laterality [J18.9]     Problem List:     --Acute on chronic congestive heart failure exacerbation.   Acute hypoxic respiratory failure  In the setting of CHF exacerbation and possible aspiration pneumonia   Getting treated with empiric antibiotics     Bilateral pleural effusions  Acute on chronic renal failure  Anemia   CAD status post CABG  Dysphagia  Parkinsons      Assessment/Plan:     --Continue diuresis  Will need to add HF meds. In the setting of ARF, we will start with jardiance and metoprolol. ACE/ARB or aldactone after/if renal function improves  Will need outpatient ischemic evaluation  Pls order iron dextrose and start on iron supplementation. GI to evaluate for possible GIB   Strict IO  Daily weights  4/17/2025 update: Continues to be acutely ill. -1353/24 hours.Frequent PVCs. K 4.3, Mg 2.3.  Would check ABG for acidosis.Discussed with RN.  Renal function remains above threshold for GDMT and BP on lower side. Unable to have BB or SGTL2 on board. No further cardiac recs at this time.        [x]       High complexity decision making was performed in this patient at high risk for decompensation with multiple organ involvement.    We discussed the expected course, resolution and complications of the diagnoses in detail.  Medication risk, benefits, costs, interactions, and alternatives were discussed as indicated.  I advised him to contact the office if his condition worsens, changes or fails to improve as anticipated.  Patient expressed understanding with the diagnoses  and plan.    Subjective:     Jonatan Rausch Jr. Is on Bipap and somnolent. Discussed with RN  Discussed with RN events overnight.     Review of Systems:    Symptom Y/N Comments  Symptom Y/N Comments   Fever/Chills N   Chest Pain N    Poor

## 2025-04-17 NOTE — PROGRESS NOTES
Bedside shift change report given to BAR Bah (oncoming nurse) by BAR Green (offgoing nurse). Report included the following information Nurse Handoff Report, Index, ED SBAR, Intake/Output, MAR, Recent Results, and Cardiac Rhythm NSR .     End of Shift Note    Bedside shift change report given to BRA Bright and Sherie(oncoming nurse) by Niurka Buchanan RN (offgoing nurse).  Report included the following information SBAR, Kardex, Intake/Output, MAR, Recent Results, and Cardiac Rhythm NSR    Shift worked:  7p-7a     Shift summary and any significant changes:     Patient tolerated care. Scheduled medication given. Caring rounds provided. Q2 turns. Bladder scanned. Lab work drawn, new IV placed.  C PAP worn over shift at 70%.     Concerns for physician to address:  Bladder scanned, Pt retaining fluid  drained when straight cathed x2. See flow sheet for details.     Zone phone for oncoming shift:   [phone]       Activity:  Level of Assistance: Moderate assist, patient does 50-74%  Number times ambulated in hallways past shift: 0  Number of times OOB to chair past shift: 0    Cardiac:   Cardiac Monitoring: Yes      Cardiac Rhythm: Sinus tachy    Access:  Current line(s): PIV     Genitourinary:   Urinary Status: Voiding, External catheter    Respiratory:   O2 Device: PAP (positive airway pressure)  Chronic home O2 use?: NO  Incentive spirometer at bedside: YES    GI:  Last BM (including prior to admit): 04/16/25  Current diet:  ADULT DIET; Regular; GI [place] (GERD/Peptic Ulcer)  ADULT ORAL NUTRITION SUPPLEMENT; Breakfast, Lunch, Dinner; Standard High Calorie/High Protein Oral Supplement  Passing flatus: YES    Pain Management:   Patient states pain is manageable on current regimen: YES    Skin:  Krishna Scale Score: 14  Interventions: Wound Offloading (Prevention Methods): Pillows, Repositioning    Patient Safety:  Fall Risk: Nursing Judgement-Fall Risk High(Add Comments): Yes  Fall Risk Interventions  Nursing Judgement-Fall

## 2025-04-17 NOTE — CARE COORDINATION
Transition of Care Plan:    RUR: 14% medium Risk  Prior Level of Functioning: Independent in ADLs/IADLs  Disposition: home with [facility] ([facility]) Phone: [phone] soc within 48 hours  FAM: 4/21  If SNF or IPR: Date FOC offered: na  Date FOC received:   Accepting facility:   Date authorization started with reference number:   Date authorization received and expires:   Follow up appointments: pcp/specialist   DME needed: none  Transportation at discharge: the patient's family  IM/IMM Medicare/ letter given: 2nd IM upon discharge  Is patient a  and connected with [facility]? na   If yes, was [place] transfer form completed and VA notified? na  Caregiver Contact:  shree luke (Spouse) [phone]  Discharge Caregiver contacted prior to discharge? The patient to contact  Care Conference needed? no  Barriers to discharge: medical clearance      PT/OT recommendation calls for Intermittent occupational therapy up to 2-3x/week in previous living setting with additional support needed for safety w/ ADL progression. Due to the patient's challenging geographic location, a mass referral to be made to secure a home health agency. The patient has been arranged with [facility] (formerly [facility]) Phone: [phone] soc within 48 hours.     Pt resides with spouse in a one level home with 8 BEHZAD. Pt states he is independent with ADL's and is an active .        Tony Luke, RN  Case Management  [phone]

## 2025-04-17 NOTE — PROGRESS NOTES
Bedside shift change report given to Jorge RN (oncoming nurse) by Zakiya RN (offgoing nurse). Report included the following information Nurse Handoff Report.

## 2025-04-17 NOTE — PROGRESS NOTES
Bedside shift change report given to BAR Vick and BAR Patel (oncoming nurse) by BAR Sigala and BAR Beard (offgoing nurse). Report included the following information Nurse Handoff Report and Cardiac Rhythm NSR with PVCs .      0748--BAR Patel will be charting on this patient.     1230--Verma placed per order from Dr. Biswas after bladder scan showing 839 mL. Urine very bloody.    1237--Dr. Biswas (with nephrology) made aware about bloody urine. Per MD, consult urology.     1332--Urology came and irrigated verma.     1615--Urology NP came and irrigated verma again. Urine changed to light pink color.     1900--Verma irrigated again for bloody drainage and a few small clots in tubing. Urine color went from a dark red to a light pink color.    2050--This RN agrees with ABR Patel's charting.     End of Shift Note    Bedside shift change report given to BAR Hair (oncoming nurse) by Nava Quiroz RN and BAR Patel (offgoing nurse).  Report included the following information SBAR and Cardiac Rhythm Sinus tachycardia/Ventricular Trigeminy    Shift worked:  Day shift     Shift summary and any significant changes:    Cardiology, nephrology, urology, and pulmonology came to see patient today. Per nephrology, verma catheter was placed.   Patient having increasing oxygen requirements and will desat to mid 80's when on heated high flow NC. On continuous BiPAP for most of the day.   Patient in ventricular trigeminy today. ABG and EKG done per cardiology.   Albumin and Lasix given per nephrology.   Labs sent per MD. Wound care nurse came to see patient.   Concerns for physician to address:  --drop in Hgb   --elevated WBC count  --increasing oxygen requirements   Zone phone for oncoming shift:   [phone]       Activity:  Level of Assistance: Moderate assist, patient does 50-74%  Number times ambulated in hallways past shift: 0  Number of times OOB to chair past shift: 0    Cardiac:   Cardiac Monitoring: Yes      Cardiac Rhythm: Sinus

## 2025-04-17 NOTE — WOUND CARE
Wound care nurse consult for perineal skin tag.    79 y/o male admitted for sepsis.  Patient on PCU  Acute hypoxic respiratory failure  Pulmonary edema and possible CAP    Past Medical History:   Diagnosis Date    CAD (coronary artery disease)     Hypertension     Sleep apnea      Past Surgical History:   Procedure Laterality Date    ILIO-FEMORAL BYPASS GRAFT      over 12 yrs ago - not sure of date    TOTAL KNEE ARTHROPLASTY Bilateral      Patient has a large perineal skin tag that has become irritated by friction and moisture: partial thickness wound      Recommend and being done by staff:    Perineal skin tag injury from moisture and friction: PRN, gently clean with foamy soap and water, pat dry and apply zinc cream as dressing to wound and surrounding skin.    JACKIE SYKES RN, CWON

## 2025-04-17 NOTE — PLAN OF CARE
Predictive Model Details          72 (Warning)  Factor Value    Calculated 4/17/2025 07:57 30% Respiratory rate 33    Deterioration Index Model 18% Age 80 years old     15% Supplemental oxygen PAP (positive airway pressure)     13% Neurological exam X     6% Pulse oximetry 89 %     5% Cardiac rhythm Sinus tachy     4% Pulse 103     3% WBC count abnormal (11.3 K/uL)     2% Blood pH abnormal (7.50  )     2% Potassium 4.3 mmol/L     1% BUN abnormal (78 MG/DL)     1% Sodium 138 mmol/L     1% Hematocrit abnormal (32.9 %)     0% Temperature 98.9 °F (37.2 °C)     0% Systolic 113       Problem: Discharge Planning  Goal: Discharge to home or other facility with appropriate resources  Outcome: Progressing     Problem: Skin/Tissue Integrity  Goal: Skin integrity remains intact  Description: 1.  Monitor for areas of redness and/or skin breakdown  2.  Assess vascular access sites hourly  3.  Every 4-6 hours minimum:  Change oxygen saturation probe site  4.  Every 4-6 hours:  If on nasal continuous positive airway pressure, respiratory therapy assess nares and determine need for appliance change or resting period  Outcome: Progressing  Flowsheets (Taken 4/16/2025 1945 by Niurka Buchanan, RN)  Skin Integrity Remains Intact: Monitor for areas of redness and/or skin breakdown     Problem: Safety - Adult  Goal: Free from fall injury  Outcome: Progressing     Problem: Neurosensory - Adult  Goal: Achieves stable or improved neurological status  Outcome: Progressing  Flowsheets (Taken 4/16/2025 1945 by Niurka Buchanan, RN)  Achieves stable or improved neurological status: Assess for and report changes in neurological status  Goal: Achieves maximal functionality and self care  Outcome: Progressing     Problem: Respiratory - Adult  Goal: Achieves optimal ventilation and oxygenation  4/17/2025 0756 by Jorge Serrato, RN  Outcome: Progressing  4/16/2025 2228 by Pawan Moe RCP  Outcome: Progressing  Flowsheets (Taken 4/16/2025 1945 by

## 2025-04-17 NOTE — PROGRESS NOTES
PT  Note:    Chart reviewed in preparation for PT treatment, spoke with RN who states patient not cleared due to rapid  02 desaturation. Pt currently on  heated hi flow 02 @ 55 L and CPAP. Will continue to follow and see as appropriate.    Amy Nicole, PT

## 2025-04-17 NOTE — PROGRESS NOTES
Occupational Therapy    Chart reviewed in preparation for OT treatment, spoke with PT who relates per nurse patient not cleared due to rapid 02 desaturation. Pt currently on heated hi flow 02 @ 55 L and CPAP. Will continue to follow and see as appropriate.     Martha Espino, OTR/L

## 2025-04-17 NOTE — PROGRESS NOTES
Physician Progress Note      PATIENT:               DWIGHT HOLLAND  CSN #:                  [id number]  :                       1945  ADMIT DATE:       2025 12:35 PM  DISCH DATE:  RESPONDING  PROVIDER #:        Rodrigue Nichols MD          QUERY TEXT:    Please clarify the patient?s nutritional status:    The clinical indicators include:  -  notes: Malnutrition Status:  Moderate malnutrition (04/15/25 1548)  Context:  Acute Illness  Findings of the 6 clinical characteristics of malnutrition:  Energy Intake:  50% or less of estimated energy requirements for 5 or more   days  Body Fat Loss:  Mild body fat loss Orbital  Muscle Mass Loss:  Mild muscle mass loss Clavicles (pectoralis & deltoids),   Temples (temporalis)  - ONS with breakfast, lunch, and dinner per order review  Options provided:  -- Protein calorie malnutrition moderate  -- Other - I will add my own diagnosis  -- Disagree - Not applicable / Not valid  -- Disagree - Clinically unable to determine / Unknown  -- Refer to Clinical Documentation Reviewer    PROVIDER RESPONSE TEXT:    This patient has moderate protein calorie malnutrition.    Query created by: Milly Garay on 2025 2:16 PM      Electronically signed by:  Rodrigue Nichols MD 2025 1:55 PM

## 2025-04-18 ENCOUNTER — APPOINTMENT (OUTPATIENT)
Facility: HOSPITAL | Age: 80
DRG: 871 | End: 2025-04-18
Payer: MEDICARE

## 2025-04-18 PROBLEM — Z51.5 PALLIATIVE CARE BY SPECIALIST: Status: ACTIVE | Noted: 2025-04-18

## 2025-04-18 PROBLEM — J96.01 ACUTE RESPIRATORY FAILURE WITH HYPOXIA (HCC): Status: ACTIVE | Noted: 2025-04-18

## 2025-04-18 LAB
ANION GAP SERPL CALC-SCNC: 5 MMOL/L (ref 2–12)
BASOPHILS # BLD: 0.02 K/UL (ref 0–0.1)
BASOPHILS NFR BLD: 0.1 % (ref 0–1)
BUN SERPL-MCNC: 78 MG/DL (ref 6–20)
BUN/CREAT SERPL: 44 (ref 12–20)
CALCIUM SERPL-MCNC: 9.3 MG/DL (ref 8.5–10.1)
CHLORIDE SERPL-SCNC: 106 MMOL/L (ref 97–108)
CO2 SERPL-SCNC: 32 MMOL/L (ref 21–32)
CREAT SERPL-MCNC: 1.78 MG/DL (ref 0.7–1.3)
DIFFERENTIAL METHOD BLD: ABNORMAL
EOSINOPHIL # BLD: 0 K/UL (ref 0–0.4)
EOSINOPHIL NFR BLD: 0 % (ref 0–7)
ERYTHROCYTE [DISTWIDTH] IN BLOOD BY AUTOMATED COUNT: 16.8 % (ref 11.5–14.5)
GBM IGG SER-ACNC: <0.2 UNITS (ref 0–0.9)
GLUCOSE SERPL-MCNC: 130 MG/DL (ref 65–100)
HCT VFR BLD AUTO: 28.2 % (ref 36.6–50.3)
HGB BLD-MCNC: 8.6 G/DL (ref 12.1–17)
IMM GRANULOCYTES # BLD AUTO: 0.09 K/UL (ref 0–0.04)
IMM GRANULOCYTES NFR BLD AUTO: 0.5 % (ref 0–0.5)
LYMPHOCYTES # BLD: 0.31 K/UL (ref 0.8–3.5)
LYMPHOCYTES NFR BLD: 1.8 % (ref 12–49)
MCH RBC QN AUTO: 25.7 PG (ref 26–34)
MCHC RBC AUTO-ENTMCNC: 30.5 G/DL (ref 30–36.5)
MCV RBC AUTO: 84.4 FL (ref 80–99)
MONOCYTES # BLD: 0.5 K/UL (ref 0–1)
MONOCYTES NFR BLD: 2.9 % (ref 5–13)
NEUTS SEG # BLD: 16.18 K/UL (ref 1.8–8)
NEUTS SEG NFR BLD: 94.7 % (ref 32–75)
NRBC # BLD: 0 K/UL (ref 0–0.01)
NRBC BLD-RTO: 0 PER 100 WBC
PLATELET # BLD AUTO: 184 K/UL (ref 150–400)
PMV BLD AUTO: 11.3 FL (ref 8.9–12.9)
POTASSIUM SERPL-SCNC: 3.4 MMOL/L (ref 3.5–5.1)
RBC # BLD AUTO: 3.34 M/UL (ref 4.1–5.7)
RBC MORPH BLD: ABNORMAL
RBC MORPH BLD: ABNORMAL
SODIUM SERPL-SCNC: 143 MMOL/L (ref 136–145)
WBC # BLD AUTO: 17.1 K/UL (ref 4.1–11.1)

## 2025-04-18 PROCEDURE — 6360000002 HC RX W HCPCS: Performed by: INTERNAL MEDICINE

## 2025-04-18 PROCEDURE — 94660 CPAP INITIATION&MGMT: CPT

## 2025-04-18 PROCEDURE — 85025 COMPLETE CBC W/AUTO DIFF WBC: CPT

## 2025-04-18 PROCEDURE — 2700000000 HC OXYGEN THERAPY PER DAY

## 2025-04-18 PROCEDURE — 71045 X-RAY EXAM CHEST 1 VIEW: CPT

## 2025-04-18 PROCEDURE — 2500000003 HC RX 250 WO HCPCS: Performed by: INTERNAL MEDICINE

## 2025-04-18 PROCEDURE — 80048 BASIC METABOLIC PNL TOTAL CA: CPT

## 2025-04-18 PROCEDURE — 99221 1ST HOSP IP/OBS SF/LOW 40: CPT | Performed by: FAMILY MEDICINE

## 2025-04-18 PROCEDURE — 92610 EVALUATE SWALLOWING FUNCTION: CPT

## 2025-04-18 PROCEDURE — 6370000000 HC RX 637 (ALT 250 FOR IP): Performed by: INTERNAL MEDICINE

## 2025-04-18 PROCEDURE — 2060000000 HC ICU INTERMEDIATE R&B

## 2025-04-18 PROCEDURE — 94640 AIRWAY INHALATION TREATMENT: CPT

## 2025-04-18 PROCEDURE — 2580000003 HC RX 258: Performed by: INTERNAL MEDICINE

## 2025-04-18 PROCEDURE — 6370000000 HC RX 637 (ALT 250 FOR IP): Performed by: NURSE PRACTITIONER

## 2025-04-18 PROCEDURE — 51702 INSERT TEMP BLADDER CATH: CPT

## 2025-04-18 PROCEDURE — 6360000002 HC RX W HCPCS: Performed by: NURSE PRACTITIONER

## 2025-04-18 PROCEDURE — 36415 COLL VENOUS BLD VENIPUNCTURE: CPT

## 2025-04-18 RX ORDER — LIDOCAINE HYDROCHLORIDE 20 MG/ML
JELLY TOPICAL
Status: COMPLETED | OUTPATIENT
Start: 2025-04-18 | End: 2025-04-18

## 2025-04-18 RX ADMIN — CARBIDOPA AND LEVODOPA 1 TABLET: 25; 100 TABLET, EXTENDED RELEASE ORAL at 11:57

## 2025-04-18 RX ADMIN — WATER 60 MG: 1 INJECTION INTRAMUSCULAR; INTRAVENOUS; SUBCUTANEOUS at 11:27

## 2025-04-18 RX ADMIN — QUETIAPINE FUMARATE 25 MG: 25 TABLET ORAL at 18:31

## 2025-04-18 RX ADMIN — HYDROMORPHONE HYDROCHLORIDE 0.5 MG: 1 INJECTION, SOLUTION INTRAMUSCULAR; INTRAVENOUS; SUBCUTANEOUS at 11:35

## 2025-04-18 RX ADMIN — FINASTERIDE 5 MG: 5 TABLET, FILM COATED ORAL at 11:56

## 2025-04-18 RX ADMIN — LIDOCAINE HYDROCHLORIDE: 20 JELLY TOPICAL at 11:36

## 2025-04-18 RX ADMIN — DONEPEZIL HYDROCHLORIDE 10 MG: 5 TABLET, FILM COATED ORAL at 20:49

## 2025-04-18 RX ADMIN — AZITHROMYCIN 500 MG: 250 TABLET, FILM COATED ORAL at 11:57

## 2025-04-18 RX ADMIN — IPRATROPIUM BROMIDE AND ALBUTEROL SULFATE 1 DOSE: 2.5; .5 SOLUTION RESPIRATORY (INHALATION) at 20:30

## 2025-04-18 RX ADMIN — IRON SUCROSE 200 MG: 20 INJECTION, SOLUTION INTRAVENOUS at 18:36

## 2025-04-18 RX ADMIN — WATER 60 MG: 1 INJECTION INTRAMUSCULAR; INTRAVENOUS; SUBCUTANEOUS at 23:56

## 2025-04-18 RX ADMIN — IPRATROPIUM BROMIDE AND ALBUTEROL SULFATE 1 DOSE: 2.5; .5 SOLUTION RESPIRATORY (INHALATION) at 11:32

## 2025-04-18 RX ADMIN — PIPERACILLIN AND TAZOBACTAM 3375 MG: 3; .375 INJECTION, POWDER, LYOPHILIZED, FOR SOLUTION INTRAVENOUS at 13:46

## 2025-04-18 RX ADMIN — IPRATROPIUM BROMIDE AND ALBUTEROL SULFATE 1 DOSE: 2.5; .5 SOLUTION RESPIRATORY (INHALATION) at 08:44

## 2025-04-18 RX ADMIN — TAMSULOSIN HYDROCHLORIDE 0.4 MG: 0.4 CAPSULE ORAL at 11:57

## 2025-04-18 RX ADMIN — PANTOPRAZOLE SODIUM 40 MG: 40 TABLET, DELAYED RELEASE ORAL at 06:02

## 2025-04-18 RX ADMIN — BUDESONIDE 500 MCG: 0.5 INHALANT RESPIRATORY (INHALATION) at 08:44

## 2025-04-18 RX ADMIN — MELATONIN 6 MG: at 20:49

## 2025-04-18 RX ADMIN — TROSPIUM CHLORIDE 20 MG: 20 TABLET, FILM COATED ORAL at 20:49

## 2025-04-18 RX ADMIN — WATER 60 MG: 1 INJECTION INTRAMUSCULAR; INTRAVENOUS; SUBCUTANEOUS at 05:11

## 2025-04-18 RX ADMIN — BUDESONIDE 500 MCG: 0.5 INHALANT RESPIRATORY (INHALATION) at 20:30

## 2025-04-18 RX ADMIN — WATER 60 MG: 1 INJECTION INTRAMUSCULAR; INTRAVENOUS; SUBCUTANEOUS at 18:34

## 2025-04-18 RX ADMIN — MEMANTINE 5 MG: 10 TABLET ORAL at 11:56

## 2025-04-18 RX ADMIN — SERTRALINE 50 MG: 50 TABLET, FILM COATED ORAL at 12:19

## 2025-04-18 RX ADMIN — IPRATROPIUM BROMIDE AND ALBUTEROL SULFATE 1 DOSE: 2.5; .5 SOLUTION RESPIRATORY (INHALATION) at 15:13

## 2025-04-18 RX ADMIN — PIPERACILLIN AND TAZOBACTAM 3375 MG: 3; .375 INJECTION, POWDER, LYOPHILIZED, FOR SOLUTION INTRAVENOUS at 20:49

## 2025-04-18 RX ADMIN — MEMANTINE 5 MG: 10 TABLET ORAL at 20:49

## 2025-04-18 RX ADMIN — PIPERACILLIN AND TAZOBACTAM 3375 MG: 3; .375 INJECTION, POWDER, LYOPHILIZED, FOR SOLUTION INTRAVENOUS at 05:13

## 2025-04-18 RX ADMIN — ATORVASTATIN CALCIUM 20 MG: 20 TABLET, FILM COATED ORAL at 20:49

## 2025-04-18 RX ADMIN — SODIUM CHLORIDE, PRESERVATIVE FREE 10 ML: 5 INJECTION INTRAVENOUS at 20:50

## 2025-04-18 RX ADMIN — CARBIDOPA AND LEVODOPA 1 TABLET: 25; 100 TABLET, EXTENDED RELEASE ORAL at 20:49

## 2025-04-18 ASSESSMENT — PAIN SCALES - GENERAL
PAINLEVEL_OUTOF10: 0
PAINLEVEL_OUTOF10: 0

## 2025-04-18 NOTE — PROGRESS NOTES
Bedside and Verbal shift change report given to Thais (oncoming nurse) by Reginaldo (offgoing nurse). Report included the following information Nurse Handoff Report, Intake/Output, MAR, Recent Results, Cardiac Rhythm NSR ST, Neuro Assessment, and Event Log.     0900 Pt's verma with cherry red output, Urology here, flushed manually with return of several clots, urine cleared a little to pink lemonade.    1140 Urology here to replace verma with 3 way catheter, clots noted to end of old catheter. One time dose of Dilaudid and uroject used for insertion. Pt tolerated well. Pt 88-91% on heated high flow nasal canula at 60L/100%. Pt breathing with mouth open. Pt tachycardic, HR high 90s to low 110s. Family at bedside. Pt able to take medications whole with Enusre. Drank entire Ensure for breakfast, drank another Ensure with pills. Holding enoxaparin and aspirin per Urology.    1335 verma draining cherry red urine. I irrigated using steril etechnique with 250mL saline, some small clots out. I pulled 150 out and emptied 250 out of verma bag. Still cherry    1600 Pt rolled and turned and linens changed./ Verma draining sediment and light cherry urine. I irrigated using sterile technique with 250mL sterile saline. I got no clots and urine cleared to very faint pink but felt like there was a clot. I was unable to aspirate. Urine flowing freely, pink. I aspirated 150mL.    1715 Pt's nasal canula became dislodged, sats dropped to 85% and lower. Repositioned pt. Pt asked for something to drink, drank whole container of Ensure and a little Pepsi. Family in to visit.

## 2025-04-18 NOTE — PROGRESS NOTES
Comprehensive Nutrition Assessment    Type and Reason for Visit:  Reassess    Nutrition Recommendations/Plan:   Discontinue GI bland restrictions, diet consistency per SLP   Continue ensure plus high protein TID     Malnutrition Assessment:  Malnutrition Status:  Moderate malnutrition (04/15/25 1548)    Context:  Acute Illness     Findings of the 6 clinical characteristics of malnutrition:  Energy Intake:  50% or less of estimated energy requirements for 5 or more days  Weight Loss:  Unable to assess     Body Fat Loss:  Mild body fat loss Orbital   Muscle Mass Loss:  Mild muscle mass loss Clavicles (pectoralis & deltoids), Temples (temporalis)  Fluid Accumulation:  No fluid accumulation     Strength:  Not Performed    Nutrition Assessment:    Chart reviewed; SLP at bedside at time of attempted visit. Continues on a GI bland diet. Ensure plus high protein ordered TID for supplementation. Patient now requiring 60L HHFNC. Discussed possible palliative care consult during PCU rounds. Poor PO intake continues and hx of intermittent dysphagia, dementia, and Parkinson's; met criteria for malnutrition at time of last RD assessment. Consider goals of care discussion regarding short term and long term nutrition support.     Patient Vitals for the past 120 hrs:   PO Meals Eaten (%)   04/17/25 1815 0%   04/17/25 1300 0%   04/17/25 0924 0%   04/14/25 2146 0%   04/14/25 1519 76 - 100%   04/13/25 1730 1 - 25%   04/13/25 1300 1 - 25%     Nutrition Related Findings:    -750-600-123    4/17   Atorvastatin, Sinemet, Solu-medrol, Aricept, Protonix, Glycolax, Namenda, Seroquel, Zoloft   Wound Type: None       Current Nutrition Intake & Therapies:    Average Meal Intake: 0%, 1-25%  Average Supplements Intake: Unable to assess  ADULT DIET; Regular; GI [place] (GERD/Peptic Ulcer)  ADULT ORAL NUTRITION SUPPLEMENT; Breakfast, Lunch, Dinner; Standard High Calorie/High Protein Oral Supplement    Anthropometric Measures:  Height:

## 2025-04-18 NOTE — PROGRESS NOTES
[facility]      Patient: Jonatan Rausch Jr. MRN: [id number]  SSN: [id number]    YOB: 1945  Age: 80 y.o.  Sex: male        ADMITTED: 4/7/2025 to Katty Garcia MD by Kg Domínguez Jr., MD for Sepsis (HCC) [A41.9]  Community acquired pneumonia, unspecified laterality [J18.9]    Assessment:   Gross hematuria suspect multifactorial catheter trauma/ BPH/ ASA and Lovenox  Acute Urinary retention  BPH with LUTS- followed by Dr. Auguste   - prostate vol measures 87cc.    PABLO:nephrology following  CHF  Anemia      Recommendations:   Continue to hold anticoagulation/ ASA  Plan to upsize catheter with 3 way and monitor need for CBI  Continue with manual irrigation q 4hrs PRN clots  Urojet and dilaudid 0.5 mg IV prior to verma catheter exchange    11:57 AM  -patient continues with cherry red urine and small clots in tubing.  -per RN ASA was given last night.  - 24 Fr 3 way verma placed with 30 cc balloon, manually irrigated with 180 ml sterile NS with return of clear pink lemonade colored urine.  Patient tolerated procedure well.  - Discussed with primary RN if worsening hematuria/ retention will plan for CBI.  - continue with manual irrigation q 4hrs and PRN for now.    3:24 PM  - Reassessed patient urine is clear pale cherry red small sediment noted. Will  continue with manual  irrigations  at this time.    Reviewed with supervising MD: Dr. Paula  Interval History     Jonatan Rausch Jr. is seen and examine this AM he continues with hematuria. He denies any discomfort or pain. No bladder distention. Hgb 8.4 today- Creatinine pending. Bladder scan 15 ml per primary RN.    Current Medications: all current  Medications have been eviewed in EPIC  Review of Systems: Pertinent items are noted in HPI.    Objective:  Vitals:    Vitals:    04/18/25 0357 04/18/25 0519 04/18/25 0726 04/18/25 0844   BP:       Pulse: 93   (!) 101   Resp: (!) 31   25   Temp:   97.5 °F (36.4

## 2025-04-18 NOTE — PROGRESS NOTES
Pulmonary Progress Note    Patient: Jonatan Rausch Jr.                     YOB: 1945        Date- 4/18/2025                           Admit Date: 4/7/2025       CC: Follow up for hypoxia, pulmonary edema and pleural effusions.        IMPRESSION & PLAN:     Acute hypoxic respiratory failure  Pulmonary edema and CAP  Bilateral pleural effusions  Sleep apnea, on CPAP  PABLO  CHF    Plan    Acute hypoxic respiratory failure with no significant improvement, still requiring high FiO2.  Alternating between heated high flow nasal cannula and noninvasive ventilator.  Continue close monitoring, may need ICU if respiratory status declines.  Improving pulmonary infiltrates, especially in the right upper lung and traces of effusions on chest x-ray 4/16.  Unchanged on 4/18 . suspect inflammatory/infectious infiltrates in addition to cardiogenic edema  Elevated CRP , Respiratory viral panel, pneumonia antigens, blood cultures and procalcitonin negative.  Continue empirical antibiotics and steroids.  Prognosis is poor with prolonged acute respiratory failure requiring noninvasive ventilator with suspected sepsis and PABLO in the setting of severe CHF and advanced age.  Recommend involving palliative care.        Medical Decision Making  Problems Addressed:  Acute respiratory failure with hypoxia: acute illness or injury  Community acquired pneumonia, unspecified laterality: acute illness or injury  Congestive heart failure, unspecified HF chronicity, unspecified heart failure type (HCC): acute illness or injury  Pleural effusion: acute illness or injury     Amount and/or Complexity of Data Reviewed  External Data Reviewed: labs and notes.     Details: Multiple labs, nephro notes  Radiology: Interpreted     Details: Chest x-ray  Discussion of management or test interpretation with external provider(s): Hospitalist     Risk  Prescription drug management.  Risk Details: Severe acute respiratory failure requiring

## 2025-04-18 NOTE — CARE COORDINATION
Transition of Care Plan:    RUR: 19% medium Risk  Prior Level of Functioning: Independent in ADLs/IADLs  Disposition: home with [facility] ([facility]) Phone: [phone] soc within 48 hours  FAM: 4/23  If SNF or IPR: Date FOC offered: na  Date FOC received:   Accepting facility:   Date authorization started with reference number:   Date authorization received and expires:   Follow up appointments: pcp/specialist   DME needed: none  Transportation at discharge: the patient's family  IM/IMM Medicare/ letter given: 2nd IM upon discharge  Is patient a  and connected with [facility]? na   If yes, was [place] transfer form completed and [facility] notified? na  Caregiver Contact:  shree luke (Spouse) [phone]  Discharge Caregiver contacted prior to discharge? The patient to contact  Care Conference needed? no  Barriers to discharge: medical clearance    The  (CM) noted no further new needs or concerns at this time. However, the CM emphasized their continued availability and willingness to assist should any needs or issues arise in the future.      PT/OT recommendation calls for Intermittent occupational therapy up to 2-3x/week in previous living setting with additional support needed for safety w/ ADL progression. Due to the patient's challenging geographic location, a mass referral to be made to secure a home health agency. The patient has been arranged with [facility] ([facility]) Phone: [phone] soc within 48 hours.     Pt resides with spouse in a one level home with 8 BEHZAD. Pt states he is independent with ADL's and is an active .        Tony Luke RN  Case Management  [phone]

## 2025-04-18 NOTE — PROGRESS NOTES
Nephrology Progress Note  [facility] [place] [facility]  [address], [address]  [place] [address]  Phone - [phone]  Fax - [phone]                   Patient: Jonatan Rausch Jr.                     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  History of CAD, CABG  History of Parkinson's disease      PLAN-  No lasix today  check serology  Keep foly inplace  Follow bmp  Urology input noted  Kcl 40 meq     Subjective:  Interval History:   He has hematuria  Good urine output    Objective:   Vitals:    04/18/25 1000 04/18/25 1100 04/18/25 1132 04/18/25 1200   BP: 126/86   122/65   Pulse: (!) 106  (!) 114 (!) 103   Resp: 28  27 22   Temp:  97.8 °F (36.6 °C)     TempSrc:       SpO2: 95%  90% 90%   Weight:       Height:          I/O last 3 completed shifts:  In: 2140.1 [P.O.:1100; I.V.:618.1; IV Piggyback:422]  Out: 4823 [Urine:4823]  I/O this shift:  In: -   Out: 380 [Urine:380]      Physical exam:    GEN: nad  NECK- no mass  RESP: no wheezing, decreased BS b/l  CVS: S1,S2  RRR  NEURO: Normal speech, Non focal  EXT: + Edema   PSYCH: Normal Mood    Chart reviewed.         Pertinent Notes reviewed.       Data Review :  Lab Results   Component Value Date/Time     04/18/2025 09:52 AM    K 3.4 04/18/2025 09:52 AM     04/18/2025 09:52 AM    CO2 32 04/18/2025 09:52 AM    BUN 78 04/18/2025 09:52 AM    CREATININE 1.78 04/18/2025 09:52 AM    GLUCOSE 130 04/18/2025 09:52 AM    CALCIUM 9.3 04/18/2025 09:52 AM       Lab Results   Component Value Date    WBC 17.1 (H) 04/18/2025    HGB 8.6 (L) 04/18/2025    HCT 28.2 (L) 04/18/2025    MCV 84.4 04/18/2025     04/18/2025      Recent Labs     04/16/25  0225  04/17/25  0503 04/18/25  0952    138 143   K 4.8 4.3 3.4*    105 106   CO2 28 26 32   GLUCOSE 151* 149* 130*   BUN 61* 78* 78*   CREATININE 2.81* 2.31* 1.78*   CALCIUM 8.6 9.1 9.3       Recent Labs     04/16/25  0227 04/17/25  1417 04/18/25  0204   WBC 11.3* 20.9* 17.1*   RBC 3.98* 3.43* 3.34*   HGB 10.4* 8.9* 8.6*   HCT 32.9* 28.4* 28.2*   MCV 82.7 82.8 84.4   MCH 26.1 25.9* 25.7*   MCHC 31.6 31.3 30.5   RDW 16.2* 16.5* 16.8*    197 184   MPV 11.3 11.4 11.3     Lab Results   Component Value Date/Time    IRON 12 (L) 04/15/2025 05:01 PM    TIBC 294 04/15/2025 05:01 PM     No results found for: \"PTH\"  No results found for: \"LABA1C\"  Lab Results   Component Value Date/Time    COLORU YELLOW/STRAW 04/29/2024 06:08 PM    GLUCOSEU Negative 04/29/2024 06:08 PM    BILIRUBINUR Negative 04/29/2024 06:08 PM    KETUA 15 (A) 04/29/2024 06:08 PM    BLOODU Negative 04/29/2024 06:08 PM    PHUR 6.5 04/29/2024 06:08 PM    PROTEINU Negative 04/29/2024 06:08 PM    NITRU Negative 04/29/2024 06:08 PM    LEUKOCYTESUR Negative 04/29/2024 06:08 PM       US Results (most recent):    Medication list  reviewed  Current Facility-Administered Medications   Medication Dose Route Frequency    piperacillin-tazobactam (ZOSYN) 3,375 mg in sodium chloride 0.9 % 50 mL IVPB (addEASE)  3,375 mg IntraVENous Q8H    iron sucrose (VENOFER) injection 200 mg  200 mg IntraVENous Daily    [Held by provider] enoxaparin Sodium (LOVENOX) injection 30 mg  30 mg SubCUTAneous Daily    methylPREDNISolone sodium succ (SOLU-MEDROL) 60 mg in sterile water 0.96 mL injection  60 mg IntraVENous Q6H    ipratropium 0.5 mg-albuterol 2.5 mg (DUONEB) nebulizer solution 1 Dose  1 Dose Inhalation Q4H WA RT    [Held by provider] furosemide (LASIX) injection 40 mg  40 mg IntraVENous BID    pantoprazole (PROTONIX) tablet 40 mg  40 mg Oral QAM AC    azithromycin (ZITHROMAX) tablet 500 mg  500 mg Oral Daily    QUEtiapine (SEROQUEL) tablet 25 mg  25 mg Oral QPM

## 2025-04-18 NOTE — PROGRESS NOTES
Hospitalist Progress Note    NAME:   Jonatan Rausch Jr.   : 1945   MRN: [id number]     Date/Time: 2025 2:25 PM  Patient PCP: Kenny Barksdale MD    Estimated discharge date:  Barriers: Improvement in respiratory status      Assessment / Plan:  Acute hypoxic respiratory failure worsening today  Acute on chronic HFrEF vs Pneumonia  - Initial CT showed multifocal pneumonia BL effusions, with low procalcitonin level and leukocytosis  - BNP of 13,000, echocardiogram showed EF of 23 percent  - Started on Lasix 40 mg twice daily on 4/10 , switch Rocephin to Zosyn  on , Lasix has been may held  due to rising creatinine  -Echo preliminary report of EF 15-20%  -Respiratory panel negative,   - metolazone 5 mg daily on , discontinue metolazone on  due to PABLO  - Repeat CT on   showed: Decreased bilateral pleural effusions with increased parenchymal groundglass throughout the lungs bilaterally right greater than left, suggestive of edema, less likely diffuse alveolar infection.  -Consulted cardiology on   -IV lasix held due to bump Cr  -Continue IV zosyn and zithromax  -Continue IV solumedrol q6  -Check MRSA screen - pending  -HFNC, NIV prn  -pulmonary following, appreciate help  : Patient remains on high flow oxygen 60liter at 100%, high risk of further decompensation  Lasix currently on hold due to rising creatinine  Palliative care consulted for goals of care discussion  PABLO  Urine retention  Hematuria due to catheter trauma  -overdiuresed vs ATN vs Cardiorenal  -CR 1.0-->1.4-->2.4-->2.8  -holding lasix  -straight cath x 2 --> verma inserted   -Nephrology input appreciated  -Urology consulted.  Manual irrigation after placing 24 Polish three-way Verma    Intermittent dysphagia  - Consulted with GI  -Barium swallow 24  -There is no evidence of esophageal stricture or significant esophageal dysmotility.  - Given patient suffering from pneumonia and dysphagia is  intermittent without pain, recommend outpatient follow-up  -- start with SLP eval     Iron deficiency anemia  -iron 12, TIBC 294, 4%iron sat  -IV iron sucrose 200mg daily x 3  -GI eval pending medical stabillity    History of Parkinson's disease  Dementia  --Wife reports progression of disease  -- Continue Sinemet  -- Continue Aricept and Namenda  PT OT evaluation     Essential hypertension  Hyperlipidemia  History of coronary artery disease status post CABG       Code status:Full    Medical Decision Making:   I personally reviewed labs: CBC BMP  I personally reviewed imaging: Chest x-ray  I personally reviewed EKG:  Toxic drug monitoring: BMP on Lasix  Discussed case with: updated pt wife over phone, all questions answered   Code Status: Full code  DVT Prophylaxis:   GI Prophylaxis:    Subjective:     Chief Complaint / Reason for Physician Visit  Follow up AHRF, CHF vs PNA, PABLO, urine retention   Still on high flow 60liter   Hypoxic     Objective:     VITALS:   Last 24hrs VS reviewed since prior progress note. Most recent are:  Patient Vitals for the past 24 hrs:   BP Temp Temp src Pulse Resp SpO2 Weight   04/18/25 1200 122/65 -- -- (!) 103 22 90 % --   04/18/25 1132 -- -- -- (!) 114 27 90 % --   04/18/25 1100 -- 97.8 °F (36.6 °C) -- -- -- -- --   04/18/25 1000 126/86 -- -- (!) 106 28 95 % --   04/18/25 0927 -- -- -- (!) 102 23 (!) 89 % --   04/18/25 0844 -- -- -- (!) 101 25 90 % --   04/18/25 0800 109/71 -- -- 99 20 92 % --   04/18/25 0730 122/87 -- -- (!) 101 23 93 % --   04/18/25 0726 -- 97.5 °F (36.4 °C) Axillary -- -- -- --   04/18/25 0519 -- -- -- -- -- -- 71.6 kg (157 lb 13.6 oz)   04/18/25 0357 -- -- -- 93 (!) 31 94 % --   04/18/25 0310 100/80 98.1 °F (36.7 °C) Axillary 99 26 -- --   04/18/25 0043 -- -- -- 100 27 92 % --   04/18/25 0000 -- 98.4 °F (36.9 °C) -- -- -- -- --   04/17/25 2310 115/69 98.3 °F (36.8 °C) Oral (!) 103 27 -- --   04/17/25 2000 127/69 97.9 °F (36.6 °C) Axillary (!) 114 14 -- --

## 2025-04-18 NOTE — PROGRESS NOTES
Occupational Therapy  Medical record reviewed.  Pt is not medically stable at this time for OT treatment.  He is off BiPAP, now on 60L with FIO2 at 100%.  Nurse concurs with deferring therapy today.

## 2025-04-18 NOTE — CONSULTS
Palliative Medicine  Patient Name: Jonatan Rausch Jr.  YOB: 1945  MRN: [id number]  Age: 80 y.o.  Gender: male    Date of Initial Consult: 4/18/25  Date of Service: 4/18/2025  Time: 4:33 PM  Provider: Clint Saavedra MD  Hospital Day: 12  Admit Date: 4/7/2025  Referring Provider: Dr. Garcia       Reasons for Consultation:  Goals of Care    HISTORY OF PRESENT ILLNESS (HPI):   Jonatan Rausch Jr. is a 80 y.o. male with a past medical history of dementia-appears mixed with Alzheimer's/vascular per neurology notes, coronary disease, congestive heart failure, hypertension, hyperlipidemia, who was admitted on 4/7/2025 from home with a diagnosis of respiratory failure.     Patient is a 80-year-old male with a history of underlying dementia-reviewed outpatient notes and have concerns about Alzheimer's/vascular but there also was question about Parkinson's in notes from the fall.  He presents with respiratory failure-likely exacerbation of acute on chronic systolic heart failure along with pneumonia.  Echocardiogram has shown EF of 15 to 20%.  Patient has been in the hospital for 11 days and palliative was consulted on 4/18.  He has had multiple issues during his hospitalization to include acute kidney injury, need for noninvasive ventilation along with high flow nasal cannula.  He also has had problems with urinary retention, catheter trauma, intermittent dysphagia, anemia and now our team was consulted for goals of care    Psychosocial: Patient is  to [place].      PALLIATIVE DIAGNOSES:    Palliative medicine discussion  Goals of care discussion  Acute hypoxic respiratory failure-currently on high flow-maximized at 60 L / 100%    ASSESSMENT AND PLAN:   Chart reviewed thoroughly as patient has been here for 11 days prior to our consultation.  I checked on patient-he appears tired, could tell me his name and where he was but fatigues very easily-once again on high flow nasal cannula at 60 L / 100%.  Talked

## 2025-04-18 NOTE — PROGRESS NOTES
Physical Therapy Note    PT tx deferred due to medical instability. Patient is off of Bipap but being maintained on 60L/min with FIO2 100%. RN in agreement.

## 2025-04-18 NOTE — PLAN OF CARE
Speech LAnguage Pathology EVALUATION    Patient: Jonatan Rausch Jr. (80 y.o. male)  Date: 4/18/2025  Primary Diagnosis: Sepsis (HCC) [A41.9]  Community acquired pneumonia, unspecified laterality [J18.9]       Precautions:  Fall Risk                  ASSESSMENT :  Patient presents with increased risk of aspiration given h/o Parkinson's disease and current high O2 requirement (HHFNC 60L/100%). Patient with hypophonia and appeared difficulty coordination between breathing and speaking pattern - likely d/t current O2 requirement. Regular breakfast tray at bedside table. Patient masticating a bite of waffle upon SLP arrival. Mastication was significantly prolonged and inefficient. Patient initially with no overt clinical s/s aspiration noted across thin liquids. However, with continuation of PO trials, patient with weak cough following consecutive sips thin liquid. SLP educated patient on relationship between breathing and swallowing with current increased risk of aspiration. Discussed completion of FEES to further evaluate swallow function. Patient pleasantly refused and requesting to continue PO with monitoring. SLP educated patient on s/s aspiration and risks of aspiration. Recommend downgrade to full liquid diet with low threshold for NPO if continued/increased risk of aspiration. Discussed with RN. SLP will continue to follow closely.     Patient will benefit from skilled intervention to address the above impairments.     PLAN :  Recommendations and Planned Interventions:  Diet: Full liquid and thin liquids  --Closely monitor diet tolerance and swallow safety, if s/s aspiration or changes in respiratory status (I.e. increased O2 requirements, changes in lung sounds, changes in chest imaging), make NPO and notify SLP.   --Medications crushed in puree (I.e. applesauce), if approved by pharmacy   --Upright all PO intake   --Single sips   --Slow rate  --Oral hygiene 2-3x/day       Recommend next SLP session: swallow

## 2025-04-18 NOTE — PROGRESS NOTES
End of Shift Note    Bedside shift change report given to Thais DINERO (oncoming nurse) by Reginaldo Moseley RN (offgoing nurse).  Report included the following information SBAR, Kardex, Intake/Output, MAR, Med Rec Status, Cardiac Rhythm NSR, Alarm Parameters , and Quality Measures    Shift worked:  6894-1102     Shift summary and any significant changes:    Still on bipap and tends to desaturate with each disconnection,had afoley placed earlier during the day and has been having haematuria which has been clearing,manual irrigation in progress ie q4hrs with no any blood clots or any active bleeding noticed so far. Hemoglobin at 8.0 so far       Concerns for physician to address:         Zone phone for oncoming shift:            Activity:  Level of Assistance: Moderate assist, patient does 50-74%  Number times ambulated in hallways past shift: 0  Number of times OOB to chair past shift: 0    Cardiac:   Cardiac Monitoring: Yes      Cardiac Rhythm: Sinus tachy, Vent trigeminy    Access:  Current line(s): PIV     Genitourinary:   Urinary Status: Draining, Adkins    Respiratory:   O2 Device: PAP (positive airway pressure)  Chronic home O2 use?: NO  Incentive spirometer at bedside: YES    GI:  Last BM (including prior to admit): 04/17/25  Current diet:  ADULT DIET; Regular; GI [place] (GERD/Peptic Ulcer)  ADULT ORAL NUTRITION SUPPLEMENT; Breakfast, Lunch, Dinner; Standard High Calorie/High Protein Oral Supplement  Passing flatus: YES    Pain Management:   Patient states pain is manageable on current regimen: YES    Skin:  Krishna Scale Score: 14  Interventions: Wound Offloading (Prevention Methods): Bed, pressure redistribution/air, Bed, pressure reduction mattress    Patient Safety:  Fall Risk: Nursing Judgement-Fall Risk High(Add Comments): Yes  Fall Risk Interventions  Nursing Judgement-Fall Risk High(Add Comments): Yes  Toilet Every 2 Hours-In Advance of Need: Yes  Hourly Visual Checks: Eyes closed  Fall Visual

## 2025-04-18 NOTE — PROGRESS NOTES
End of Shift Note    Bedside shift change report given to ramón DINERO (oncoming nurse) by Jorge Serrato RN (offgoing nurse).  Report included the following information SBAR    Shift worked:  Day shift      Shift summary and any significant changes:    Pt aox4. On continuous BiPAP. Incontinent of stool. Adkins catheter placed for retention. Bloody urine draining. Irrigating every 4 hours.      Concerns for physician to address:  Increasing oxygen requirements.      Zone phone for oncoming shift:          Activity:  Level of Assistance: Moderate assist, patient does 50-74%  Number times ambulated in hallways past shift: 0  Number of times OOB to chair past shift: 2    Cardiac:   Cardiac Monitoring: Yes      Cardiac Rhythm: Sinus tachy    Access:  Current line(s): PIV    Genitourinary:   Urinary Status: Voiding, Dakins    Respiratory:   O2 Device: PAP (positive airway pressure)  Chronic home O2 use?: NO  Incentive spirometer at bedside: YES    GI:  Last BM (including prior to admit): 04/17/25  Current diet:  ADULT DIET; Regular; GI [place] (GERD/Peptic Ulcer)  ADULT ORAL NUTRITION SUPPLEMENT; Breakfast, Lunch, Dinner; Standard High Calorie/High Protein Oral Supplement  Passing flatus: YES    Pain Management:   Patient states pain is manageable on current regimen: YES    Skin:  Krishna Scale Score: 14  Interventions: Wound Offloading (Prevention Methods): Bed, pressure reduction mattress, Elevate heels, Pillows, Repositioning, Turning    Patient Safety:  Fall Risk: Nursing Judgement-Fall Risk High(Add Comments): Yes  Fall Risk Interventions  Nursing Judgement-Fall Risk High(Add Comments): Yes  Toilet Every 2 Hours-In Advance of Need: Yes  Hourly Visual Checks: Eyes closed, In bed  Fall Visual Posted: Armband, Fall sign posted  Room Door Open: Yes  Alarm On: Bed  Patient Moved Closer to Nursing Station: No    Active Consults:   IP CONSULT TO GI  IP CONSULT TO PULMONOLOGY  IP CONSULT TO CARDIOLOGY  IP CONSULT TO CASE

## 2025-04-18 NOTE — PLAN OF CARE
Problem: Skin/Tissue Integrity  Goal: Skin integrity remains intact  Outcome: Progressing  Flowsheets (Taken 4/17/2025 2000)  Skin Integrity Remains Intact:   Monitor for areas of redness and/or skin breakdown   Assess vascular access sites hourly     Problem: Neurosensory - Adult  Goal: Achieves stable or improved neurological status  Outcome: Progressing  Goal: Achieves maximal functionality and self care  Outcome: Progressing     Problem: Respiratory - Adult  Goal: Achieves optimal ventilation and oxygenation  Outcome: Progressing  Flowsheets (Taken 4/17/2025 2000)  Achieves optimal ventilation and oxygenation:   Assess for changes in respiratory status   Assess for changes in mentation and behavior     Problem: Cardiovascular - Adult  Goal: Maintains optimal cardiac output and hemodynamic stability  Outcome: Progressing  Flowsheets (Taken 4/17/2025 2000)  Maintains optimal cardiac output and hemodynamic stability: Monitor blood pressure and heart rate  Goal: Absence of cardiac dysrhythmias or at baseline  Outcome: Progressing  Flowsheets (Taken 4/17/2025 2000)  Absence of cardiac dysrhythmias or at baseline: Monitor cardiac rate and rhythm     Problem: Skin/Tissue Integrity - Adult  Goal: Skin integrity remains intact  Outcome: Progressing  Flowsheets (Taken 4/17/2025 2000)  Skin Integrity Remains Intact:   Monitor for areas of redness and/or skin breakdown   Assess vascular access sites hourly  Goal: Incisions, wounds, or drain sites healing without S/S of infection  Outcome: Progressing  Flowsheets (Taken 4/17/2025 2000)  Incisions, Wounds, or Drain Sites Healing Without Sign and Symptoms of Infection:   ADMISSION and DAILY: Assess and document risk factors for pressure ulcer development   TWICE DAILY: Assess and document skin integrity

## 2025-04-18 NOTE — PROGRESS NOTES
End of Shift Note    Bedside shift change report given to Reginaldo (oncoming nurse) by Thais Ariza RN (offgoing nurse).  Report included the following information SBAR, Intake/Output, MAR, Recent Results, and Cardiac Rhythm ST    Shift worked:  5627-7252     Shift summary and any significant changes:     Pt had 2 way verma exchanged for 24g 3 way Verma. Manually irrigating at this time, red flecks and sediment noted, fewer clots than this morning. Urine continues to flow. Pt was on HHF 60L/100% most of the shift, going back on PAP at the end of shift. Had visits from family. Poor appetite but enjoying drinking liquids; drank 4x Ensure today.     Concerns for physician to address:  x     Zone phone for oncoming shift:   x       Activity:  Level of Assistance: Moderate assist, patient does 50-74%  Number times ambulated in hallways past shift: 0  Number of times OOB to chair past shift: 0    Cardiac:   Cardiac Monitoring: Yes      Cardiac Rhythm: Sinus tachy, Vent trigeminy    Access:  Current line(s): PIV     Genitourinary:   Urinary Status: Verma    Respiratory:   O2 Device: Heated high flow cannula  Chronic home O2 use?: NO  Incentive spirometer at bedside: YES    GI:  Last BM (including prior to admit): 04/17/25  Current diet:  ADULT ORAL NUTRITION SUPPLEMENT; Breakfast, Lunch, Dinner; Standard High Calorie/High Protein Oral Supplement  ADULT DIET; Full Liquid  Passing flatus: YES    Pain Management:   Patient states pain is manageable on current regimen: YES    Skin:  Krishna Scale Score: 14  Interventions: Wound Offloading (Prevention Methods): Bed, pressure redistribution/air, Bed, pressure reduction mattress    Patient Safety:  Fall Risk: Nursing Judgement-Fall Risk High(Add Comments): Yes  Fall Risk Interventions  Nursing Judgement-Fall Risk High(Add Comments): Yes  Toilet Every 2 Hours-In Advance of Need: Yes  Hourly Visual Checks: Awake, In bed, Quiet  Fall Visual Posted: Armband, Socks  Room Door

## 2025-04-19 ENCOUNTER — APPOINTMENT (OUTPATIENT)
Facility: HOSPITAL | Age: 80
DRG: 871 | End: 2025-04-19
Payer: MEDICARE

## 2025-04-19 LAB
ANA SER QL: NEGATIVE
ANION GAP SERPL CALC-SCNC: 5 MMOL/L (ref 2–12)
APPEARANCE UR: ABNORMAL
ARTERIAL PATENCY WRIST A: YES
BACTERIA URNS QL MICRO: ABNORMAL /HPF
BASE EXCESS BLDA CALC-SCNC: 5.4 MMOL/L
BASOPHILS # BLD: 0.02 K/UL (ref 0–0.1)
BASOPHILS NFR BLD: 0.1 % (ref 0–1)
BDY SITE: ABNORMAL
BILIRUB UR QL: NEGATIVE
BUN SERPL-MCNC: 87 MG/DL (ref 6–20)
BUN/CREAT SERPL: 46 (ref 12–20)
C3 SERPL-MCNC: 102 MG/DL (ref 82–167)
C4 SERPL-MCNC: 16 MG/DL (ref 12–38)
CALCIUM SERPL-MCNC: 9.4 MG/DL (ref 8.5–10.1)
CHLORIDE SERPL-SCNC: 108 MMOL/L (ref 97–108)
CO2 SERPL-SCNC: 31 MMOL/L (ref 21–32)
COLOR UR: ABNORMAL
CREAT SERPL-MCNC: 1.89 MG/DL (ref 0.7–1.3)
DIFFERENTIAL METHOD BLD: ABNORMAL
EOSINOPHIL # BLD: 0 K/UL (ref 0–0.4)
EOSINOPHIL NFR BLD: 0 % (ref 0–7)
EPITH CASTS URNS QL MICRO: ABNORMAL /LPF
ERYTHROCYTE [DISTWIDTH] IN BLOOD BY AUTOMATED COUNT: 16.8 % (ref 11.5–14.5)
GLUCOSE SERPL-MCNC: 147 MG/DL (ref 65–100)
GLUCOSE UR STRIP.AUTO-MCNC: NEGATIVE MG/DL
HCO3 BLDA-SCNC: 28 MMOL/L (ref 22–26)
HCT VFR BLD AUTO: 30.8 % (ref 36.6–50.3)
HGB BLD-MCNC: 9.4 G/DL (ref 12.1–17)
HGB UR QL STRIP: ABNORMAL
IMM GRANULOCYTES # BLD AUTO: 0.18 K/UL (ref 0–0.04)
IMM GRANULOCYTES NFR BLD AUTO: 0.8 % (ref 0–0.5)
KETONES UR QL STRIP.AUTO: NEGATIVE MG/DL
LEUKOCYTE ESTERASE UR QL STRIP.AUTO: ABNORMAL
LYMPHOCYTES # BLD: 0.38 K/UL (ref 0.8–3.5)
LYMPHOCYTES NFR BLD: 1.7 % (ref 12–49)
MCH RBC QN AUTO: 25.8 PG (ref 26–34)
MCHC RBC AUTO-ENTMCNC: 30.5 G/DL (ref 30–36.5)
MCV RBC AUTO: 84.4 FL (ref 80–99)
MONOCYTES # BLD: 0.75 K/UL (ref 0–1)
MONOCYTES NFR BLD: 3.3 % (ref 5–13)
NEUTS SEG # BLD: 21.27 K/UL (ref 1.8–8)
NEUTS SEG NFR BLD: 94.1 % (ref 32–75)
NITRITE UR QL STRIP.AUTO: NEGATIVE
NRBC # BLD: 0 K/UL (ref 0–0.01)
NRBC BLD-RTO: 0 PER 100 WBC
PCO2 BLDA: 36 MMHG (ref 35–45)
PH BLDA: 7.52 (ref 7.35–7.45)
PH UR STRIP: 5.5 (ref 5–8)
PLATELET # BLD AUTO: 183 K/UL (ref 150–400)
PMV BLD AUTO: 12 FL (ref 8.9–12.9)
PO2 BLDA: 64 MMHG (ref 80–100)
POTASSIUM SERPL-SCNC: 3.8 MMOL/L (ref 3.5–5.1)
PROT UR STRIP-MCNC: 100 MG/DL
RBC # BLD AUTO: 3.65 M/UL (ref 4.1–5.7)
RBC #/AREA URNS HPF: >100 /HPF (ref 0–5)
RBC MORPH BLD: ABNORMAL
RBC MORPH BLD: ABNORMAL
SAO2 % BLD: 94 % (ref 92–97)
SAO2% DEVICE SAO2% SENSOR NAME: ABNORMAL
SODIUM SERPL-SCNC: 144 MMOL/L (ref 136–145)
SP GR UR REFRACTOMETRY: 1.02
SPECIMEN SITE: ABNORMAL
UROBILINOGEN UR QL STRIP.AUTO: 0.2 EU/DL (ref 0.2–1)
WBC # BLD AUTO: 22.6 K/UL (ref 4.1–11.1)
WBC URNS QL MICRO: ABNORMAL /HPF (ref 0–4)

## 2025-04-19 PROCEDURE — 94640 AIRWAY INHALATION TREATMENT: CPT

## 2025-04-19 PROCEDURE — 2500000003 HC RX 250 WO HCPCS: Performed by: INTERNAL MEDICINE

## 2025-04-19 PROCEDURE — 6360000002 HC RX W HCPCS: Performed by: PHYSICIAN ASSISTANT

## 2025-04-19 PROCEDURE — 80048 BASIC METABOLIC PNL TOTAL CA: CPT

## 2025-04-19 PROCEDURE — 36415 COLL VENOUS BLD VENIPUNCTURE: CPT

## 2025-04-19 PROCEDURE — 85025 COMPLETE CBC W/AUTO DIFF WBC: CPT

## 2025-04-19 PROCEDURE — 2060000000 HC ICU INTERMEDIATE R&B

## 2025-04-19 PROCEDURE — 36600 WITHDRAWAL OF ARTERIAL BLOOD: CPT

## 2025-04-19 PROCEDURE — 6370000000 HC RX 637 (ALT 250 FOR IP): Performed by: INTERNAL MEDICINE

## 2025-04-19 PROCEDURE — 81001 URINALYSIS AUTO W/SCOPE: CPT

## 2025-04-19 PROCEDURE — 6360000002 HC RX W HCPCS: Performed by: INTERNAL MEDICINE

## 2025-04-19 PROCEDURE — 94660 CPAP INITIATION&MGMT: CPT

## 2025-04-19 PROCEDURE — 71045 X-RAY EXAM CHEST 1 VIEW: CPT

## 2025-04-19 PROCEDURE — 82803 BLOOD GASES ANY COMBINATION: CPT

## 2025-04-19 PROCEDURE — 83516 IMMUNOASSAY NONANTIBODY: CPT

## 2025-04-19 PROCEDURE — 86037 ANCA TITER EACH ANTIBODY: CPT

## 2025-04-19 PROCEDURE — 2580000003 HC RX 258: Performed by: INTERNAL MEDICINE

## 2025-04-19 RX ORDER — IPRATROPIUM BROMIDE AND ALBUTEROL SULFATE 2.5; .5 MG/3ML; MG/3ML
1 SOLUTION RESPIRATORY (INHALATION) EVERY 4 HOURS PRN
Status: DISCONTINUED | OUTPATIENT
Start: 2025-04-19 | End: 2025-04-20 | Stop reason: HOSPADM

## 2025-04-19 RX ORDER — MORPHINE SULFATE 2 MG/ML
1 INJECTION, SOLUTION INTRAMUSCULAR; INTRAVENOUS
Status: DISCONTINUED | OUTPATIENT
Start: 2025-04-19 | End: 2025-04-20 | Stop reason: HOSPADM

## 2025-04-19 RX ORDER — GLYCOPYRROLATE 0.2 MG/ML
0.2 INJECTION INTRAMUSCULAR; INTRAVENOUS EVERY 4 HOURS PRN
Status: DISCONTINUED | OUTPATIENT
Start: 2025-04-19 | End: 2025-04-20 | Stop reason: HOSPADM

## 2025-04-19 RX ORDER — FUROSEMIDE 10 MG/ML
40 INJECTION INTRAMUSCULAR; INTRAVENOUS ONCE
Status: COMPLETED | OUTPATIENT
Start: 2025-04-19 | End: 2025-04-19

## 2025-04-19 RX ADMIN — PIPERACILLIN AND TAZOBACTAM 3375 MG: 3; .375 INJECTION, POWDER, LYOPHILIZED, FOR SOLUTION INTRAVENOUS at 03:57

## 2025-04-19 RX ADMIN — PIPERACILLIN AND TAZOBACTAM 3375 MG: 3; .375 INJECTION, POWDER, LYOPHILIZED, FOR SOLUTION INTRAVENOUS at 11:20

## 2025-04-19 RX ADMIN — HYDROMORPHONE HYDROCHLORIDE 0.5 MG: 1 INJECTION, SOLUTION INTRAMUSCULAR; INTRAVENOUS; SUBCUTANEOUS at 16:15

## 2025-04-19 RX ADMIN — IPRATROPIUM BROMIDE AND ALBUTEROL SULFATE 1 DOSE: 2.5; .5 SOLUTION RESPIRATORY (INHALATION) at 08:46

## 2025-04-19 RX ADMIN — SERTRALINE 50 MG: 50 TABLET, FILM COATED ORAL at 09:36

## 2025-04-19 RX ADMIN — AZITHROMYCIN 500 MG: 250 TABLET, FILM COATED ORAL at 09:36

## 2025-04-19 RX ADMIN — FUROSEMIDE 40 MG: 10 INJECTION, SOLUTION INTRAMUSCULAR; INTRAVENOUS at 11:23

## 2025-04-19 RX ADMIN — FINASTERIDE 5 MG: 5 TABLET, FILM COATED ORAL at 09:36

## 2025-04-19 RX ADMIN — HYDROMORPHONE HYDROCHLORIDE 0.5 MG: 1 INJECTION, SOLUTION INTRAMUSCULAR; INTRAVENOUS; SUBCUTANEOUS at 23:58

## 2025-04-19 RX ADMIN — BUDESONIDE 500 MCG: 0.5 INHALANT RESPIRATORY (INHALATION) at 08:46

## 2025-04-19 RX ADMIN — WATER 60 MG: 1 INJECTION INTRAMUSCULAR; INTRAVENOUS; SUBCUTANEOUS at 16:16

## 2025-04-19 RX ADMIN — TAMSULOSIN HYDROCHLORIDE 0.4 MG: 0.4 CAPSULE ORAL at 09:36

## 2025-04-19 RX ADMIN — WATER 60 MG: 1 INJECTION INTRAMUSCULAR; INTRAVENOUS; SUBCUTANEOUS at 03:57

## 2025-04-19 RX ADMIN — CARBIDOPA AND LEVODOPA 1 TABLET: 25; 100 TABLET, EXTENDED RELEASE ORAL at 09:36

## 2025-04-19 RX ADMIN — POLYETHYLENE GLYCOL 3350 17 G: 17 POWDER, FOR SOLUTION ORAL at 09:37

## 2025-04-19 RX ADMIN — MEMANTINE 5 MG: 10 TABLET ORAL at 09:36

## 2025-04-19 RX ADMIN — IPRATROPIUM BROMIDE AND ALBUTEROL SULFATE 1 DOSE: 2.5; .5 SOLUTION RESPIRATORY (INHALATION) at 15:02

## 2025-04-19 RX ADMIN — WATER 60 MG: 1 INJECTION INTRAMUSCULAR; INTRAVENOUS; SUBCUTANEOUS at 11:21

## 2025-04-19 RX ADMIN — IPRATROPIUM BROMIDE AND ALBUTEROL SULFATE 1 DOSE: 2.5; .5 SOLUTION RESPIRATORY (INHALATION) at 11:09

## 2025-04-19 ASSESSMENT — PAIN SCALES - GENERAL
PAINLEVEL_OUTOF10: 0
PAINLEVEL_OUTOF10: 3
PAINLEVEL_OUTOF10: 0

## 2025-04-19 NOTE — PROGRESS NOTES
End of Shift Note    Bedside shift change report given to  Car DINERO(oncoming nurse) by Reginaldo Moseley RN (offgoing nurse).  Report included the following information SBAR, Kardex, ED Summary, Intake/Output, Recent Results, Cardiac Rhythm NSR, Alarm Parameters , Procedure Verification, and Quality Measures    Shift worked:  1900-0730     Shift summary and any significant changes:    Still on bipap, tolerated well,vitally stable so far,urine clearing now,no clots observed over 12hrs or any obstruction to conor of urine     Concerns for physician to address:       Zone phone for oncoming shift:          Activity:  Level of Assistance: Moderate assist, patient does 50-74%  Number times ambulated in hallways past shift: 0  Number of times OOB to chair past shift: 0    Cardiac:   Cardiac Monitoring: Yes      Cardiac Rhythm: Sinus tachy, Vent trigeminy    Access:  Current line(s): PIV     Genitourinary:   Urinary Status: Adkins, Draining    Respiratory:   O2 Device: PAP (positive airway pressure)  Chronic home O2 use?: NO  Incentive spirometer at bedside: YES    GI:  Last BM (including prior to admit): 04/17/25  Current diet:  ADULT ORAL NUTRITION SUPPLEMENT; Breakfast, Lunch, Dinner; Standard High Calorie/High Protein Oral Supplement  ADULT DIET; Full Liquid  Passing flatus: YES    Pain Management:   Patient states pain is manageable on current regimen: YES    Skin:  Krishna Scale Score: 18  Interventions: Wound Offloading (Prevention Methods): Turning, Repositioning    Patient Safety:  Fall Risk: Nursing Judgement-Fall Risk High(Add Comments): Yes  Fall Risk Interventions  Nursing Judgement-Fall Risk High(Add Comments): Yes  Toilet Every 2 Hours-In Advance of Need: Yes  Hourly Visual Checks: In bed, Awake  Fall Visual Posted: Armband  Room Door Open: Yes  Alarm On: Bed  Patient Moved Closer to Nursing Station: No    Active Consults:   IP CONSULT TO GI  IP CONSULT TO PULMONOLOGY  IP CONSULT TO CARDIOLOGY  IP

## 2025-04-19 NOTE — PROGRESS NOTES
Hospitalist Progress Note    NAME:   Jonatan Rausch Jr.   : 1945   MRN: [id number]     Date/Time: 2025 3:02 PM  Patient PCP: Kenny Barksdale MD    Estimated discharge date:  Barriers: Improvement in respiratory status    Subjective:     Chief Complaint / Reason for Physician Visit  Follow up AHRF, CHF vs PNA, PABLO, urine retention   Still on high flow 60liter   Hypoxic     Assessment / Plan:  Acute hypoxic respiratory failure worsening today  Acute on chronic HFrEF vs Pneumonia  - Initial CT showed multifocal pneumonia BL effusions, with low procalcitonin level and leukocytosis  - BNP of 13,000, echocardiogram showed EF of 23 percent  - Started on Lasix 40 mg twice daily on 4/10 , switch Rocephin to Zosyn  on , Lasix has been may held  due to rising creatinine  -Echo preliminary report of EF 15-20%  -Respiratory panel negative,   - metolazone 5 mg daily on , discontinue metolazone on  due to PABLO  - Repeat CT on   showed: Decreased bilateral pleural effusions with increased parenchymal groundglass throughout the lungs bilaterally right greater than left, suggestive of edema, less likely diffuse alveolar infection.  -Consulted cardiology on   -IV lasix held due to bump Cr  -Continue IV zosyn and zithromax  -Continue IV solumedrol q6  -Check MRSA screen - pending  -HFNC, NIV prn  -pulmonary following, appreciate help  : Patient remains on high flow oxygen 60liter at 100%, high risk of further decompensation  Lasix currently on hold due to rising creatinine  Palliative care consulted for goals of care discussion  PABLO  Urine retention  Hematuria due to catheter trauma  -overdiuresed vs ATN vs Cardiorenal  -CR 1.0-->1.4-->2.4-->2.8  -holding lasix  -straight cath x 2 --> verma inserted   -Nephrology input appreciated  -Urology consulted.  Manual irrigation after placing 24 [place] three-way Verma    Intermittent dysphagia  - Consulted with GI  -Barium swallow

## 2025-04-19 NOTE — PROGRESS NOTES
Pulmonary Progress Note    Patient: Jonatan Rausch Jr.                     YOB: 1945        Date- 4/19/2025                           Admit Date: 4/7/2025       CC: Follow up for hypoxia, pulmonary edema and pleural effusions.        IMPRESSION & PLAN:     Acute hypoxic respiratory failure  Pulmonary edema and CAP  Bilateral pleural effusions  Sleep apnea, on CPAP  PABLO  CHF    Plan    Acute hypoxic respiratory failure with no significant improvement, still requiring high FiO2. Alternating between heated high flow nasal cannula and noninvasive ventilator. Currently on 70 L high flow.   Continue close monitoring, may need ICU if respiratory status declines.  Improving pulmonary infiltrates, chest x-ray 4/19 . suspect inflammatory/infectious infiltrates in addition to cardiogenic edema  Elevated CRP , Respiratory viral panel, pneumonia antigens, blood cultures and procalcitonin negative.  Continue empirical antibiotics and steroids.  Prognosis is poor with prolonged acute respiratory failure requiring noninvasive ventilator with suspected sepsis and PABLO in the setting of severe CHF and advanced age.  Recommend involving palliative care.    Critically ill.     Critical care time - 40 minutes          Subjective:    4/19 - on high flow 70%    Medications:  Current Facility-Administered Medications   Medication Dose Route Frequency Provider Last Rate Last Admin    piperacillin-tazobactam (ZOSYN) 3,375 mg in sodium chloride 0.9 % 50 mL IVPB (addEASE)  3,375 mg IntraVENous Q8H Rodrigue Nichols MD 12.5 mL/hr at 04/19/25 1120 3,375 mg at 04/19/25 1120    iron sucrose (VENOFER) injection 200 mg  200 mg IntraVENous Daily Rodrigue Nichols MD   200 mg at 04/18/25 1836    [Held by provider] enoxaparin Sodium (LOVENOX) injection 30 mg  30 mg SubCUTAneous Daily Hannah Aleman APRN - NP   30 mg at 04/17/25 0947    methylPREDNISolone sodium succ (SOLU-MEDROL) 60 mg in sterile water 0.96 mL injection  60 mg

## 2025-04-19 NOTE — PROGRESS NOTES
[facility] Hospitalist cross coverage 7p-7a:    Nursing contacted Nocturnist/cross cover provider via [employer] and notified that patient has been declining and family would like DNR and possibly comfort care.     Evaluated patient at bedside, and discussed options with spouse and son. They are requesting DNR and comfort measures only, understanding this means no escalation of care, shock, intubation, CPR, painful procedures such as lab draws, imaging, blood glucose checks, or antibiotics and that the focus is on a peaceful, comfortable, natural passing. Informed them to please let us know if he appears uncomfortable at all as they know him and his needs well. Orders placed to reflect family wishes. Provide supportive measures such as oxygen if needed to promote patient comfort. Hold PO meds if patient cannot tolerate.    Nursing to notify Hospitalist for further/continued concerns, will remain available overnight for further patient needs.       Caryl Singh [facility]

## 2025-04-19 NOTE — PROGRESS NOTES
[facility]      Patient: Jonatan Rausch Jr. MRN: [id number]  SSN: [id number]    YOB: 1945  Age: 80 y.o.  Sex: male        ADMITTED: 4/7/2025 to Arelis Marroquin MD by Kg Domínguez Jr., MD for Sepsis (HCC) [A41.9]  Community acquired pneumonia, unspecified laterality [J18.9]    Assessment:   Gross hematuria suspect multifactorial catheter trauma/ BPH/ ASA and Lovenox  Acute Urinary retention  BPH with LUTS- followed by Dr. Auguste   - prostate vol measures 87cc.    PABLO:nephrology following  CHF  Anemia    Leukocytosis  - likely related to solumedrol      Recommendations:   Continue to hold anticoagulation/ ASA, if urine remains clear can consider resuming AC in am   Continue with manual irrigation q 4hrs PRN clots    Will see as needed going forward    Interval History     Jonatan Rausch Jr. is seen and examined. FC patent, draining clear gucci urine.         Current Medications: all current  Medications have been eviewed in EPIC  Review of Systems: Pertinent items are noted in HPI.    Objective:  Vitals:    Vitals:    04/18/25 2254 04/18/25 2310 04/19/25 0320 04/19/25 0406   BP:  (!) 141/74 126/83    Pulse: (!) 127 (!) 125 (!) 108 (!) 110   Resp: 28 (!) 31 27 27   Temp:  98.3 °F (36.8 °C) 98.4 °F (36.9 °C)    TempSrc:  Oral Axillary    SpO2: 92%   94%   Weight:       Height:         Intake and Output:  No intake/output data recorded.  04/17 1901 - 04/19 0700  In: 900 [I.V.:20]  Out: 3430 [Urine:3430]    LABS:  Recent Labs     04/19/25  0115 04/18/25  0952 04/17/25  0503 04/07/25  1517 04/07/25  1300    143 138   < > 130*   K 3.8 3.4* 4.3   < > 4.3    106 105   < > 99   CO2 31 32 26   < > 23   BUN 87* 78* 78*   < > 24*   CREATININE 1.89* 1.78* 2.31*   < > 1.17   CALCIUM 9.4 9.3 9.1   < > 9.2   MG  --   --  2.3  --  1.9    < > = values in this interval not displayed.     Recent Labs     04/19/25  0115 04/18/25  0204 04/17/25  1417   WBC 22.6*

## 2025-04-19 NOTE — PROGRESS NOTES
End of Shift Note    Bedside shift change report given to bem (oncoming nurse) by Car Keene RN (offgoing nurse).  Report included the following information SBAR and Kardex    Shift worked:  7-7     Shift summary and any significant changes:     DNR needed comfort care in am     Concerns for physician to address:  Need DNR order     Zone phone for oncqasim shift:   [phone]       Activity:  Level of Assistance: Moderate assist, patient does 50-74%  Number times ambulated in hallways past shift: 0  Number of times OOB to chair past shift: 0    Cardiac:   Cardiac Monitoring: Yes      Cardiac Rhythm: Sinus tachy, Pair PVCs    Access:  Current line(s): PIV     Genitourinary:   Urinary Status: Adkins    Respiratory:   O2 Device: Heated high flow cannula  Chronic home O2 use?: YES  Incentive spirometer at bedside: YES    GI:  Last BM (including prior to admit): 04/17/25  Current diet:  ADULT ORAL NUTRITION SUPPLEMENT; Breakfast, Lunch, Dinner; Standard High Calorie/High Protein Oral Supplement  ADULT DIET; Full Liquid  Passing flatus: YES    Pain Management:   Patient states pain is manageable on current regimen: YES    Skin:  Krishna Scale Score: 18  Interventions: Wound Offloading (Prevention Methods): Turning, Repositioning    Patient Safety:  Fall Risk: Nursing Judgement-Fall Risk High(Add Comments): Yes  Fall Risk Interventions  Nursing Judgement-Fall Risk High(Add Comments): Yes  Toilet Every 2 Hours-In Advance of Need: Yes  Hourly Visual Checks: Awake  Fall Visual Posted: Other (comment)  Room Door Open: Yes  Alarm On: Bed  Patient Moved Closer to Nursing Station: No    Active Consults:   IP CONSULT TO GI  IP CONSULT TO PULMONOLOGY  IP CONSULT TO CARDIOLOGY  IP CONSULT TO CASE MANAGEMENT  IP CONSULT TO NEPHROLOGY  IP CONSULT TO PALLIATIVE CARE    Length of Stay:  Expected LOS: 16  Actual LOS: 12    Car Keene, RN

## 2025-04-19 NOTE — PROGRESS NOTES
Nephrology Progress Note  [facility] [place] [facility]  [address], [address]  [place] [address]  Phone - [phone]  Fax - [phone]                   Patient: Jonatan Rausch Jr.                     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 Verma cath with output. Dw RT pt on HFNC yesterday, although required transition back to BiPAP due to increased work. BUN increasing due to steroid use. Reviewed palliative care consult note.     He has hematuria  Good urine output    Objective:   Vitals:    04/18/25 2254 04/18/25 2310 04/19/25 0320 04/19/25 0406   BP:  (!) 141/74 126/83    Pulse: (!) 127 (!) 125 (!) 108 (!) 110   Resp: 28 (!) 31 27 27   Temp:  98.3 °F (36.8 °C) 98.4 °F (36.9 °C)    TempSrc:  Oral Axillary    SpO2: 92%   94%   Weight:       Height:          I/O last 3 completed shifts:  In: 900 [I.V.:20; Other:880]  Out: 3430 [Urine:3430]  No intake/output data recorded.      Physical exam:    GEN: nad  NECK- no mass  RESP: no wheezing, decreased BS b/l  CVS: S1,S2  RRR  NEURO: Normal speech, Non focal  EXT: + Edema   PSYCH: Normal Mood    Chart reviewed.         Pertinent Notes reviewed.       Data Review :  Lab Results   Component Value Date/Time     04/19/2025 01:15 AM    K 3.8 04/19/2025 01:15 AM

## 2025-04-20 VITALS
TEMPERATURE: 100.5 F | WEIGHT: 157.85 LBS | HEIGHT: 66 IN | BODY MASS INDEX: 25.37 KG/M2 | OXYGEN SATURATION: 72 % | RESPIRATION RATE: 34 BRPM | DIASTOLIC BLOOD PRESSURE: 59 MMHG | HEART RATE: 102 BPM | SYSTOLIC BLOOD PRESSURE: 115 MMHG

## 2025-04-20 PROCEDURE — 2500000003 HC RX 250 WO HCPCS: Performed by: INTERNAL MEDICINE

## 2025-04-20 PROCEDURE — 6360000002 HC RX W HCPCS

## 2025-04-20 PROCEDURE — 6360000002 HC RX W HCPCS: Performed by: INTERNAL MEDICINE

## 2025-04-20 PROCEDURE — 2700000000 HC OXYGEN THERAPY PER DAY

## 2025-04-20 PROCEDURE — 6370000000 HC RX 637 (ALT 250 FOR IP): Performed by: INTERNAL MEDICINE

## 2025-04-20 RX ORDER — LORAZEPAM 2 MG/ML
2 INJECTION INTRAMUSCULAR EVERY 4 HOURS PRN
Status: DISCONTINUED | OUTPATIENT
Start: 2025-04-20 | End: 2025-04-20 | Stop reason: HOSPADM

## 2025-04-20 RX ORDER — DIPHENHYDRAMINE HYDROCHLORIDE 50 MG/ML
12.5 INJECTION, SOLUTION INTRAMUSCULAR; INTRAVENOUS ONCE
Status: COMPLETED | OUTPATIENT
Start: 2025-04-20 | End: 2025-04-20

## 2025-04-20 RX ORDER — LORAZEPAM 2 MG/ML
2 INJECTION INTRAMUSCULAR EVERY 4 HOURS
Status: DISCONTINUED | OUTPATIENT
Start: 2025-04-20 | End: 2025-04-20

## 2025-04-20 RX ADMIN — WATER 60 MG: 1 INJECTION INTRAMUSCULAR; INTRAVENOUS; SUBCUTANEOUS at 11:49

## 2025-04-20 RX ADMIN — SODIUM CHLORIDE, PRESERVATIVE FREE 10 ML: 5 INJECTION INTRAVENOUS at 11:49

## 2025-04-20 RX ADMIN — DIPHENHYDRAMINE HYDROCHLORIDE 12.5 MG: 50 INJECTION INTRAMUSCULAR; INTRAVENOUS at 05:08

## 2025-04-20 RX ADMIN — MORPHINE SULFATE 1 MG: 2 INJECTION, SOLUTION INTRAMUSCULAR; INTRAVENOUS at 04:53

## 2025-04-20 RX ADMIN — LORAZEPAM 2 MG: 2 INJECTION INTRAMUSCULAR; INTRAVENOUS at 11:53

## 2025-04-20 RX ADMIN — HYDROMORPHONE HYDROCHLORIDE 0.5 MG: 1 INJECTION, SOLUTION INTRAMUSCULAR; INTRAVENOUS; SUBCUTANEOUS at 06:58

## 2025-04-20 RX ADMIN — SODIUM CHLORIDE, PRESERVATIVE FREE 10 ML: 5 INJECTION INTRAVENOUS at 11:08

## 2025-04-20 RX ADMIN — ACETAMINOPHEN 650 MG: 650 SUPPOSITORY RECTAL at 10:58

## 2025-04-20 NOTE — DEATH NOTES
Death Pronouncement Note  Patient's Name: Jonatan Rausch Jr.   Patient's YOB: 1945  MRN Number: [id number]    Admitting Provider: Kg Domínguez Jr., MD  Attending Provider: Arelis Marroquin MD    Patient was examined and the following were absent: {DEATHNOTEABSENT:80546::\"Pulses\",\"Blood Pressure\",\"Respiratory effort\"}    I declared the patient dead on  at     Preliminary Cause of Death:   *** DO NOT ELECTRONICALLY SIGN NOTE YET!!!  YOU MUST ENTER DATE & TIME OF DEATH IN DATE/TIME/CAUSE SECTION OF DISCHARGE AS  NAVIGATOR THEN REFRESH DEATH NOTE BEFORE ELECTRONICALLY SIGNING DEATH NOTE!! ***  Electronically signed by Arelis Little MD on 25 at 2:30 PM EDT

## 2025-04-20 NOTE — PROGRESS NOTES
Pulmonary Progress Note    Patient: Jonatan Rausch Jr.                     YOB: 1945        Date- 4/20/2025                           Admit Date: 4/7/2025       CC: Follow up for hypoxia, pulmonary edema and pleural effusions.        IMPRESSION & PLAN:     Acute hypoxic respiratory failure  Pulmonary edema and CAP  Bilateral pleural effusions  Sleep apnea, on CPAP  PABLO  CHF    Plan    Patient was transitioned to comfort measures yesterday. He is currently on 20 L for comfort. Family at bedside.     Cont. Comfort care. Plan of care discussed with family and RN.    Pulmonary team will sign off.           Subjective:    4/19 - on high flow 70%  4/20 - lying comfortably, on 20 L supplemental oxygen.    Medications:  Current Facility-Administered Medications   Medication Dose Route Frequency Provider Last Rate Last Admin    LORazepam (ATIVAN) injection 2 mg  2 mg IntraVENous Q4H PRN Arelis Marroquin MD   2 mg at 04/20/25 1153    morphine (PF) injection 1 mg  1 mg IntraVENous Q3H PRN Arelis Marroquin MD   1 mg at 04/20/25 0453    HYDROmorphone (DILAUDID) injection 0.5 mg  0.5 mg IntraVENous Q2H PRN Marlene Mir MD   0.5 mg at 04/20/25 0658    glycopyrrolate (ROBINUL) injection 0.2 mg  0.2 mg IntraVENous Q4H PRN Caryl Singh APRN - NP        ipratropium 0.5 mg-albuterol 2.5 mg (DUONEB) nebulizer solution 1 Dose  1 Dose Inhalation Q4H PRN Caryl Singh APRN - NP        iron sucrose (VENOFER) injection 200 mg  200 mg IntraVENous Daily Rodrigue Nichols MD   200 mg at 04/18/25 1836    methylPREDNISolone sodium succ (SOLU-MEDROL) 60 mg in sterile water 0.96 mL injection  60 mg IntraVENous Q6H Ofelia Marr MD   60 mg at 04/20/25 1149    pantoprazole (PROTONIX) tablet 40 mg  40 mg Oral QAM AC Kris Nino MD   40 mg at 04/18/25 0602    QUEtiapine (SEROQUEL) tablet 25 mg  25 mg Oral QPM Kris Nino MD   25 mg at 04/18/25 1831    sodium chloride flush 0.9 % injection 5-40 mL

## 2025-04-20 NOTE — PLAN OF CARE
Problem: Discharge Planning  Goal: Discharge to home or other facility with appropriate resources  Outcome: Progressing     Problem: Skin/Tissue Integrity  Goal: Skin integrity remains intact  Description: 1.  Monitor for areas of redness and/or skin breakdown  2.  Assess vascular access sites hourly  3.  Every 4-6 hours minimum:  Change oxygen saturation probe site  4.  Every 4-6 hours:  If on nasal continuous positive airway pressure, respiratory therapy assess nares and determine need for appliance change or resting period  Outcome: Progressing     Problem: Safety - Adult  Goal: Free from fall injury  Outcome: Progressing     Problem: Neurosensory - Adult  Goal: Achieves stable or improved neurological status  Outcome: Progressing     Problem: Respiratory - Adult  Goal: Achieves optimal ventilation and oxygenation  4/20/2025 0937 by Allen Rose RN  Outcome: Progressing  4/20/2025 0806 by Sadaf Everett RCP  Outcome: Not Progressing

## 2025-04-20 NOTE — PROGRESS NOTES
Pt taken off Bipap and placed on HHFNC for comfort measures, per family wanted to trial high flow if patient tolerates to help w/ the transition off of bipap and for WOB. At 2111, Pt placed on 30L 80% tolerating at this time and appears to be more comfortable on high flow.

## 2025-04-20 NOTE — PROGRESS NOTES
1900; Bedside shift change report given to Yan RN (oncoming nurse) by Car DINERO (offgoing nurse). Report included the following information Nurse Handoff Report, Intake/Output, MAR, Recent Results, and Cardiac Rhythm NSR . Dayshift RN spoke to family about goals of care while bedside shift report, family requested DNR and comfort measures. Charge RN notified Caryl NP.     1934: Caryl NP at bedside. Goals of care addressed, patient is now DNR and comfort measures. Family at bedside, questions answered.     2111: RT transitioned patient to HHF from BIPAP. Patient appears to be more comfortable.     End of Shift Note    Bedside shift change report given to Ellie DINERO (oncoming nurse) by Yan Chen RN (offgoing nurse).  Report included the following information SBAR, Kardex, ED Summary, Intake/Output, MAR, Recent Results, and Cardiac Rhythm ST/Sflutter    Shift worked:  4925-1190     Shift summary and any significant changes:     Comfort measures with dilaudid and morphine     Concerns for physician to address:  Palliative consult     Zone phone for oncoming shift:          Activity:  Level of Assistance: Maximum assist, patient does 25-49%  Number times ambulated in hallways past shift: 0  Number of times OOB to chair past shift: 0    Cardiac:   Cardiac Monitoring: No      Cardiac Rhythm: Sinus tachy    Access:  Current line(s): PIV     Genitourinary:   Urinary Status: Adkins    Respiratory:   O2 Device: Heated high flow cannula  Chronic home O2 use?: YES  Incentive spirometer at bedside: YES    GI:  Last BM (including prior to admit): 04/19/25  Current diet:  ADULT ORAL NUTRITION SUPPLEMENT; Breakfast, Lunch, Dinner; Standard High Calorie/High Protein Oral Supplement  ADULT DIET; Full Liquid  Passing flatus: YES    Pain Management:   Patient states pain is manageable on current regimen: YES    Skin:  Krishna Scale Score: 14  Interventions: Wound Offloading (Prevention Methods): Elevate heels, Pillows,

## 2025-04-20 NOTE — PROGRESS NOTES
Spiritual Health History and Assessment/Progress Note  [facility]    Grief, Loss, and Adjustments,  , Death,      Name: Jonatan Rausch Jr. MRN: [id number]    Age: 80 y.o.     Sex: male   Language: English   Gnosticist: Buddhist   Sepsis (HCC)     Date: 2025            Total Time Calculated: 18 min              Spiritual Assessment began in [facility] PROGRESSIVE CARE        Referral/Consult From: Nurse   Encounter Overview/Reason: Grief, Loss, and Adjustments  Service Provided For: Family    [place], Belief, Meaning:   Patient unable to assess at this time  Family/Friends identify as spiritual, are connected with a keeley tradition or spiritual practice, and have beliefs or practices that help with coping during difficult times      Importance and Influence:  Patient unable to assess at this time  Family/Friends have spiritual/personal beliefs that influence decisions regarding the patient's health    Community:  Patient Other: unable to assess  Family/Friends are connected with a spiritual community: and feel well-supported. Support system includes: [place] Community and Extended family    Assessment and Plan of Care:     Patient Interventions include: Other: patient is    Family/Friends Interventions include: Facilitated expression of thoughts and feelings, Explored spiritual coping/struggle/distress, Affirmed coping skills/support systems, and Other: shared prayer    Patient Plan of Care:   Family/Friends Plan of Care:     Electronically signed by Chaplain CHRISTINE MAHMOOD on 2025 at 2:28 PM

## 2025-04-21 LAB
C-ANCA TITR SER IF: NORMAL TITER
EKG ATRIAL RATE: 108 BPM
EKG DIAGNOSIS: NORMAL
EKG P AXIS: 39 DEGREES
EKG P-R INTERVAL: 156 MS
EKG Q-T INTERVAL: 340 MS
EKG QRS DURATION: 112 MS
EKG QTC CALCULATION (BAZETT): 455 MS
EKG R AXIS: -6 DEGREES
EKG T AXIS: 154 DEGREES
EKG VENTRICULAR RATE: 108 BPM
MYELOPEROXIDASE AB SER IA-ACNC: <0.2 UNITS (ref 0–0.9)
P-ANCA ATYPICAL TITR SER IF: NORMAL TITER
P-ANCA TITR SER IF: NORMAL TITER
PROTEINASE3 AB SER IA-ACNC: <0.2 UNITS (ref 0–0.9)

## 2025-04-22 LAB
C-ANCA TITR SER IF: NORMAL TITER
MYELOPEROXIDASE AB SER IA-ACNC: <0.2 UNITS (ref 0–0.9)
P-ANCA ATYPICAL TITR SER IF: NORMAL TITER
P-ANCA TITR SER IF: NORMAL TITER
PROTEINASE3 AB SER IA-ACNC: <0.2 UNITS (ref 0–0.9)

## 2025-04-23 LAB
ALBUMIN SERPL ELPH-MCNC: 3.5 G/DL (ref 2.9–4.4)
ALBUMIN/GLOB SERPL: 1.2 (ref 0.7–1.7)
ALPHA1 GLOB SERPL ELPH-MCNC: 0.5 G/DL (ref 0–0.4)
ALPHA2 GLOB SERPL ELPH-MCNC: 0.9 G/DL (ref 0.4–1)
B-GLOBULIN SERPL ELPH-MCNC: 0.8 G/DL (ref 0.7–1.3)
GAMMA GLOB SERPL ELPH-MCNC: 0.8 G/DL (ref 0.4–1.8)
GLOBULIN SER CALC-MCNC: 3 G/DL (ref 2.2–3.9)
IGA SERPL-MCNC: 255 MG/DL (ref 61–437)
IGG SERPL-MCNC: 844 MG/DL (ref 603–1613)
IGM SERPL-MCNC: 167 MG/DL (ref 15–143)
INTERPRETATION SERPL IEP-IMP: ABNORMAL
LABORATORY COMMENT REPORT: ABNORMAL
M PROTEIN SERPL ELPH-MCNC: 0.2 G/DL
PROT SERPL-MCNC: 6.5 G/DL (ref 6–8.5)
REFLEX TESTING: ABNORMAL

## 2025-04-30 NOTE — DISCHARGE SUMMARY
infection.  -Consulted cardiology on 4/14  -IV lasix held due to bump Cr  -IV zosyn and zithromax  - IV solumedrol q6  -MRSA screen -   -HFNC, NIV prn  -pulmonary following, appreciate help  04/18: Patient remains on high flow oxygen 60liter at 100%, high risk of further decompensation  Lasix currently on hold due to rising creatinine  Palliative care consulted for goals of care discussion    Pt taken off Bipap and placed on HHFNC for comfort measures, per family wanted to trial high flow if patient tolerates to help w/ the transition off of bipap and for WOB. At 2111, Pt placed on 30L 80% tolerating at this time and appears to be more comfortable on high flow after family decision.  Patient made comfort care measures on    Urine retention  Hematuria due to catheter trauma  -overdiuresed vs ATN vs Cardiorenal  -CR 1.0-->1.4-->2.4-->2.8  -holding lasix  -straight cath x 2 --> verma inserted 4/17  -Nephrology input appreciated  -Urology consulted.  Manual irrigation after placing 24 Armenian three-way Verma     Intermittent dysphagia  - Consulted with GI  -Barium swallow 4/30/24  -There is no evidence of esophageal stricture or significant esophageal dysmotility.  - Given patient suffering from pneumonia and dysphagia is intermittent without pain, recommend outpatient follow-up  -- start with SLP eval      Iron deficiency anemia  -iron 12, TIBC 294, 4%iron sat  -IV iron sucrose 200mg daily x 3  -GI eval pending medical stabillity     History of Parkinson's disease  Dementia  --Wife reports progression of disease  -- Continue Sinemet  -- Continue Aricept and Namenda  PT OT evaluation     Essential hypertension  Hyperlipidemia  History of coronary artery disease status post CABG    Patient's overall condition as far as respiratory status worsened. ICU consult requested . As per extensive conversation with intensivist and patient's family decision eas made to make patient comfort care  Discussed case with: updated pt wife